# Patient Record
Sex: MALE | Race: BLACK OR AFRICAN AMERICAN | NOT HISPANIC OR LATINO | Employment: OTHER | URBAN - METROPOLITAN AREA
[De-identification: names, ages, dates, MRNs, and addresses within clinical notes are randomized per-mention and may not be internally consistent; named-entity substitution may affect disease eponyms.]

---

## 2017-08-04 ENCOUNTER — CARE COORDINATION (OUTPATIENT)
Dept: SURGERY | Facility: CLINIC | Age: 62
End: 2017-08-04

## 2017-08-04 NOTE — PROGRESS NOTES
Rec'd a msg from [facility] that pt's friend, Chanelle, had questions about our dept as pt has rectal cancer, has had surgery.  Called her see if I can answer any questions but had to lv  msg.

## 2017-08-04 NOTE — PROGRESS NOTES
Chanelle called back; states she is pt's former wife.  She gave me pt's phone #, called him but had to lv  msg with my direct phone #.  Let him know Chanelle gave me his number and if he would like more information about a 2nd opinion, he can call me back.

## 2019-05-15 ENCOUNTER — DOCUMENTATION ONLY (OUTPATIENT)
Dept: CARE COORDINATION | Facility: CLINIC | Age: 64
End: 2019-05-15

## 2019-05-23 ENCOUNTER — OFFICE VISIT (OUTPATIENT)
Dept: INTERNAL MEDICINE | Facility: CLINIC | Age: 64
End: 2019-05-23
Payer: COMMERCIAL

## 2019-05-23 ENCOUNTER — PRE VISIT (OUTPATIENT)
Dept: UROLOGY | Facility: CLINIC | Age: 64
End: 2019-05-23

## 2019-05-23 VITALS
OXYGEN SATURATION: 97 % | DIASTOLIC BLOOD PRESSURE: 79 MMHG | SYSTOLIC BLOOD PRESSURE: 153 MMHG | HEART RATE: 61 BPM | RESPIRATION RATE: 16 BRPM

## 2019-05-23 DIAGNOSIS — N52.9 ERECTILE DYSFUNCTION, UNSPECIFIED ERECTILE DYSFUNCTION TYPE: ICD-10-CM

## 2019-05-23 DIAGNOSIS — M53.3 SACROILIAC JOINT PAIN: ICD-10-CM

## 2019-05-23 DIAGNOSIS — Z13.220 SCREENING FOR HYPERLIPIDEMIA: ICD-10-CM

## 2019-05-23 DIAGNOSIS — M53.3 SACROILIAC JOINT PAIN: Primary | ICD-10-CM

## 2019-05-23 DIAGNOSIS — C19 COLORECTAL CANCER (H): ICD-10-CM

## 2019-05-23 LAB
ANION GAP SERPL CALCULATED.3IONS-SCNC: 7 MMOL/L (ref 3–14)
BUN SERPL-MCNC: 13 MG/DL (ref 7–30)
CALCIUM SERPL-MCNC: 9.5 MG/DL (ref 8.5–10.1)
CHLORIDE SERPL-SCNC: 104 MMOL/L (ref 94–109)
CHOLEST SERPL-MCNC: 256 MG/DL
CO2 SERPL-SCNC: 28 MMOL/L (ref 20–32)
CREAT SERPL-MCNC: 0.89 MG/DL (ref 0.66–1.25)
GFR SERPL CREATININE-BSD FRML MDRD: 89 ML/MIN/{1.73_M2}
GLUCOSE SERPL-MCNC: 94 MG/DL (ref 70–99)
HDLC SERPL-MCNC: 93 MG/DL
LDLC SERPL CALC-MCNC: 140 MG/DL
NONHDLC SERPL-MCNC: 163 MG/DL
POTASSIUM SERPL-SCNC: 4 MMOL/L (ref 3.4–5.3)
SODIUM SERPL-SCNC: 138 MMOL/L (ref 133–144)
TRIGL SERPL-MCNC: 117 MG/DL

## 2019-05-23 RX ORDER — OMEPRAZOLE 20 MG/1
20 TABLET, DELAYED RELEASE ORAL DAILY
Qty: 30 TABLET | Refills: 3 | Status: SHIPPED | OUTPATIENT
Start: 2019-05-23 | End: 2021-04-07

## 2019-05-23 RX ORDER — MELOXICAM 15 MG/1
15 TABLET ORAL DAILY
Qty: 30 TABLET | Refills: 3 | Status: SHIPPED | OUTPATIENT
Start: 2019-05-23 | End: 2019-05-23

## 2019-05-23 RX ORDER — IBUPROFEN 200 MG
TABLET ORAL
COMMUNITY
Start: 2017-08-22 | End: 2021-07-20

## 2019-05-23 RX ORDER — MELOXICAM 15 MG/1
15 TABLET ORAL DAILY
Qty: 30 TABLET | Refills: 3 | Status: SHIPPED | OUTPATIENT
Start: 2019-05-23 | End: 2021-04-07

## 2019-05-23 SDOH — HEALTH STABILITY: MENTAL HEALTH: HOW OFTEN DO YOU HAVE A DRINK CONTAINING ALCOHOL?: 2-4 TIMES A MONTH

## 2019-05-23 ASSESSMENT — PAIN SCALES - GENERAL: PAINLEVEL: MODERATE PAIN (5)

## 2019-05-23 NOTE — TELEPHONE ENCOUNTER
MEDICAL RECORDS REQUEST   [facility]  Urology Clinic  [address] [place] [address]  PHONE: [phone]  Fax: [phone]        FUTURE VISIT INFORMATION                                                   Paul Roland YOB: 1955 scheduled for future visit at [facility]    APPOINTMENT INFORMATION:    Date: 19 7:20AM     Provider:  Eliceo Damon MD     Reason for Visit/Diagnosis: ED    REFERRAL INFORMATION:    Referring provider: TOM BALL     Specialty: MD    Referring providers clinic:  [facility]     Clinic contact number: [phone]    RECORDS REQUESTED FOR VISIT                                                     NOTES  STATUS/DETAILS   OFFICE NOTE from referring provider  yes   OFFICE NOTE from other specialist  yes   DISCHARGE SUMMARY from hospital  no   DISCHARGE REPORT from the ER  no   OPERATIVE REPORT  no   MEDICATION LIST  no     PRE-VISIT CHECKLIST      Record collection complete Yes   Appointment appropriately scheduled           (right time/right provider) Yes   MyChart activation If no, please explain: In process   Questionnaire complete If no, please explain: In process     Completed by: Zhanna Lara

## 2019-05-23 NOTE — TELEPHONE ENCOUNTER
Reason for Visit: Consult    Diagnosis: ED    Orders/Procedures/Records: n/a    Contact Patient: n/a    Rooming Requirements: normal      Smiley Eaton LPN  05/23/19  2:20 PM

## 2019-05-23 NOTE — PROGRESS NOTES
PRIMARY CARE CENTER       Assessment and Plan   Paul Roland is a 64 year old male with past medical history of adenomatous polyp of colon, urinary retention, BPH, rectal cancer diagnosed on 8/10/2017 stage I, and tobacco abuse, all previously followed at [facility] -now presents to establish care.     Sacroiliac joint pain  Likely from sitting for prolonged periods of time at work (works as )   -     PHYSICAL THERAPY REFERRAL; Future  -     meloxicam (MOBIC) 15 MG tablet; Take 1 tablet (15 mg) by mouth daily  -     Basic metabolic panel; Future  -     omeprazole (PRILOSEC OTC) 20 MG EC tablet; Take 1 tablet (20 mg) by mouth daily    Erectile dysfunction, unspecified erectile dysfunction type  Previously receiving Intracavernosal injections. Has 10 injections left.   -     UROLOGY ADULT REFERRAL    Colorectal cancer (H)  Followed previously at [facility]. Would like to establish care here for follow up.   Colonoscopy due in 10/2019.   Has some issues straining however no thin stools or rectal bleeding. BM BID - generally formed.   -     COLORECTAL SURGERY REFERRAL   - Miralax to ensure he minimizes straining.     Health Maintenance    Blood pressure check - Mildly elevated. May ve is NSAID use however will  monitor.     Weight - patient is 0 lbs 0 oz, There is no height or weight on file to calculate BMI.   Depression/Anxiety -  Low PHQ2     Alcohol Use -  Occasional    Tobacco Use - Counseled on possible cessation however patient precontemplative.     Screening     Colon Cancer - Annual due in 10/2019    Lung Cancer - Due. Will address at next v isit.     Immunizations  - Up to date     Men's Health     Cholesterol -  Ordered lipid pane     AAA - Due currently. Will discuss at next visit.      Options for treatment and follow-up care were reviewed with the patient. Paul Roland engaged in the decision making process and verbalized understanding of the options discussed and  agreed with the final plan.    Patient should follow up in 1-2 months with provider in resident clinic   At this appointment he will need:   - Discuss Back Pain   - DIscuss HTN and recorded home BP   - Colonoscopy referral for 10/2019   - May choose to discuss Lung cancer screening and discuss AAA screening.     Violeta Meade MD  May 23, 2019    Pt was seen and examined by me; I agree with the A/P documentation above    Lena Turner MD        HPI:       Paul Roland is a 64 year old male with past medical history of adenomatous polyp of colon, urinary retention, BPH, rectal cancer diagnosed on 8/10/2017 stage I, and tobacco abuse, all previously followed at [facility] -now presents to establish care.    As of 2018, [facility] his erectile dysfunction as well as BPH.  At the time, he was receiving intracavernosal injections. Previously he received suboptimal response with respect to duration of erections with pharmacoptherapy.   He was also last seen in colorectal surgery clinic on 11/30/2017.  At the time, he had undergone proctectomy wth tiverting loop ileostomy for T2 N0 rectal cancer (August 16, 2017. His issues with erectile dysfunction and urinary retention began post procedure.     He does not have ap [facility] physician and hopes to transfer his care to [facility] for all his sub specialists as well.     Today he wishes to discuss:  1) Severe lower back   States it has been occurring for over a month and points to his low back area.  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  He works nights and drives from town p.m. to 6 AM in the morning.  Generally, when he returns home in the morning, he sleeps on  A heating pad which seems to help. Also takes 10 200 mg Ibuprofen per shift which seems to help; has not hag melena hematochezia or hematemesis or stomach pain. Usually states the pain is  bilateral and wraps around his buttock region from his low back. He is able to walk and drive without issue     2) Colorectal cancer   Diagnosed at [facility], underwent proctectomy and diverting loop ileostomy there (and had sequelae of ED as well as urinary retention thereafter for which  followed with urology). Undergoes colonoscopies every October. Last completed 10/24/2018.   Today, he states he does have to strain to use the restroom. He is regular and has 1-2 BMs per day but feels his stools are hard.      Past Medical History:   Diagnosis Date     Rectal cancer (H) 08/10/2017    Clinical: Stage I (T2, N0, M0)        Past Surgical History:   Procedure Laterality Date     RECTAL SURGERY  08/16/2017    with diverting loop ileostomy      Family History   Problem Relation Age of Onset     Liver Cancer Mother      Social History     Tobacco Use     Smoking status: Current Every Day Smoker     Smokeless tobacco: Never Used   Substance Use Topics     Alcohol use: Yes     Frequency: 2-4 times a month      Medications are reviewed in chart.        Review of Systems:   Review Of Systems - 10 point ROS negative apart from above.           Physical Exam:   /79   Pulse 61   Resp 16   SpO2 97%   There is no height or weight on file to calculate BMI.  Gen: In no acute distress. Patient comfortably sitting on exam table in clinic.   HEENT: No scleral icterus, Moist mucous membranes. No nasal discharge.  CV: Normal rate, regular rhythm. S1/S2 normal.  No murmurs, rubs or gallops   Resp: Clear to auscultation bilaterally. Without wheeze or crackles  Abdomen: Soft, non tender abdomen, normal active bowel sounds,   Extremities: No peripheral edema, warm, capillary refill < 2 seconds, Negative straight leg raise. Positive figure four.   Skin: without rash or trauma   Neuro: Oriented to city, date and year.       Results:   Reviewed in chart

## 2019-05-23 NOTE — NURSING NOTE
Chief Complaint   Patient presents with     Back Pain     pt is here to discuss low back pain x 1 month        Vital signs:      BP: 153/79 Pulse: 61   Resp: 16 SpO2: 97 %          There is no height or weight on file to calculate BMI.      Smiley Guzman CMA at 7:09 AM on 5/23/2019

## 2019-05-23 NOTE — PATIENT INSTRUCTIONS
1) For your back pain   I believe this is most likley SI joint pain. I recommend you obtain an SI joint pillow which will allow for you to optimally position your SI joint. I also recommend you take Melxocam ONCE A DAY at around 8pm prior to your shift. I would also take the omeprazole to ensure your stomach lining is protected once a day.   Physical therapy will help as well.     Attached below is some information on inflammation of the SI joint.     Patient Education     Sacroiliitis  The sacrum is the triangle-shaped bone at the base of the spine. It is linked to the other pelvis bones by the sacroiliac joints, also called SI joints. Sacroiliitis is when one or both SI joints are hurt or inflamed. It can make small movements of the lower back and pelvis very painful.  This condition has been linked to other diseases.Pain occurs most often in the morning or after sitting for long periods of time. The pain may get worse when you walk. The swinging motion of the hips strains the SI joints.  Sacroiliitis is caused by many factors such as:    Heavy lifting, especially if not done the right way    Severe injury, such as a fall or car accident    Osteoarthritis    Pregnancy  This condition is hard to diagnose. It may be confused with other causes of low back pain. To confirm the diagnosis, you may be given a shot of numbing medicine in the SI joint. Treatment includes rest, physical therapy, and anti-inflammatory medicines. If another health problem is the cause, then that must also be treated. More testing may be needed if your symptoms don t get better.  Home care    If your healthcare provider has prescribed medicines, take them as directed.    You may use over-the-counter pain medicine to control pain, unless another medicine was prescribed. If prednisone was prescribed, don t use NSAIDs, or nonsteroidal anti-inflammatory drugs, such as ibuprofen or naproxen. Talk with your provider before using this medicine if you  have chronic liver or kidney disease or ever had a stomach ulcer or gastrointestinal bleeding.    If you were referred to physical therapy, make an appointment. Be sure to do any prescribed exercises.    Don t smoke. Smoking reduces blood flow to the inflamed area. This makes it harder to treat. Talk with your doctor if you need help quitting.  Follow-up care  Follow up with your healthcare provider, or as advised.  If you had an X-ray or an MRI, you will be notified of any new findings that may affect your care.  When to seek medical advice  Contact your healthcare provider right away if any of these occur:    Increasing low back pain    Inflammation of the eyes    Skin rash or redness    Weakness or numbness in one or both legs    Loss of bowel or bladder control    Numbness in the groin area  Date Last Reviewed: 5/1/2018 2000-2018 [employer]. [address] [place] [address]. All rights reserved. This information is not intended as a substitute for professional medical care. Always follow your healthcare professional's instructions.             2) Straining when you have BM   You mentioned you strain at times to have a BM however have no urgency or incontinence. You can try MIRALAX over the counter to makes ure your bowel movements are soft.     3) I referred you to colorectal surgery as well as urology to follow up on your chronic health conditions     I'd liek to see you in 1 month to follow up on your health   Please also measure your blood pressure one - two times per week and write them down. I'd like to ensure you do not have high blood pressure as well.     Finally, please have your labs drawn today.             Primary Care Center Phone Number [phone]  Primary Care Center Medication Refill Request Information:  * Please contact your pharmacy regarding ANY request for medication refills.  ** [facility] Prescription Fax = [phone]  * Please allow 3 business days for routine  medication refills.  * Please allow 5 business days for controlled substance medication refills.     Primary Care Center Test Result notification information:  *You will be notified with in 7-10 days of your appointment day regarding the results of your test.  If you are on MyChart you will be notified as soon as the provider has reviewed the results and signed off on them.               [facility]         Internal Medicine Resident                   Continuity Clinic    Who We Are    Resident Continuity Clinic is a part of the [facility] Primary Care Clinic.  Resident physicians see patients independently and establish a relationship with them over the course of their three-year residency program.  As with the Primary Care Clinic, our Resident Continuity Clinic models a group practice.  If your doctor is not available, you will be able to see another resident physician.  At the end of a resident s training, patients will be transitioned to a new resident physician for ongoing care.     We treat patients with a wide array of medical needs from routine physicals, to acute illnesses, to diabetes and blood pressure management, to complex medical illness.  What is a Resident Physician?    Resident physicians hold medical degrees and are doctors. They are training to become specialists in Internal Medicine. They work under the supervision of board-certified faculty physicians.  Expectations for Your Care    We strive to provide accessible, quality care at all times.    In order to provide this care, it is best to see your primary care resident doctor consistently rather switching between providers.  In the event you do see another physician, you should schedule a follow-up visit with your usual primary care doctor.    If you are transitioning your care from another clinic, it is helpful to have your records available for your doctor to review.    We do not prescribe controlled substances, such as ADD  medications or narcotic pain medications, on your first visit.  We will review your health records and concerns prior to devising a treatment plan with you in order to provide the best care.      Clinic Services     Extended clinic hours; patient  to help navigate your visit;  parking; laboratory and imaging services with evening and weekend hours    Multiple medical and surgical specialties in one building    Complementary services, including Nutrition, Integrative Medicine, Pharmacy consultations, Mental and Behavioral Health, Sports Medicine and Physical Therapy    Thank You    We would like to thank you for choosing the [facility] Internal Medicine Resident Continuity Clinic for your primary care. You are making a priceless contribution to the training of the next generation of health care practitioners.     Contact us at [phone] for appointments or questions.    Resident Clinic Hours are Tuesdays and Thursdays, 7:30am-5:00pm    Residents   Hugo Sheth MD  (Male)   Kyra Zarate MD (Female)  Smiley Emmanuel MD   (Female)   Anushka Arrieta MD   (Female)   Jony Middleton MD  (Male)   Rizwan Ramos MD  (Male)   Miguel Bowden MD    (Female)   John Soni MD  (Male)   Tony Segura MD  (Male)    Jazmine Salinas MD  (Female)   Ba Duval MD  (Male)   Saúl Williamson MD  (Female)   Ronnell Rashid MD   (Female)   oTmmie Tamez MD  (Male)   Chente Hutchinson MD  (Male)   Rizwan Martinez MD (Male)   Be Fiore MD  (Male)   Jasmin Bone MD (Female)    Kia Almendarez MD (Female)   Rajeev Seo MD  (Male)   Violeta Meade MD    (Female)   Marianne Rueda MD  (Female)    Supervising Physicians   MD Maryam Fatima MD Briar Duffy, MD James Langland, MD Mary Logeais, MD Tanya Melnik, MD Charles Moldow, MD Heather Thompson Buum, MD Kathleen Watson, MD

## 2019-05-30 ENCOUNTER — OFFICE VISIT (OUTPATIENT)
Dept: UROLOGY | Facility: CLINIC | Age: 64
End: 2019-05-30
Attending: INTERNAL MEDICINE
Payer: COMMERCIAL

## 2019-05-30 VITALS
WEIGHT: 192 LBS | HEART RATE: 69 BPM | SYSTOLIC BLOOD PRESSURE: 129 MMHG | DIASTOLIC BLOOD PRESSURE: 87 MMHG | HEIGHT: 72 IN | BODY MASS INDEX: 26.01 KG/M2

## 2019-05-30 DIAGNOSIS — N52.9 ERECTILE DYSFUNCTION, UNSPECIFIED ERECTILE DYSFUNCTION TYPE: Primary | ICD-10-CM

## 2019-05-30 ASSESSMENT — ENCOUNTER SYMPTOMS
STIFFNESS: 0
ARTHRALGIAS: 0
MUSCLE CRAMPS: 1
MUSCLE WEAKNESS: 0
BACK PAIN: 1
NECK PAIN: 0
ARTHRALGIAS: 0
MYALGIAS: 1
NECK PAIN: 0
STIFFNESS: 0
MUSCLE CRAMPS: 1
BACK PAIN: 1
MYALGIAS: 1
JOINT SWELLING: 0
MUSCLE WEAKNESS: 0
JOINT SWELLING: 0

## 2019-05-30 ASSESSMENT — MIFFLIN-ST. JEOR: SCORE: 1688.91

## 2019-05-30 ASSESSMENT — PATIENT HEALTH QUESTIONNAIRE - PHQ9
SUM OF ALL RESPONSES TO PHQ QUESTIONS 1-9: 0
SUM OF ALL RESPONSES TO PHQ QUESTIONS 1-9: 0

## 2019-05-30 ASSESSMENT — PAIN SCALES - GENERAL: PAINLEVEL: NO PAIN (0)

## 2019-05-30 NOTE — LETTER
5/30/2019       RE: Paul Roland  [address]  [place] [address]     Dear Colleague,    Thank you for referring your patient, Paul Roland, to the [facility] UROLOGY AND INST FOR PROSTATE AND UROLOGIC CANCERS at [facility]. Please see a copy of my visit note below.    We are pleased to see Mr. Paul Roland in consultation at the request of Johnny Maki for the evaluation of chief complaint listed below    CC:    Erectile dysfunction         History of Present Illness:   Paul Roland is a(n) 64 year old MALE with history of proctectomy/ileostomy in August 2017 for rectal cancer, ileostomy takedown and TURP three months later. Now on Trimix for ED since his original rectal cancer surgery. He also complains of anejaculation and wants to discuss the etiology and any treatments.      He reports his LUTS is much improved since his TURP. He does note some improvement in spontaneous erections in recent months and is considering trying Viagra again. Trimix is working, he is using 0.5mL of the #1 formulation.    No personal or family history of prostate cancer.         Past Medical History:     Past Medical History:   Diagnosis Date     BPH (benign prostatic hyperplasia)      Rectal cancer (H) 08/10/2017    Clinical: Stage I (T2, N0, M0)             Past Surgical History:     Past Surgical History:   Procedure Laterality Date     RECTAL SURGERY  08/16/2017    with diverting loop ileostomy      TURP              Social History:   Not asked         Family History:     Family History   Problem Relation Age of Onset     Liver Cancer Mother      No urologic cancers in the family.         Allergies:   No Known Allergies         Medications:     Current Outpatient Medications   Medication Sig     ibuprofen (ADVIL/MOTRIN) 200 MG tablet      meloxicam (MOBIC) 15 MG tablet Take 1 tablet (15 mg) by mouth daily     omeprazole (PRILOSEC OTC) 20 MG EC tablet Take 1  tablet (20 mg) by mouth daily     No current facility-administered medications for this visit.             REVIEW OF SYSTEMS:   Answers for HPI/ROS submitted by the patient on 5/30/2019   PHQ9 TOTAL SCORE: 0  General Symptoms: No  Skin Symptoms: No  HENT Symptoms: No  EYE SYMPTOMS: No  HEART SYMPTOMS: No  LUNG SYMPTOMS: No  INTESTINAL SYMPTOMS: No  URINARY SYMPTOMS: No  REPRODUCTIVE SYMPTOMS: Yes  SKELETAL SYMPTOMS: Yes  BLOOD SYMPTOMS: No  NERVOUS SYSTEM SYMPTOMS: No  MENTAL HEALTH SYMPTOMS: No  Back pain: Yes  Muscle aches: Yes  Neck pain: No  Swollen joints: No  Joint pain: No  Bone pain: No  Muscle cramps: Yes  Muscle weakness: No  Joint stiffness: No  Bone fracture: No  Scrotal pain or swelling: No  Erectile dysfunction: Yes  Penile discharge: No  Genital ulcers: No  Reduced libido: No         PHYSICAL EXAM   /87   Pulse 69   Ht 1.829 m (6')   Wt 87.1 kg (192 lb)   BMI 26.04 kg/m     GENERAL: No acute distress. Well nourished.   HEENT:  Sclerae anicteric.  Conjunctivae pink.  Moist mucous membranes.  CARDIAC:  Regular rate and rhythm.  LUNGS:  Non-labored breathing  ABDOMEN: No abdominal obesity  :  Deferred  SKIN: No rashes.  Dry.     EXTREMITIES:  Warm, well perfused.  No lower extremity edema bilaterally.  NEURO: normal gait, no focal deficits.           LABS AND IMAGING:   None          ASSESSMENT:   Paul Rolnad is a 64 year old male who presents for evaluation of erectile dysfunction.    We discussed multiple possible causes for his anejaculation - both nerve damage from his rectal cancer surgery, and TURP as a possible cause. We discussed possible treatment with an alpha agonist.  A lengthy conversation was also held with the patient regarding treatment options for erectile dysfunction including: PDE5-inhibitors, MUSE, intracavernosal injection (ICI), the vacuum assist device (PJ), constriction bands and penile prostheses.  The potential benefits of each as well as their associated side  effects and potential risks were discussed.          PLAN:       Trimix Rx for FV Trimix #4.  This will be more potent for him. He's already doing intracavernosal injections so he knows how to use this medication.    Offered retrograde semen analysis to see if he has RE or ejaculatory duct obstruction from TURP.  He declines.    He will try over-the-counter sudafed in case he has RE.    Discussed IPP surgery option at some point.  At this time he's happy with intracavernosal injections.    He will call for Viagra refill if what he has at home works.    Rand Turner  Urology PGY-2    Patient was seen and examined with Dr. Damon    CC: Johnny Maki    I saw and examined the patient with the resident today.  I agree with the resident note and plan of care as above.     Eliceo Damon MD  Urology Staff     Again, thank you for allowing me to participate in the care of your patient.      Sincerely,    Eliceo Damon MD

## 2019-05-30 NOTE — NURSING NOTE
New-ED    Hx of colorectal surgery for cancer, and ED is a side effect from operation but no prostate cancer, and has been unable to obtain an slight non-functional erection on his own.  He is using Penile Ejections that are working well and give him functional erections that lead to orgasm but unable to ejaculate.       Chief Complaint   Patient presents with     Consult For     New ED       Blood pressure 129/87, pulse 69, height 1.829 m (6'), weight 87.1 kg (192 lb). Body mass index is 26.04 kg/m .    There is no problem list on file for this patient.      No Known Allergies    Current Outpatient Medications   Medication Sig Dispense Refill     ibuprofen (ADVIL/MOTRIN) 200 MG tablet        meloxicam (MOBIC) 15 MG tablet Take 1 tablet (15 mg) by mouth daily 30 tablet 3     omeprazole (PRILOSEC OTC) 20 MG EC tablet Take 1 tablet (20 mg) by mouth daily 30 tablet 3       Social History     Tobacco Use     Smoking status: Current Every Day Smoker     Smokeless tobacco: Never Used   Substance Use Topics     Alcohol use: Yes     Frequency: 2-4 times a month     Drug use: Never       Pato Sanchez, EMT  5/30/2019  7:23 AM

## 2019-05-30 NOTE — PROGRESS NOTES
We are pleased to see Mr. Paul Roland in consultation at the request of Johnny Maki for the evaluation of chief complaint listed below    CC:    Erectile dysfunction         History of Present Illness:   Paul Roland is a(n) 64 year old MALE with history of proctectomy/ileostomy in August 2017 for rectal cancer, ileostomy takedown and TURP three months later. Now on Trimix for ED since his original rectal cancer surgery. He also complains of anejaculation and wants to discuss the etiology and any treatments.      He reports his LUTS is much improved since his TURP. He does note some improvement in spontaneous erections in recent months and is considering trying Viagra again. Trimix is working, he is using 0.5mL of the #1 formulation.    No personal or family history of prostate cancer.         Past Medical History:     Past Medical History:   Diagnosis Date     BPH (benign prostatic hyperplasia)      Rectal cancer (H) 08/10/2017    Clinical: Stage I (T2, N0, M0)             Past Surgical History:     Past Surgical History:   Procedure Laterality Date     RECTAL SURGERY  08/16/2017    with diverting loop ileostomy      TURP              Social History:   Not asked         Family History:     Family History   Problem Relation Age of Onset     Liver Cancer Mother      No urologic cancers in the family.         Allergies:   No Known Allergies         Medications:     Current Outpatient Medications   Medication Sig     ibuprofen (ADVIL/MOTRIN) 200 MG tablet      meloxicam (MOBIC) 15 MG tablet Take 1 tablet (15 mg) by mouth daily     omeprazole (PRILOSEC OTC) 20 MG EC tablet Take 1 tablet (20 mg) by mouth daily     No current facility-administered medications for this visit.             REVIEW OF SYSTEMS:   Answers for HPI/ROS submitted by the patient on 5/30/2019   PHQ9 TOTAL SCORE: 0  General Symptoms: No  Skin Symptoms: No  HENT Symptoms: No  EYE SYMPTOMS: No  HEART SYMPTOMS: No  LUNG SYMPTOMS: No  INTESTINAL  SYMPTOMS: No  URINARY SYMPTOMS: No  REPRODUCTIVE SYMPTOMS: Yes  SKELETAL SYMPTOMS: Yes  BLOOD SYMPTOMS: No  NERVOUS SYSTEM SYMPTOMS: No  MENTAL HEALTH SYMPTOMS: No  Back pain: Yes  Muscle aches: Yes  Neck pain: No  Swollen joints: No  Joint pain: No  Bone pain: No  Muscle cramps: Yes  Muscle weakness: No  Joint stiffness: No  Bone fracture: No  Scrotal pain or swelling: No  Erectile dysfunction: Yes  Penile discharge: No  Genital ulcers: No  Reduced libido: No         PHYSICAL EXAM   /87   Pulse 69   Ht 1.829 m (6')   Wt 87.1 kg (192 lb)   BMI 26.04 kg/m    GENERAL: No acute distress. Well nourished.   HEENT:  Sclerae anicteric.  Conjunctivae pink.  Moist mucous membranes.  CARDIAC:  Regular rate and rhythm.  LUNGS:  Non-labored breathing  ABDOMEN: No abdominal obesity  :  Deferred  SKIN: No rashes.  Dry.     EXTREMITIES:  Warm, well perfused.  No lower extremity edema bilaterally.  NEURO: normal gait, no focal deficits.           LABS AND IMAGING:   None          ASSESSMENT:   Paul Roland is a 64 year old male who presents for evaluation of erectile dysfunction.    We discussed multiple possible causes for his anejaculation - both nerve damage from his rectal cancer surgery, and TURP as a possible cause. We discussed possible treatment with an alpha agonist.  A lengthy conversation was also held with the patient regarding treatment options for erectile dysfunction including: PDE5-inhibitors, MUSE, intracavernosal injection (ICI), the vacuum assist device (PJ), constriction bands and penile prostheses.  The potential benefits of each as well as their associated side effects and potential risks were discussed.          PLAN:       Trimix Rx for FV Trimix #4.  This will be more potent for him. He's already doing intracavernosal injections so he knows how to use this medication.    Offered retrograde semen analysis to see if he has RE or ejaculatory duct obstruction from TURP.  He declines.    He will  try over-the-counter sudafed in case he has RE.    Discussed IPP surgery option at some point.  At this time he's happy with intracavernosal injections.    He will call for Viagra refill if what he has at home works.    Rand Turner  Urology PGY-2    Patient was seen and examined with Dr. Damon    CC: Johnny Maki    I saw and examined the patient with the resident today.  I agree with the resident note and plan of care as above.     Eliceo Damon MD  Urology Staff

## 2019-05-31 ASSESSMENT — PATIENT HEALTH QUESTIONNAIRE - PHQ9: SUM OF ALL RESPONSES TO PHQ QUESTIONS 1-9: 0

## 2019-05-31 NOTE — TELEPHONE ENCOUNTER
FUTURE VISIT INFORMATION      FUTURE VISIT INFORMATION:    Date: 6/27/19    Time: 2:30 pm    [place] [facility] [place]  REFERRAL INFORMATION:    Referring provider:  Dr. Violeta Meade    Referring providers clinic:  [facility]    Reason for visit/diagnosis:  HX of Colon Cancer    NOTES STATUS DETAILS   OFFICE NOTE from referring provider  Internal 5/23/19   OFFICE NOTE from other specialist   Care Everywhere 10/24/18   DISCHARGE SUMMARY FROM HOSPITAL Care Everywhere 11/17/19, 8/16/17   DISCHARGE REPORT FROM ED Care Everywhere 7/10/17 (Nurse Triage)   OPERATIVE REPORT  Care Everywhere 11/17/17, 8/16/17   PFC REPORT N/A    MEDICATION LIST Care Everywhere    LABS     FIT/STOOL TESTING N/A    ANAL PAP N/A    PATHOLOGY REPORTS RELATED TO DIAGNOSIS Care Everywhere 10/24/18   DIAGNOSTIC PROCEDURES     COLON[facility]COPY [facility]/18- [facility]-Requested photos  7/20/17-Report in [facility]   ENDOSCOPY (EGD) N/A    ERCP N/A    ANOSCOPY N/A    FLEX SIGMOIDOSCOPY N/A    IMAGING & REPORT      CT, MRI, US, XR In process FL Colon w Gastrografin-10/24/17  Endoscopic US Lower-8/3/17     Action    Action Taken 5/31/2019 11:56 AM Faxed request for imaging and color photos to [employer] at [phone].  LILIANA     Action Josy LYNN 6.6.19 4:00 PM   Action Taken Pulled 10.24.17 Fl Colon image. LVM requesting color photos of colonoscopy on 10.24.18 and Endoscopic US lower 8.3.17.

## 2019-06-12 ENCOUNTER — PRE VISIT (OUTPATIENT)
Dept: SURGERY | Facility: CLINIC | Age: 64
End: 2019-06-12

## 2019-06-12 ENCOUNTER — OFFICE VISIT (OUTPATIENT)
Dept: SURGERY | Facility: CLINIC | Age: 64
End: 2019-06-12
Payer: COMMERCIAL

## 2019-06-12 ENCOUNTER — TELEPHONE (OUTPATIENT)
Dept: GASTROENTEROLOGY | Facility: CLINIC | Age: 64
End: 2019-06-12

## 2019-06-12 VITALS
OXYGEN SATURATION: 97 % | SYSTOLIC BLOOD PRESSURE: 155 MMHG | HEART RATE: 66 BPM | DIASTOLIC BLOOD PRESSURE: 95 MMHG | HEIGHT: 72 IN | WEIGHT: 198.1 LBS | TEMPERATURE: 98.8 F | BODY MASS INDEX: 26.83 KG/M2

## 2019-06-12 DIAGNOSIS — C18.7 CANCER OF SIGMOID COLON (H): Primary | ICD-10-CM

## 2019-06-12 ASSESSMENT — MIFFLIN-ST. JEOR: SCORE: 1716.58

## 2019-06-12 ASSESSMENT — PAIN SCALES - GENERAL: PAINLEVEL: NO PAIN (0)

## 2019-06-12 NOTE — NURSING NOTE
Patient refused to schedule CT scan today, patient would prefer to do this after his October colonoscopy.

## 2019-06-12 NOTE — NURSING NOTE
Chief Complaint   Patient presents with     RECHECK     Color colonoscopyimage and US       Vitals:    06/12/19 1427   BP: (!) 155/95   BP Location: Left arm   Patient Position: Sitting   Cuff Size: Adult Regular   Pulse: 66   Temp: 98.8  F (37.1  C)   TempSrc: Oral   SpO2: 97%   Weight: 198 lb 1.6 oz   Height: 6'       Body mass index is 26.87 kg/m .                          Beulah VIDAL MA

## 2019-06-12 NOTE — LETTER
2019       RE: Paul Roland  [address]  [place] [address]     Dear Colleague,    Thank you for referring your patient, Paul Roland, to the [facility] COLON AND RECTAL SURGERY at [facility]. Please see a copy of my visit note below.    Colon and Rectal Surgery Consult Clinic Note    Referring provider:  Referred Self, MD  No address on file       RE: Paul Roland  : 1955  TAMY: 2019      Paul Roland is a very pleasant 64 year old male who presents today to establish care with history of rectal cancer.     HPI:    Diagnosed with T2N0 mid rectal cancer in 2017 and underwent low anterior resection with diverting loop ileostomy with Dr. Kevin Behm and Dr. Charlie John 17 with takedown of ileostomy 19 with Dr. Kevin Behm    Colonoscopy 10/24/18 with 11 mm tubular adenoma at the splenic flexure    Interval History: Paul has had some difficulty with bowel movements.  If he does not eat a lot he often has to strain even to pass a small bowel movement and these can be skinny.  However, if he eats larger amounts stools are of normal caliber and he does not have to strain as much.  He denies any urgency or fecal incontinence.  He rarely has any clustering.  He is not on any bowel meds but has taken milk of magnesia for constipation in the past.  He initially had difficulty with urination after surgery and underwent a TURP and now voids normally.  He does have erectile dysfunction and sees urology for this.  He has not followed up with any oncology providers.  His last CEA was 6.7 in 2017.  Family history of mother with liver cancer.   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    PLEASE SEE NOTE BELOW FOR PHYSICAL EXAMINATION, REVIEW OF SYSTEMS, AND OTHER HISTORY.    Assessment/Plan: 64 year old male with T2N0 rectal cancer history status post low anterior resection in 2017 with Dr. Behm.  According to  the operative report, his anastomosis is approximately 1 cm above the dentate line.  I was unable to palpate this today but he did have quite a bit of stool in his rectum.  He has some difficulty with bowel movements but no typical low anterior resection syndrome symptoms such as clustering, urgency, and fecal incontinence.  Recommended trying a fiber supplement to bulk up his stools to see if this improves the symptoms.  Continue to follow with urology for erectile dysfunction.  Would like him to follow with an oncologist for ongoing surveillance.  We will check CEA and a CT CAP at this time.  He will be due for a colonoscopy in October of this year and I would like that done either by me or one of my partners.  Patient's questions were answered to his stated satisfaction and he is in agreement with this plan.    PLEASE SEE NOTE BELOW FOR PHYSICAL EXAMINATION, REVIEW OF SYSTEMS, AND OTHER HISTORY.    Total face to face time was 15 minutes, >50% counseling.    Zoey Ye MD  Colon and Rectal Surgery Staff  [facility]      -------------------------------------------------------------------------------------------------------------------          Medical history:  Past Medical History:   Diagnosis Date     BPH (benign prostatic hyperplasia)      Rectal cancer (H) 08/10/2017    Clinical: Stage I (T2, N0, M0)        Surgical history:  Past Surgical History:   Procedure Laterality Date     RECTAL SURGERY  08/16/2017    with diverting loop ileostomy      TURP         Problem list:  There are no active problems to display for this patient.      Medications:  Current Outpatient Medications   Medication Sig Dispense Refill     ibuprofen (ADVIL/MOTRIN) 200 MG tablet        meloxicam (MOBIC) 15 MG tablet Take 1 tablet (15 mg) by mouth daily 30 tablet 3     NEW MED FV Trimix #4:  Trimix intracavernosal injection, per mL:  PGE1: 40 mcg  Papaverine: 27.6mg  Phentolamine: 1 mg    Sig:  Inject 0.1mL intracavernosal as directed.    Max use one daily and up to 3x/week.  May titrate up in 0.1mL increments as needed.  Use lowest effective dose. 5 mL prn     omeprazole (PRILOSEC OTC) 20 MG EC tablet Take 1 tablet (20 mg) by mouth daily (Patient not taking: Reported on 6/12/2019) 30 tablet 3       Allergies:  No Known Allergies    Family history:  Family History   Problem Relation Age of Onset     Liver Cancer Mother        Social history:  Social History     Socioeconomic History     Marital status: Single     Spouse name: Not on file     Number of children: Not on file     Years of education: Not on file     Highest education level: Not on file   Occupational History     Not on file   Social Needs     Financial resource strain: Not on file     Food insecurity:     Worry: Not on file     Inability: Not on file     Transportation needs:     Medical: Not on file     Non-medical: Not on file   Tobacco Use     Smoking status: Current Every Day Smoker     Smokeless tobacco: Never Used   Substance and Sexual Activity     Alcohol use: Yes     Frequency: 2-4 times a month     Drug use: Never     Sexual activity: Yes   Lifestyle     Physical activity:     Days per week: Not on file     Minutes per session: Not on file     Stress: Not on file   Relationships     Social connections:     Talks on phone: Not on file     Gets together: Not on file     Attends Hinduism service: Not on file     Active member of club or organization: Not on file     Attends meetings of clubs or organizations: Not on file     Relationship status: Not on file     Intimate partner violence:     Fear of current or ex partner: Not on file     Emotionally abused: Not on file     Physically abused: Not on file     Forced sexual activity: Not on file   Other Topics Concern     Parent/sibling w/ CABG, MI or angioplasty before 65F 55M? Not Asked   Social History Narrative     Not on file         Nursing Notes:   Beulah Olvera   6/12/2019  2:37 PM  Signed  Chief Complaint   Patient presents with     RECHECK     Color colonoscopyimage and US       Vitals:    06/12/19 1427   BP: (!) 155/95   BP Location: Left arm   Patient Position: Sitting   Cuff Size: Adult Regular   Pulse: 66   Temp: 98.8  F (37.1  C)   TempSrc: Oral   SpO2: 97%   Weight: 198 lb 1.6 oz   Height: 6'       Body mass index is 26.87 kg/m .    JOHN PAUL Rendon Hannah, RN  6/12/2019  3:23 PM  Signed  Patient refused to schedule CT scan today, patient would prefer to do this after his October colonoscopy.      Physical Examination:  BP (!) 155/95 (BP Location: Left arm, Patient Position: Sitting, Cuff Size: Adult Regular)   Pulse 66   Temp 98.8  F (37.1  C) (Oral)   Ht 6'   Wt 198 lb 1.6 oz   SpO2 97%   BMI 26.87 kg/m     General: alert, oriented, in no acute distress, sitting comfortably  HEENT: mucous membranes moist  Abdomen: Soft, non distended, non tender on palpation; well healed incisions without evidence of hernia  Perianal external examination:  Perianal skin: Intact with no excoriation or lichenification.    Digital rectal examination: Was performed.   Sphincter tone: Good.  Palpable lesions: No.  Prostate: Not assessed.  Other: Unable to palpate anastomosis but moderate amount of stool in rectum.    Anoscopy: Was deferred.    Again, thank you for allowing me to participate in the care of your patient.      Sincerely,    Zoey Ye MD

## 2019-06-12 NOTE — PROGRESS NOTES
Colon and Rectal Surgery Consult Clinic Note    Referring provider:  Referred Self, MD  No address on file       RE: Paul Roland  : 1955  TAMY: 2019      Paul Roland is a very pleasant 64 year old male who presents today to establish care with history of rectal cancer.     HPI:    Diagnosed with T2N0 mid rectal cancer in 2017 and underwent low anterior resection with diverting loop ileostomy with Dr. Kevin Behm and Dr. Charlie John 17 with takedown of ileostomy 19 with Dr. Kevin Behm    Colonoscopy 10/24/18 with 11 mm tubular adenoma at the splenic flexure    Interval History: Paul has had some difficulty with bowel movements.  If he does not eat a lot he often has to strain even to pass a small bowel movement and these can be skinny.  However, if he eats larger amounts stools are of normal caliber and he does not have to strain as much.  He denies any urgency or fecal incontinence.  He rarely has any clustering.  He is not on any bowel meds but has taken milk of magnesia for constipation in the past.  He initially had difficulty with urination after surgery and underwent a TURP and now voids normally.  He does have erectile dysfunction and sees urology for this.  He has not followed up with any oncology providers.  His last CEA was 6.7 in 2017.  Family history of mother with liver cancer.   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    PLEASE SEE NOTE BELOW FOR PHYSICAL EXAMINATION, REVIEW OF SYSTEMS, AND OTHER HISTORY.    Assessment/Plan: 64 year old male with T2N0 rectal cancer history status post low anterior resection in 2017 with Dr. Behm.  According to the operative report, his anastomosis is approximately 1 cm above the dentate line.  I was unable to palpate this today but he did have quite a bit of stool in his rectum.  He has some difficulty with bowel movements but no typical low anterior resection syndrome symptoms such as clustering, urgency, and  fecal incontinence.  Recommended trying a fiber supplement to bulk up his stools to see if this improves the symptoms.  Continue to follow with urology for erectile dysfunction.  Would like him to follow with an oncologist for ongoing surveillance.  We will check CEA and a CT CAP at this time.  He will be due for a colonoscopy in October of this year and I would like that done either by me or one of my partners.  Patient's questions were answered to his stated satisfaction and he is in agreement with this plan.    PLEASE SEE NOTE BELOW FOR PHYSICAL EXAMINATION, REVIEW OF SYSTEMS, AND OTHER HISTORY.    Total face to face time was 15 minutes, >50% counseling.    Zoey Ye MD  Colon and Rectal Surgery Staff  [facility]      -------------------------------------------------------------------------------------------------------------------          Medical history:  Past Medical History:   Diagnosis Date     BPH (benign prostatic hyperplasia)      Rectal cancer (H) 08/10/2017    Clinical: Stage I (T2, N0, M0)        Surgical history:  Past Surgical History:   Procedure Laterality Date     RECTAL SURGERY  08/16/2017    with diverting loop ileostomy      TURP         Problem list:  There are no active problems to display for this patient.      Medications:  Current Outpatient Medications   Medication Sig Dispense Refill     ibuprofen (ADVIL/MOTRIN) 200 MG tablet        meloxicam (MOBIC) 15 MG tablet Take 1 tablet (15 mg) by mouth daily 30 tablet 3     NEW MED FV Trimix #4:  Trimix intracavernosal injection, per mL:  PGE1: 40 mcg  Papaverine: 27.6mg  Phentolamine: 1 mg    Sig: Inject 0.1mL intracavernosal as directed.    Max use one daily and up to 3x/week.  May titrate up in 0.1mL increments as needed.  Use lowest effective dose. 5 mL prn     omeprazole (PRILOSEC OTC) 20 MG EC tablet Take 1 tablet (20 mg) by mouth daily (Patient not taking: Reported on 6/12/2019) 30 tablet 3        Allergies:  No Known Allergies    Family history:  Family History   Problem Relation Age of Onset     Liver Cancer Mother        Social history:  Social History     Socioeconomic History     Marital status: Single     Spouse name: Not on file     Number of children: Not on file     Years of education: Not on file     Highest education level: Not on file   Occupational History     Not on file   Social Needs     Financial resource strain: Not on file     Food insecurity:     Worry: Not on file     Inability: Not on file     Transportation needs:     Medical: Not on file     Non-medical: Not on file   Tobacco Use     Smoking status: Current Every Day Smoker     Smokeless tobacco: Never Used   Substance and Sexual Activity     Alcohol use: Yes     Frequency: 2-4 times a month     Drug use: Never     Sexual activity: Yes   Lifestyle     Physical activity:     Days per week: Not on file     Minutes per session: Not on file     Stress: Not on file   Relationships     Social connections:     Talks on phone: Not on file     Gets together: Not on file     Attends Restorationism service: Not on file     Active member of club or organization: Not on file     Attends meetings of clubs or organizations: Not on file     Relationship status: Not on file     Intimate partner violence:     Fear of current or ex partner: Not on file     Emotionally abused: Not on file     Physically abused: Not on file     Forced sexual activity: Not on file   Other Topics Concern     Parent/sibling w/ CABG, MI or angioplasty before 65F 55M? Not Asked   Social History Narrative     Not on file         Nursing Notes:   Beulah Olvera  6/12/2019  2:37 PM  Signed  Chief Complaint   Patient presents with     RECHECK     Color colonoscopyimage and US       Vitals:    06/12/19 1427   BP: (!) 155/95   BP Location: Left arm   Patient Position: Sitting   Cuff Size: Adult Regular   Pulse: 66   Temp: 98.8  F (37.1  C)   TempSrc: Oral   SpO2: 97%    Weight: 198 lb 1.6 oz   Height: 6'       Body mass index is 26.87 kg/m .                          JOHN PAUL Rendon Hannah, RN  6/12/2019  3:23 PM  Signed  Patient refused to schedule CT scan today, patient would prefer to do this after his October colonoscopy.      Physical Examination:  BP (!) 155/95 (BP Location: Left arm, Patient Position: Sitting, Cuff Size: Adult Regular)   Pulse 66   Temp 98.8  F (37.1  C) (Oral)   Ht 6'   Wt 198 lb 1.6 oz   SpO2 97%   BMI 26.87 kg/m    General: alert, oriented, in no acute distress, sitting comfortably  HEENT: mucous membranes moist  Abdomen: Soft, non distended, non tender on palpation; well healed incisions without evidence of hernia  Perianal external examination:  Perianal skin: Intact with no excoriation or lichenification.    Digital rectal examination: Was performed.   Sphincter tone: Good.  Palpable lesions: No.  Prostate: Not assessed.  Other: Unable to palpate anastomosis but moderate amount of stool in rectum.    Anoscopy: Was deferred.

## 2019-06-13 ENCOUNTER — TELEPHONE (OUTPATIENT)
Dept: GASTROENTEROLOGY | Facility: CLINIC | Age: 64
End: 2019-06-13

## 2019-06-13 ENCOUNTER — TELEPHONE (OUTPATIENT)
Dept: ONCOLOGY | Facility: CLINIC | Age: 64
End: 2019-06-13

## 2019-06-13 NOTE — TELEPHONE ENCOUNTER
Talked with patient, who declined services at this time, stated he didn't need an oncologist.    I let Paul know he could call us back if he changes his mind, and provided our call back number.

## 2019-06-14 ENCOUNTER — TELEPHONE (OUTPATIENT)
Dept: GASTROENTEROLOGY | Facility: CLINIC | Age: 64
End: 2019-06-14

## 2019-06-20 ENCOUNTER — ANCILLARY PROCEDURE (OUTPATIENT)
Dept: CT IMAGING | Facility: CLINIC | Age: 64
End: 2019-06-20
Attending: COLON & RECTAL SURGERY
Payer: COMMERCIAL

## 2019-06-20 DIAGNOSIS — C18.7 CANCER OF SIGMOID COLON (H): ICD-10-CM

## 2019-06-20 LAB — CEA SERPL-MCNC: 1.3 UG/L (ref 0–2.5)

## 2019-06-20 RX ORDER — IOPAMIDOL 755 MG/ML
122 INJECTION, SOLUTION INTRAVASCULAR ONCE
Status: COMPLETED | OUTPATIENT
Start: 2019-06-20 | End: 2019-06-20

## 2019-06-20 RX ADMIN — IOPAMIDOL 122 ML: 755 INJECTION, SOLUTION INTRAVASCULAR at 15:50

## 2019-06-20 NOTE — DISCHARGE INSTRUCTIONS

## 2019-06-27 ENCOUNTER — OFFICE VISIT (OUTPATIENT)
Dept: INTERNAL MEDICINE | Facility: CLINIC | Age: 64
End: 2019-06-27
Payer: COMMERCIAL

## 2019-06-27 VITALS
DIASTOLIC BLOOD PRESSURE: 84 MMHG | HEART RATE: 67 BPM | BODY MASS INDEX: 26.31 KG/M2 | WEIGHT: 194 LBS | OXYGEN SATURATION: 98 % | SYSTOLIC BLOOD PRESSURE: 131 MMHG

## 2019-06-27 DIAGNOSIS — Z87.891 FORMER SMOKER: Primary | ICD-10-CM

## 2019-06-27 DIAGNOSIS — Z85.038 HX OF COLON CANCER, STAGE II: ICD-10-CM

## 2019-06-27 DIAGNOSIS — Z08 ENCOUNTER FOR FOLLOW-UP SURVEILLANCE OF COLON CANCER: ICD-10-CM

## 2019-06-27 DIAGNOSIS — Z85.038 ENCOUNTER FOR FOLLOW-UP SURVEILLANCE OF COLON CANCER: ICD-10-CM

## 2019-06-27 NOTE — NURSING NOTE
Chief Complaint   Patient presents with     Follow Up     Pt is here for 1mo f/u       Vital signs:      BP: 131/84(2nd attempt) Pulse: 67     SpO2: 98 %       Weight: 88 kg (194 lb)  Estimated body mass index is 26.31 kg/m  as calculated from the following:    Height as of 6/12/19: 1.829 m (6').    Weight as of this encounter: 88 kg (194 lb).      SMA Rogerio at 2:44 PM on 6/27/2019

## 2019-06-27 NOTE — PATIENT INSTRUCTIONS
[facility]         Internal Medicine Resident                   Continuity Clinic    Who We Are    Resident Continuity Clinic is a part of the [facility] Primary Care Clinic.  Resident physicians see patients independently and establish a relationship with them over the course of their three-year residency program.  As with the Primary Care Clinic, our Resident Continuity Clinic models a group practice.  If your doctor is not available, you will be able to see another resident physician.  At the end of a resident s training, patients will be transitioned to a new resident physician for ongoing care.     We treat patients with a wide array of medical needs from routine physicals, to acute illnesses, to diabetes and blood pressure management, to complex medical illness.  What is a Resident Physician?    Resident physicians hold medical degrees and are doctors. They are training to become specialists in Internal Medicine. They work under the supervision of board-certified faculty physicians.  Expectations for Your Care    We strive to provide accessible, quality care at all times.    In order to provide this care, it is best to see your primary care resident doctor consistently rather switching between providers.  In the event you do see another physician, you should schedule a follow-up visit with your usual primary care doctor.    If you are transitioning your care from another clinic, it is helpful to have your records available for your doctor to review.    We do not prescribe controlled substances, such as ADD medications or narcotic pain medications, on your first visit.  We will review your health records and concerns prior to devising a treatment plan with you in order to provide the best care.      Clinic Services     Extended clinic hours; patient  to help navigate your visit;  parking; laboratory and imaging services with evening and weekend hours    Multiple medical and  surgical specialties in one building    Complementary services, including Nutrition, Integrative Medicine, Pharmacy consultations, Mental and Behavioral Health, Sports Medicine and Physical Therapy    Thank You    We would like to thank you for choosing the [facility] Internal Medicine Resident Continuity Clinic for your primary care. You are making a priceless contribution to the training of the next generation of health care practitioners.     Contact us at [phone] for appointments or questions.    Resident Clinic Hours are Tuesdays and Thursdays, 7:30am-5:00pm    Residents   Hugo Sheth MD   (Male)   Kyra Zarate MD  (Female)  Smiley Emmanuel MD   (Female)   Anushka Arrieta MD   (Female)   Jony Middleton MD  (Male)   Rizwan Ramos MD  (Male)   Miguel Bowden MD    (Female)   John Soni MD  (Male)   Tony Segura MD  (Male)    Jazmine Salinas MD  (Female)   Ba Duval MD  (Male)   Saúl Williamson MD  (Female)   Ronnell Rashid MD    (Female)   Tommie Tamez MD  (Male)   Chente Hutchinson MD  (Male)   Rizwan Martinez MD (Male)   Be Fiore MD  (Male)   Jasmin Bone MD (Female)    Kia Almendarez MD (Female)   Rajeev Seo MD  (Male)   Violeta Meade MD    (Female)   Marianne Rueda MD  (Female)    Supervising Physicians   MD Maryam Fatima MD Briar Duffy, MD James Langland, MD Mary Logeais, MD Tanya Melnik, MD Charles Moldow, MD Heather Thompson Buum, MD Kathleen Watson, MD          Call to schedule CT and abdominal Ultrasound Radiology:  [phone] Formerly [facility] [place]

## 2019-06-28 NOTE — PROGRESS NOTES
PRIMARY CARE CENTER       SUBJECTIVE:  Paul Roland is a 64 year old male with a PMHx of T2N0M0 colorectal cancer s/p resection in 08/2017 comes in to discuss CT scan results and health care maintenance.     He continues to have sacroliliac joint pain worse during work days. He is a . On previous visit he was started on meloxicam, but he says this is not addressing his pain adequately. He stopped taking this medication and resumed aleve once a day. He states that in combination with tylenol he is achieving relief.     Otherwise denies additional symptoms.   Medications and allergies reviewed by me today.     ROS:   Constitutional, neuro, ENT, endocrine, pulmonary, cardiac, gastrointestinal, genitourinary, musculoskeletal, integument and psychiatric systems are negative, except as otherwise noted.    OBJECTIVE:    /84   Pulse 67   Wt 88 kg (194 lb)   SpO2 98%   BMI 26.31 kg/m     Wt Readings from Last 1 Encounters:   06/27/19 88 kg (194 lb)       GENERAL APPEARANCE: healthy, alert and no distress     EYES: EOMI,  PERRL     HENT: ear canals and TM's normal and nose and mouth without ulcers or lesions     NECK: no adenopathy, no asymmetry, masses, or scars and thyroid normal to palpation     RESP: lungs clear to auscultation - no rales, rhonchi or wheezes     CV: regular rates and rhythm, normal S1 S2, no S3 or S4 and no murmur, click or rub     ABDOMEN:  soft, nontender, no HSM or masses and bowel sounds normal     MS: extremities normal- no gross deformities noted, no evidence of inflammation in joints, FROM in all extremities.     SKIN: no suspicious lesions or rashes     NEURO: Normal strength and tone, sensory exam grossly normal, mentation intact and speech normal     PSYCH: mentation appears normal. and affect normal/bright     LYMPHATICS: No cervical adenopathy     ASSESSMENT/PLAN:    Paul was seen today for follow up.    Diagnoses and all orders for this  visit:    Sacroiliac joint pain  Discussed that its ok to do aleve as long as he is only taking once per day on work days. He is doing heat pads at home. Encouraged to start PPI which was ordered on his last visit. Instructed to not up-titrate his naproxen and to come in if once daily not providing enough relief.     Former smoker  -     US Abdominal Aorta Imaging; Future  -     CT Chest/Abdomen/Pelvis w contrast; Future    Encounter for follow-up surveillance of colon cancer  -     CT Chest/Abdomen/Pelvis w contrast; Future    Hx of colon cancer, stage II  -     CT Chest/Abdomen/Pelvis w contrast; Future         Pt should return to clinic for f/u with PCP in 3 month     Jony Middleton MD  Jun 27, 2019    Pt was discussed with Dr. Somers.     While the patient was in clinic, I reviewed the pertinent medical history and results.  I discussed the current findings on physical examination, as well as the patient s diagnosis and treatment plan with the resident and agree with the information as documented with the following exceptions: none.  Juancarlos Somers MD

## 2019-07-11 ENCOUNTER — TELEPHONE (OUTPATIENT)
Dept: ONCOLOGY | Facility: CLINIC | Age: 64
End: 2019-07-11

## 2019-07-11 NOTE — TELEPHONE ENCOUNTER
Tobacco Treatment Program at the [facility] attempted to reach Mr. Roland on 7/11/2019 regarding the tobacco cessation program to help Mr. Roland to quit smoking. We will attempt to reach Mr. Roland another time.

## 2019-07-18 NOTE — TELEPHONE ENCOUNTER
Tobacco Treatment Program at the [facility] attempted to reach Mr. Roland on 7/18/2019 regarding the tobacco cessation program to help Mr. Roland to quit smoking. We will attempt to reach Mr. Roland another time.

## 2019-07-29 NOTE — TELEPHONE ENCOUNTER
Tobacco Treatment Program at the [facility] attempted to reach Mr. Roland on 7/29/2019 regarding the tobacco cessation program to help Mr. Roland to quit smoking. We will attempt to reach Mr. Roland another time.

## 2019-08-12 ENCOUNTER — TELEPHONE (OUTPATIENT)
Dept: UROLOGY | Facility: CLINIC | Age: 64
End: 2019-08-12

## 2019-08-12 NOTE — TELEPHONE ENCOUNTER
[facility] Call Center    Phone Message    May a detailed message be left on voicemail: yes    Reason for Call: Other: Pt has general questions regarding Trimix #4:  Trimix intracavernosal injection, per mL     Action Taken: Message routed to:  [facility] urology

## 2019-08-12 NOTE — TELEPHONE ENCOUNTER
Called compound pharmacy and ordered trimix #4  Patient called and told Elma Coker LPN Staff Nurse

## 2019-08-22 ENCOUNTER — TELEPHONE (OUTPATIENT)
Dept: INTERNAL MEDICINE | Facility: CLINIC | Age: 64
End: 2019-08-22

## 2019-08-22 NOTE — TELEPHONE ENCOUNTER
----- Message from Markus Nieves RN sent at 8/22/2019  1:32 PM CDT -----  Please call the pt for f/u scheduling with one of the Thursday residents (Domenic Tejeda Hope). Given his history of rectal cancer and new findings on CT, we want to make sure he's taken care of. Thank you.    Markus

## 2019-09-06 NOTE — TELEPHONE ENCOUNTER
Left another detailed message to the pt to encourage to make an appt with PCP in our clinic or call me back with questions.

## 2019-09-18 ENCOUNTER — TELEPHONE (OUTPATIENT)
Dept: SURGERY | Facility: CLINIC | Age: 64
End: 2019-09-18

## 2019-09-18 ENCOUNTER — OFFICE VISIT (OUTPATIENT)
Dept: ORTHOPEDICS | Facility: CLINIC | Age: 64
End: 2019-09-18
Payer: COMMERCIAL

## 2019-09-18 ENCOUNTER — TELEPHONE (OUTPATIENT)
Dept: GASTROENTEROLOGY | Facility: CLINIC | Age: 64
End: 2019-09-18

## 2019-09-18 VITALS
BODY MASS INDEX: 26.31 KG/M2 | DIASTOLIC BLOOD PRESSURE: 104 MMHG | HEART RATE: 71 BPM | HEIGHT: 72 IN | SYSTOLIC BLOOD PRESSURE: 158 MMHG

## 2019-09-18 DIAGNOSIS — M54.12 CERVICAL RADICULAR PAIN: Primary | ICD-10-CM

## 2019-09-18 DIAGNOSIS — Z12.11 SPECIAL SCREENING FOR MALIGNANT NEOPLASMS, COLON: Primary | ICD-10-CM

## 2019-09-18 RX ORDER — METHYLPREDNISOLONE 4 MG
TABLET, DOSE PACK ORAL
Qty: 21 TABLET | Refills: 0 | Status: SHIPPED | OUTPATIENT
Start: 2019-09-18 | End: 2021-04-07

## 2019-09-18 RX ORDER — DICLOFENAC SODIUM 75 MG/1
75 TABLET, DELAYED RELEASE ORAL 2 TIMES DAILY
Qty: 40 TABLET | Refills: 1 | Status: SHIPPED | OUTPATIENT
Start: 2019-09-18 | End: 2019-11-14

## 2019-09-18 NOTE — LETTER
"     RE: Paul Roland  [address]  [place] [address]     Dear Colleague,    Thank you for referring your patient, Paul Roland, to the [facility] SPORTS AND ORTHOPAEDIC WALK IN CLINIC at [facility]. Please see a copy of my visit note below.          SPORTS & ORTHOPEDIC WALK-IN VISIT 9/18/2019    Primary Care Physician: Dr. Talley    Works at [employer], drives a shuttle and lifts luggage. Pain in neck and shoulder and down arm. Throbbing up right side. Has had to take time off of work the last two day. Took advil and tylenol extra strength last night which helped, days off have helped too. Pain mostly in posterior shoulder and is generalized. Stabbing pain. Icing the back of his neck helped. Lifting things over time makes it worse.     Reason for visit:     What part of your body is injured / painful?  right shoulder    What caused the injury /pain? Overuse injury from regular activity    How long ago did your injury occur or pain begin? About 2-3 weeks     What are your most bothersome symptoms? Pain, Numbness, Tingling and Other: \"quiver\"    How would you characterize your symptom?  stabbing    What makes your symptoms better? Tylenol and Other: advil - has tried ice and heating pad as well    What makes your symptoms worse? Other: lifting    Have you been previously seen for this problem? No    Medical History:    Any recent changes to your medical history? No    Any new medication prescribed since last visit? No    Have you had surgery on this body part before? No    Social History:    Occupation: drives shuttle at [employer]    Handedness: Right    Exercise:     Review of Systems:    Do you have fever, chills, weight loss? No    Do you have any vision problems? No    Do you have any chest pain or edema? No    Do you have any shortness of breath or wheezing?  No    Do you have stomach problems? No    Do you have any numbness or focal weakness? " No    Do you have diabetes? No    Do you have problems with bleeding or clotting? No    Do you have an rashes or other skin lesions? No         HISTORY OF PRESENT ILLNESS  Mr. Roland is a pleasant 64 year old year old male who presents to clinic today with neck pain  Paul explains that he has had on/off pain in neck and right shoulder  Location: right neck  Quality:  achy pain    Severity: 5/10 at worst    Duration: months  Timing: occurs intermittently    Modifying factors:  resting and non-use makes it better, movement and use makes it worse  Associated signs & symptoms: right arm radicular pain  Works as  and airport baggage  Additional history: as documented    MEDICAL HISTORY  There is no problem list on file for this patient.      Current Outpatient Medications   Medication Sig Dispense Refill     ibuprofen (ADVIL/MOTRIN) 200 MG tablet        meloxicam (MOBIC) 15 MG tablet Take 1 tablet (15 mg) by mouth daily 30 tablet 3     NEW MED FV Trimix #4:  Trimix intracavernosal injection, per mL:  PGE1: 40 mcg  Papaverine: 27.6mg  Phentolamine: 1 mg    Sig: Inject 0.1mL intracavernosal as directed.    Max use one daily and up to 3x/week.  May titrate up in 0.1mL increments as needed.  Use lowest effective dose. 5 mL prn     omeprazole (PRILOSEC OTC) 20 MG EC tablet Take 1 tablet (20 mg) by mouth daily (Patient not taking: Reported on 6/12/2019) 30 tablet 3       No Known Allergies    Family History   Problem Relation Age of Onset     Liver Cancer Mother        Additional medical/Social/Surgical histories reviewed in [facility] and updated as appropriate.     REVIEW OF SYSTEMS (9/18/2019)  10 point ROS of systems including Constitutional, Eyes, Respiratory, Cardiovascular, Gastroenterology, Genitourinary, Integumentary, Musculoskeletal, Psychiatric were all negative except for pertinent positives noted in my HPI.     PHYSICAL EXAM  Vitals:    09/18/19 1037   BP: (!) 158/104   Pulse: 71   Height: 1.829 m (6')      Vital Signs: BP (!) 158/104   Pulse 71   Ht 1.829 m (6')   BMI 26.31 kg/m    Patient declined being weighed. Body mass index is 26.31 kg/m .    Vital Signs: BP (!) 158/104   Pulse 71   Ht 1.829 m (6')   BMI 26.31 kg/m    Patient declined being weighed. Body mass index is 26.31 kg/m .    General  - normal appearance, in no obvious distress  CV  - normal radial pulse  Pulm  - normal respiratory pattern, non-labored  Musculoskeletal -neck and right shoulder  - inspection: normal bone and joint alignment, no obvious deformity, no scapular winging, no AC step-off  - palpation: mildly tender trap, normal clavicle, non-tender AC  - ROM:  Has some pain with flexion and extension of neck, radiates into right arm  - strength: 5/5  strength, 5/5 in all shoulder planes  - special tests:  Slightly positive spurlings on right  Neuro  - no sensory or motor deficit, grossly normal coordination, normal muscle tone  Skin  - no ecchymosis, erythema, warmth, or induration, no obvious rash  Psych  - interactive, appropriate, normal mood and affect  ASSESSMENT & PLAN  65 yo male with cervical radicular pain  Reviewed HEP  Given medications see orders  F/u in a few weeks if not improving and will consider MRI    Sterling Escalona MD, CAM

## 2019-09-18 NOTE — TELEPHONE ENCOUNTER
[facility] Call Center    Phone Message    May a detailed message be left on voicemail: yes    Reason for Call: Order(s): Other:   Reason for requested: Needing orders for a colonoscopy  Date needed:asap   Provider name: Dr. Ye    **Patient is also wondering if he needs to be seen by her prior to his colonoscopy? Please call back once the order is placed, and to let him know if he needs to be seen or not.      Action Taken: Message routed to:  [facility]): Colon and Rectal

## 2019-09-18 NOTE — LETTER
September 18, 2019      Paul Roland  [address]  [place] [address]              To whom it may concern:   Paul is under my care for a medical condition. He should limit any lifting to no more than 50 pounds.  Please contact me with any questions or concerns.      Sincerely,      Sterling Escalona MD

## 2019-09-18 NOTE — PROGRESS NOTES
"      SPORTS & ORTHOPEDIC WALK-IN VISIT 9/18/2019    Primary Care Physician: Dr. Talley    Works at [employer], drives a shuttle and lifts luggage. Pain in neck and shoulder and down arm. Throbbing up right side. Has had to take time off of work the last two day. Took advil and tylenol extra strength last night which helped, days off have helped too. Pain mostly in posterior shoulder and is generalized. Stabbing pain. Icing the back of his neck helped. Lifting things over time makes it worse.     Reason for visit:     What part of your body is injured / painful?  right shoulder    What caused the injury /pain? Overuse injury from regular activity    How long ago did your injury occur or pain begin? About 2-3 weeks     What are your most bothersome symptoms? Pain, Numbness, Tingling and Other: \"quiver\"    How would you characterize your symptom?  stabbing    What makes your symptoms better? Tylenol and Other: advil - has tried ice and heating pad as well    What makes your symptoms worse? Other: lifting    Have you been previously seen for this problem? No    Medical History:    Any recent changes to your medical history? No    Any new medication prescribed since last visit? No    Have you had surgery on this body part before? No    Social History:    Occupation: drives shuttle at [employer]    Handedness: Right    Exercise:     Review of Systems:    Do you have fever, chills, weight loss? No    Do you have any vision problems? No    Do you have any chest pain or edema? No    Do you have any shortness of breath or wheezing?  No    Do you have stomach problems? No    Do you have any numbness or focal weakness? No    Do you have diabetes? No    Do you have problems with bleeding or clotting? No    Do you have an rashes or other skin lesions? No         "

## 2019-09-18 NOTE — PROGRESS NOTES
HISTORY OF PRESENT ILLNESS  Mr. Roland is a pleasant 64 year old year old male who presents to clinic today with neck pain  Paul explains that he has had on/off pain in neck and right shoulder  Location: right neck  Quality:  achy pain    Severity: 5/10 at worst    Duration: months  Timing: occurs intermittently    Modifying factors:  resting and non-use makes it better, movement and use makes it worse  Associated signs & symptoms: right arm radicular pain  Works as  and airport baggage  Additional history: as documented    MEDICAL HISTORY  There is no problem list on file for this patient.      Current Outpatient Medications   Medication Sig Dispense Refill     ibuprofen (ADVIL/MOTRIN) 200 MG tablet        meloxicam (MOBIC) 15 MG tablet Take 1 tablet (15 mg) by mouth daily 30 tablet 3     NEW MED FV Trimix #4:  Trimix intracavernosal injection, per mL:  PGE1: 40 mcg  Papaverine: 27.6mg  Phentolamine: 1 mg    Sig: Inject 0.1mL intracavernosal as directed.    Max use one daily and up to 3x/week.  May titrate up in 0.1mL increments as needed.  Use lowest effective dose. 5 mL prn     omeprazole (PRILOSEC OTC) 20 MG EC tablet Take 1 tablet (20 mg) by mouth daily (Patient not taking: Reported on 6/12/2019) 30 tablet 3       No Known Allergies    Family History   Problem Relation Age of Onset     Liver Cancer Mother        Additional medical/Social/Surgical histories reviewed in [facility] and updated as appropriate.     REVIEW OF SYSTEMS (9/18/2019)  10 point ROS of systems including Constitutional, Eyes, Respiratory, Cardiovascular, Gastroenterology, Genitourinary, Integumentary, Musculoskeletal, Psychiatric were all negative except for pertinent positives noted in my HPI.     PHYSICAL EXAM  Vitals:    09/18/19 1037   BP: (!) 158/104   Pulse: 71   Height: 1.829 m (6')     Vital Signs: BP (!) 158/104   Pulse 71   Ht 1.829 m (6')   BMI 26.31 kg/m   Patient declined being weighed. Body mass index is 26.31  kg/m .    Vital Signs: BP (!) 158/104   Pulse 71   Ht 1.829 m (6')   BMI 26.31 kg/m   Patient declined being weighed. Body mass index is 26.31 kg/m .    General  - normal appearance, in no obvious distress  CV  - normal radial pulse  Pulm  - normal respiratory pattern, non-labored  Musculoskeletal -neck and right shoulder  - inspection: normal bone and joint alignment, no obvious deformity, no scapular winging, no AC step-off  - palpation: mildly tender trap, normal clavicle, non-tender AC  - ROM:  Has some pain with flexion and extension of neck, radiates into right arm  - strength: 5/5  strength, 5/5 in all shoulder planes  - special tests:  Slightly positive spurlings on right  Neuro  - no sensory or motor deficit, grossly normal coordination, normal muscle tone  Skin  - no ecchymosis, erythema, warmth, or induration, no obvious rash  Psych  - interactive, appropriate, normal mood and affect  ASSESSMENT & PLAN  65 yo male with cervical radicular pain  Reviewed HEP  Given medications see orders  F/u in a few weeks if not improving and will consider MRI      Sterling Escalona MD, CAQSM

## 2019-10-18 ENCOUNTER — TELEPHONE (OUTPATIENT)
Dept: GASTROENTEROLOGY | Facility: OUTPATIENT CENTER | Age: 64
End: 2019-10-18

## 2019-10-18 NOTE — TELEPHONE ENCOUNTER
Patient taking any blood thinners ? No     Heart disease ? No     Lung disease ? No       Sleep apnea ? No     Diabetic ? No     Kidney disease ? No     Electronic implanted medical devices ? No     Are you taking any narcotic pain medication ? No        PTSD ? N/a     Prep instructions reviewed with patient ? Instructions, policy,MAC sedation plan reviewed. Advised patient to have someone stay with them post exam.    Pharmacy : N/a    Indication for procedure : Screening colonoscopy    Referring provider :Zoey Ye MD     Arrival Time : 12 PM     : Yes

## 2019-10-25 ENCOUNTER — TRANSFERRED RECORDS (OUTPATIENT)
Dept: HEALTH INFORMATION MANAGEMENT | Facility: CLINIC | Age: 64
End: 2019-10-25

## 2019-10-25 ENCOUNTER — DOCUMENTATION ONLY (OUTPATIENT)
Dept: GASTROENTEROLOGY | Facility: OUTPATIENT CENTER | Age: 64
End: 2019-10-25
Payer: COMMERCIAL

## 2019-10-29 LAB — COPATH REPORT: NORMAL

## 2019-11-14 ENCOUNTER — TELEPHONE (OUTPATIENT)
Dept: ORTHOPEDICS | Facility: CLINIC | Age: 64
End: 2019-11-14

## 2019-11-14 DIAGNOSIS — M54.12 CERVICAL RADICULAR PAIN: ICD-10-CM

## 2019-11-14 RX ORDER — DICLOFENAC SODIUM 75 MG/1
TABLET, DELAYED RELEASE ORAL
Qty: 40 TABLET | Refills: 1 | Status: SHIPPED | OUTPATIENT
Start: 2019-11-14 | End: 2020-02-14

## 2019-11-14 NOTE — TELEPHONE ENCOUNTER
[facility] Call Center    Phone Message    May a detailed message be left on voicemail: yes    Reason for Call: Medication Refill Request    Has the patient contacted the pharmacy for the refill? Yes   Name of medication being requested:diclofenac (VOLTAREN) 75 MG EC tablet   Provider who prescribed the medication: Dr. Escalona  Pharmacy: [facility] [address]  Date medication is needed: As quickly as possible.   This pt is calling after speaking with his pharmacy about the medication above.  He's hoping to expedite this refill as he is out.  He was informed that we have a 72 hour turn around on refills.  He would appreciate a call with any update on this.      Action Taken: Message routed to:  [facility] Ortho

## 2019-11-26 NOTE — TELEPHONE ENCOUNTER
Message left for patient to call the clinic.  Patient has a 20 day supply of Voltaren tablets, prescribed 11/14/19, which should last until 12/04/19.  He then has one refill on the prescription.

## 2020-02-13 DIAGNOSIS — M54.12 CERVICAL RADICULAR PAIN: ICD-10-CM

## 2020-02-14 DIAGNOSIS — M54.12 CERVICAL RADICULAR PAIN: ICD-10-CM

## 2020-02-14 RX ORDER — DICLOFENAC SODIUM 75 MG/1
TABLET, DELAYED RELEASE ORAL
Qty: 40 TABLET | Refills: 1
Start: 2020-02-14 | End: 2020-04-01

## 2020-02-14 RX ORDER — DICLOFENAC SODIUM 75 MG/1
TABLET, DELAYED RELEASE ORAL
Qty: 40 TABLET | Refills: 1 | Status: SHIPPED | OUTPATIENT
Start: 2020-02-14 | End: 2020-02-14

## 2020-02-27 ENCOUNTER — OFFICE VISIT (OUTPATIENT)
Dept: ORTHOPEDICS | Facility: CLINIC | Age: 65
End: 2020-02-27
Payer: COMMERCIAL

## 2020-02-27 ENCOUNTER — ANCILLARY PROCEDURE (OUTPATIENT)
Dept: GENERAL RADIOLOGY | Facility: CLINIC | Age: 65
End: 2020-02-27
Attending: PREVENTIVE MEDICINE
Payer: COMMERCIAL

## 2020-02-27 VITALS — HEIGHT: 72 IN | BODY MASS INDEX: 25.73 KG/M2 | WEIGHT: 190 LBS

## 2020-02-27 DIAGNOSIS — M54.12 CERVICAL RADICULAR PAIN: ICD-10-CM

## 2020-02-27 DIAGNOSIS — M50.30 DDD (DEGENERATIVE DISC DISEASE), CERVICAL: ICD-10-CM

## 2020-02-27 DIAGNOSIS — M54.12 CERVICAL RADICULAR PAIN: Primary | ICD-10-CM

## 2020-02-27 RX ORDER — METHYLPREDNISOLONE 4 MG
TABLET, DOSE PACK ORAL
Qty: 21 TABLET | Refills: 0 | Status: SHIPPED | OUTPATIENT
Start: 2020-02-27 | End: 2021-04-07

## 2020-02-27 ASSESSMENT — MIFFLIN-ST. JEOR: SCORE: 1674.83

## 2020-02-27 NOTE — PROGRESS NOTES
SPORTS & ORTHOPEDIC WALK-IN VISIT 2/27/2020    Primary Care Physician: Dr. Meade    He thinks he [place] his shoulder [place]. About 2 weeks ago he had neck pain and he wasn't able to turn his head either way. Previous similar neck issue on 9/18/19.    Reason for visit:     What part of your body is injured / painful?  neck    What caused the injury /pain? Unsure    How long ago did your injury occur or pain begin? several weeks ago    What are your most bothersome symptoms? Pain    How would you characterize your symptom?  sharp    What makes your symptoms better? Rest    What makes your symptoms worse? Movement    Have you been previously seen for this problem? Yes, [place] 9/18/19    Medical History:    Any recent changes to your medical history? No    Any new medication prescribed since last visit? No    Have you had surgery on this body part before? No    Social History:    Occupation: Airport    Handedness: Right    Exercise:     Review of Systems:    Do you have fever, chills, weight loss? No    Do you have any vision problems? No    Do you have any chest pain or edema? No    Do you have any shortness of breath or wheezing?  No    Do you have stomach problems? No    Do you have any numbness or focal weakness? No    Do you have diabetes? No    Do you have problems with bleeding or clotting? No    Do you have an rashes or other skin lesions? No

## 2020-02-27 NOTE — PROGRESS NOTES
HISTORY OF PRESENT ILLNESS  Mr. 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      Current Outpatient Medications   Medication Sig Dispense Refill     diclofenac (VOLTAREN) 75 MG EC tablet TAKE 1 TABLET BY MOUTH TWICE A DAY 40 tablet 1     ibuprofen (ADVIL/MOTRIN) 200 MG tablet        meloxicam (MOBIC) 15 MG tablet Take 1 tablet (15 mg) by mouth daily 30 tablet 3     methylPREDNISolone (MEDROL DOSEPAK) 4 MG tablet therapy pack Follow Package Directions 21 tablet 0     NEW MED FV Trimix #4:  Trimix intracavernosal injection, per mL:  PGE1: 40 mcg  Papaverine: 27.6mg  Phentolamine: 1 mg    Sig: Inject 0.1mL intracavernosal as directed.    Max use one daily and up to 3x/week.  May titrate up in 0.1mL increments as needed.  Use lowest effective dose. 5 mL prn     omeprazole (PRILOSEC OTC) 20 MG EC tablet Take 1 tablet (20 mg) by mouth daily (Patient not taking: Reported on 6/12/2019) 30 tablet 3       No Known Allergies    Family History   Problem Relation Age of Onset     Liver Cancer Mother      Social History     Socioeconomic History     Marital status: Single     Spouse name: Not on file     Number of children: Not on file     Years of education: Not on file     Highest education level: Not on file   Occupational History     Not on file   Social Needs     Financial resource strain: Not on file     Food insecurity:     Worry: Not on file     Inability: Not on file     Transportation needs:     Medical: Not on file     Non-medical: Not on file   Tobacco Use     Smoking status: Current Every Day Smoker     Smokeless tobacco: Never Used   Substance and  Sexual Activity     Alcohol use: Yes     Frequency: 2-4 times a month     Drug use: Never     Sexual activity: Yes   Lifestyle     Physical activity:     Days per week: Not on file     Minutes per session: Not on file     Stress: Not on file   Relationships     Social connections:     Talks on phone: Not on file     Gets together: Not on file     Attends Taoism service: Not on file     Active member of club or organization: Not on file     Attends meetings of clubs or organizations: Not on file     Relationship status: Not on file     Intimate partner violence:     Fear of current or ex partner: Not on file     Emotionally abused: Not on file     Physically abused: Not on file     Forced sexual activity: Not on file   Other Topics Concern     Parent/sibling w/ CABG, MI or angioplasty before 65F 55M? Not Asked   Social History Narrative     Not on file       Additional medical/Social/Surgical histories reviewed in [facility] and updated as appropriate.     REVIEW OF SYSTEMS (2/27/2020)  10 point ROS of systems including Constitutional, Eyes, Respiratory, Cardiovascular, Gastroenterology, Genitourinary, Integumentary, Musculoskeletal, Psychiatric, Allergic/Immunologic were all negative except for pertinent positives noted in my HPI.     PHYSICAL EXAM  Vitals:    02/27/20 1510   Weight: 86.2 kg (190 lb)   Height: 1.829 m (6')     Vital Signs: Ht 1.829 m (6')   Wt 86.2 kg (190 lb)   BMI 25.77 kg/m   Patient declined being weighed. Body mass index is 25.77 kg/m .    General  - normal appearance, in no obvious distress  CV  - normal radial pulse  Pulm  - normal respiratory pattern, non-labored  Musculoskeletal -neck  - inspection: normal bone and joint alignment, no obvious deformity, no scapular winging, no AC step-off  - palpation: no bony or soft tissue tenderness, except along paraspinal muscles and upper back,  normal clavicle, non-tender AC  - ROM:  Some limited ROM due to pain, some pain with flexion and extension,  slightly positive spurlings  - strength: 5/5  strength, 5/5 in all shoulder planes    Neuro  - no sensory or motor deficit, grossly normal coordination, normal muscle tone  Skin  - no ecchymosis, erythema, warmth, or induration, no obvious rash  Psych  - interactive, appropriate, normal mood and affect      ASSESSMENT & PLAN  66 yo male with cervical ddd, radicular pain, not resolved  Reviewed cervical xray today: shows ddd  Consider MRI  Medrol pack and tizanadine PRN  Tylenol PRN  Given HEP  F/u in 1 month and consider cervical MRI if not improved  No orders of the defined types were placed in this encounter.       Sterling Escalona MD, CAQSM

## 2020-02-27 NOTE — LETTER
2/27/2020       RE: Paul Roland  [address]  [place] [address]  [place] [address]     Dear Colleague,    Thank you for referring your patient, Paul Roland, to the [facility] SPORTS AND ORTHOPAEDIC WALK IN CLINIC at [facility]. Please see a copy of my visit note below.          SPORTS & ORTHOPEDIC WALK-IN VISIT 2/27/2020    Primary Care Physician: Dr. Meade    He thinks he [place] About 2 weeks ago he had neck pain and he wasn't able to turn his head either way. Previous similar neck issue on 9/18/19.    Reason for visit:     What part of your body is injured / painful?  neck    What caused the injury /pain? Unsure    How long ago did your injury occur or pain begin? several weeks ago    What are your most bothersome symptoms? Pain    How would you characterize your symptom?  sharp    What makes your symptoms better? Rest    What makes your symptoms worse? Movement    Have you been previously seen for this problem? Yes, [place] 9/18/19    Medical History:    Any recent changes to your medical history? No    Any new medication prescribed since last visit? No    Have you had surgery on this body part before? No    Social History:    Occupation: [employer]    Handedness: Right    Exercise:     Review of Systems:    Do you have fever, chills, weight loss? No    Do you have any vision problems? No    Do you have any chest pain or edema? No    Do you have any shortness of breath or wheezing?  No    Do you have stomach problems? No    Do you have any numbness or focal weakness? No    Do you have diabetes? No    Do you have problems with bleeding or clotting? No    Do you have an rashes or other skin lesions? No           HISTORY OF PRESENT ILLNESS  Mr. 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      Current Outpatient Medications   Medication Sig Dispense Refill     diclofenac (VOLTAREN) 75 MG EC tablet TAKE 1 TABLET BY MOUTH TWICE A DAY 40 tablet 1     ibuprofen (ADVIL/MOTRIN) 200 MG tablet        meloxicam (MOBIC) 15 MG tablet Take 1 tablet (15 mg) by mouth daily 30 tablet 3     methylPREDNISolone (MEDROL DOSEPAK) 4 MG tablet therapy pack Follow Package Directions 21 tablet 0     NEW MED FV Trimix #4:  Trimix intracavernosal injection, per mL:  PGE1: 40 mcg  Papaverine: 27.6mg  Phentolamine: 1 mg    Sig: Inject 0.1mL intracavernosal as directed.    Max use one daily and up to 3x/week.  May titrate up in 0.1mL increments as needed.  Use lowest effective dose. 5 mL prn     omeprazole (PRILOSEC OTC) 20 MG EC tablet Take 1 tablet (20 mg) by mouth daily (Patient not taking: Reported on 6/12/2019) 30 tablet 3       No Known Allergies    Family History   Problem Relation Age of Onset     Liver Cancer Mother      Social History     Socioeconomic History     Marital status: Single     Spouse name: Not on file     Number of children: Not on file     Years of education: Not on file     Highest education level: Not on file   Occupational History     Not on file   Social Needs     Financial resource strain: Not on file     Food insecurity:     Worry: Not on file     Inability: Not on file     Transportation needs:     Medical: Not on file     Non-medical: Not on file   Tobacco Use     Smoking status: Current Every Day Smoker     Smokeless tobacco: Never Used   Substance and Sexual Activity     Alcohol use: Yes     Frequency: 2-4 times a month     Drug use: Never     Sexual activity: Yes   Lifestyle     Physical activity:     Days per week: Not on file     Minutes  per session: Not on file     Stress: Not on file   Relationships     Social connections:     Talks on phone: Not on file     Gets together: Not on file     Attends Mandaen service: Not on file     Active member of club or organization: Not on file     Attends meetings of clubs or organizations: Not on file     Relationship status: Not on file     Intimate partner violence:     Fear of current or ex partner: Not on file     Emotionally abused: Not on file     Physically abused: Not on file     Forced sexual activity: Not on file   Other Topics Concern     Parent/sibling w/ CABG, MI or angioplasty before 65F 55M? Not Asked   Social History Narrative     Not on file       Additional medical/Social/Surgical histories reviewed in [facility] and updated as appropriate.     REVIEW OF SYSTEMS (2/27/2020)  10 point ROS of systems including Constitutional, Eyes, Respiratory, Cardiovascular, Gastroenterology, Genitourinary, Integumentary, Musculoskeletal, Psychiatric, Allergic/Immunologic were all negative except for pertinent positives noted in my HPI.     PHYSICAL EXAM  Vitals:    02/27/20 1510   Weight: 86.2 kg (190 lb)   Height: 1.829 m (6')     Vital Signs: Ht 1.829 m (6')   Wt 86.2 kg (190 lb)   BMI 25.77 kg/m    Patient declined being weighed. Body mass index is 25.77 kg/m .    General  - normal appearance, in no obvious distress  CV  - normal radial pulse  Pulm  - normal respiratory pattern, non-labored  Musculoskeletal -neck  - inspection: normal bone and joint alignment, no obvious deformity, no scapular winging, no AC step-off  - palpation: no bony or soft tissue tenderness, except along paraspinal muscles and upper back,  normal clavicle, non-tender AC  - ROM:  Some limited ROM due to pain, some pain with flexion and extension, slightly positive spurlings  - strength: 5/5  strength, 5/5 in all shoulder planes    Neuro  - no sensory or motor deficit, grossly normal coordination, normal muscle tone  Skin  - no  ecchymosis, erythema, warmth, or induration, no obvious rash  Psych  - interactive, appropriate, normal mood and affect      ASSESSMENT & PLAN  66 yo male with cervical ddd, radicular pain, not resolved  Reviewed cervical xray today: shows ddd  Consider MRI  Medrol pack and tizanadine PRN  Tylenol PRN  Given HEP  F/u in 1 month and consider cervical MRI if not improved  No orders of the defined types were placed in this encounter.     Again, thank you for allowing me to participate in the care of your patient.      Sincerely,    Sterling Escalona MD

## 2020-03-27 NOTE — TELEPHONE ENCOUNTER
Tobacco Treatment Program at the [facility] attempted to reach Mr. Roland on 3/27/2020 regarding the tobacco cessation program to help Mr. Roland to quit smoking. We will attempt to reach Mr. Roland another time.     Mame Bennett [facility]  Tobacco Treatment Specialist  PH: [phone]

## 2020-04-01 DIAGNOSIS — M54.12 CERVICAL RADICULAR PAIN: ICD-10-CM

## 2020-04-01 RX ORDER — DICLOFENAC SODIUM 75 MG/1
TABLET, DELAYED RELEASE ORAL
Qty: 40 TABLET | Refills: 1 | Status: SHIPPED | OUTPATIENT
Start: 2020-04-01 | End: 2020-08-19

## 2020-04-16 NOTE — TELEPHONE ENCOUNTER
Pt reports he works daily on quitting smoking, but some days are better than others. He continues to smoke with desire to quit. He reports he wants an annual physical to assess his current state of health but is having to wait until stay-at-home orders have been lifted. He will make increased efforts to quit after this. Does not feel tobacco has impacted his health so far.     Requests TTS to follow up in a few months.     Tobacco Treatment Program at the [facility] attempted to reach Mr. Roland on 4/16/2020 regarding the tobacco cessation program to help Mr. Roland to quit smoking. We will attempt to reach Mr. Roland another time.     Mame Bennett, [facility]  Tobacco Treatment Specialist  PH: [phone]

## 2020-06-10 ENCOUNTER — ANCILLARY PROCEDURE (OUTPATIENT)
Dept: CT IMAGING | Facility: CLINIC | Age: 65
End: 2020-06-10
Attending: STUDENT IN AN ORGANIZED HEALTH CARE EDUCATION/TRAINING PROGRAM
Payer: COMMERCIAL

## 2020-06-10 DIAGNOSIS — Z85.038 HX OF COLON CANCER, STAGE II: ICD-10-CM

## 2020-06-10 DIAGNOSIS — Z87.891 FORMER SMOKER: ICD-10-CM

## 2020-06-10 DIAGNOSIS — Z08 ENCOUNTER FOR FOLLOW-UP SURVEILLANCE OF COLON CANCER: ICD-10-CM

## 2020-06-10 DIAGNOSIS — Z85.038 ENCOUNTER FOR FOLLOW-UP SURVEILLANCE OF COLON CANCER: ICD-10-CM

## 2020-06-10 RX ORDER — IOPAMIDOL 755 MG/ML
116 INJECTION, SOLUTION INTRAVASCULAR ONCE
Status: COMPLETED | OUTPATIENT
Start: 2020-06-10 | End: 2020-06-10

## 2020-06-10 RX ADMIN — IOPAMIDOL 116 ML: 755 INJECTION, SOLUTION INTRAVASCULAR at 13:50

## 2020-06-17 ENCOUNTER — OFFICE VISIT (OUTPATIENT)
Dept: INTERNAL MEDICINE | Facility: CLINIC | Age: 65
End: 2020-06-17
Payer: COMMERCIAL

## 2020-06-17 VITALS
DIASTOLIC BLOOD PRESSURE: 89 MMHG | HEART RATE: 71 BPM | SYSTOLIC BLOOD PRESSURE: 127 MMHG | OXYGEN SATURATION: 96 % | WEIGHT: 201.1 LBS | BODY MASS INDEX: 27.27 KG/M2

## 2020-06-17 DIAGNOSIS — E78.5 HYPERLIPIDEMIA, UNSPECIFIED HYPERLIPIDEMIA TYPE: ICD-10-CM

## 2020-06-17 DIAGNOSIS — Z85.038 HX OF COLON CANCER, STAGE II: Primary | ICD-10-CM

## 2020-06-17 DIAGNOSIS — N52.9 ERECTILE DYSFUNCTION, UNSPECIFIED ERECTILE DYSFUNCTION TYPE: ICD-10-CM

## 2020-06-17 DIAGNOSIS — Z00.00 ROUTINE HEALTH MAINTENANCE: ICD-10-CM

## 2020-06-17 DIAGNOSIS — Z85.038 HX OF COLON CANCER, STAGE II: ICD-10-CM

## 2020-06-17 DIAGNOSIS — Z23 ENCOUNTER FOR IMMUNIZATION: ICD-10-CM

## 2020-06-17 LAB
ALBUMIN SERPL-MCNC: 4 G/DL (ref 3.4–5)
ALP SERPL-CCNC: 62 U/L (ref 40–150)
ALT SERPL W P-5'-P-CCNC: 27 U/L (ref 0–70)
ANION GAP SERPL CALCULATED.3IONS-SCNC: 4 MMOL/L (ref 3–14)
AST SERPL W P-5'-P-CCNC: 19 U/L (ref 0–45)
BASOPHILS # BLD AUTO: 0.1 10E9/L (ref 0–0.2)
BASOPHILS NFR BLD AUTO: 1.2 %
BILIRUB SERPL-MCNC: 0.4 MG/DL (ref 0.2–1.3)
BUN SERPL-MCNC: 9 MG/DL (ref 7–30)
CALCIUM SERPL-MCNC: 9.3 MG/DL (ref 8.5–10.1)
CEA SERPL-MCNC: 1 UG/L (ref 0–2.5)
CHLORIDE SERPL-SCNC: 104 MMOL/L (ref 94–109)
CHOLEST SERPL-MCNC: 251 MG/DL
CO2 SERPL-SCNC: 29 MMOL/L (ref 20–32)
CREAT SERPL-MCNC: 0.99 MG/DL (ref 0.66–1.25)
DIFFERENTIAL METHOD BLD: NORMAL
EOSINOPHIL # BLD AUTO: 0.1 10E9/L (ref 0–0.7)
EOSINOPHIL NFR BLD AUTO: 2.3 %
ERYTHROCYTE [DISTWIDTH] IN BLOOD BY AUTOMATED COUNT: 13.5 % (ref 10–15)
GFR SERPL CREATININE-BSD FRML MDRD: 78 ML/MIN/{1.73_M2}
GLUCOSE SERPL-MCNC: 93 MG/DL (ref 70–99)
HCT VFR BLD AUTO: 45 % (ref 40–53)
HDLC SERPL-MCNC: 100 MG/DL
HGB BLD-MCNC: 14.8 G/DL (ref 13.3–17.7)
IMM GRANULOCYTES # BLD: 0 10E9/L (ref 0–0.4)
IMM GRANULOCYTES NFR BLD: 0.5 %
LDLC SERPL CALC-MCNC: 127 MG/DL
LYMPHOCYTES # BLD AUTO: 1.6 10E9/L (ref 0.8–5.3)
LYMPHOCYTES NFR BLD AUTO: 37.1 %
MCH RBC QN AUTO: 30.6 PG (ref 26.5–33)
MCHC RBC AUTO-ENTMCNC: 32.9 G/DL (ref 31.5–36.5)
MCV RBC AUTO: 93 FL (ref 78–100)
MONOCYTES # BLD AUTO: 0.5 10E9/L (ref 0–1.3)
MONOCYTES NFR BLD AUTO: 10.6 %
NEUTROPHILS # BLD AUTO: 2.1 10E9/L (ref 1.6–8.3)
NEUTROPHILS NFR BLD AUTO: 48.3 %
NONHDLC SERPL-MCNC: 151 MG/DL
NRBC # BLD AUTO: 0 10*3/UL
NRBC BLD AUTO-RTO: 0 /100
PLATELET # BLD AUTO: 175 10E9/L (ref 150–450)
PLATELET # BLD EST: NORMAL 10*3/UL
POTASSIUM SERPL-SCNC: 4 MMOL/L (ref 3.4–5.3)
PROT SERPL-MCNC: 8.2 G/DL (ref 6.8–8.8)
RBC # BLD AUTO: 4.84 10E12/L (ref 4.4–5.9)
SODIUM SERPL-SCNC: 138 MMOL/L (ref 133–144)
TRIGL SERPL-MCNC: 122 MG/DL
WBC # BLD AUTO: 4.3 10E9/L (ref 4–11)

## 2020-06-17 RX ORDER — ATORVASTATIN CALCIUM 20 MG/1
20 TABLET, FILM COATED ORAL DAILY
Qty: 100 TABLET | Refills: 3 | Status: SHIPPED | OUTPATIENT
Start: 2020-06-17 | End: 2023-10-18

## 2020-06-17 ASSESSMENT — PAIN SCALES - GENERAL: PAINLEVEL: NO PAIN (0)

## 2020-06-17 NOTE — PROGRESS NOTES
HPI  65-year-old presents today for physical examination.  His past history is significant for rectal cancer status post surgical resection.  He had a recent negative colonoscopy within the last year.  He has been doing well in that regard.  He is a little less active now as he has been laid off from his job at the [employer].  He still does some walking on a regular basis.  He reports his diet is somewhat improved with a focus on fruits and vegetables.  Weight is up slightly.  Otherwise he has no specific symptoms or problems today.  He has ongoing erectile dysfunction which improves with injection therapy.  Past Medical History:   Diagnosis Date     BPH (benign prostatic hyperplasia)      Hyperlipidemia      Rectal cancer (H) 08/10/2017    Clinical: Stage I (T2, N0, M0)      Past Surgical History:   Procedure Laterality Date     RECTAL SURGERY  08/16/2017    with diverting loop ileostomy      TURP       Family History   Problem Relation Age of Onset     Liver Cancer Mother      Social History     Socioeconomic History     Marital status: Single     Spouse name: None     Number of children: None     Years of education: None     Highest education level: None   Occupational History     None   Social Needs     Financial resource strain: None     Food insecurity     Worry: None     Inability: None     Transportation needs     Medical: None     Non-medical: None   Tobacco Use     Smoking status: Current Every Day Smoker     Smokeless tobacco: Never Used   Substance and Sexual Activity     Alcohol use: Yes     Frequency: 2-4 times a month     Drug use: Never     Sexual activity: Yes   Lifestyle     Physical activity     Days per week: None     Minutes per session: None     Stress: None   Relationships     Social connections     Talks on phone: None     Gets together: None     Attends Mormonism service: None     Active member of club or organization: None     Attends meetings of clubs or organizations: None      Relationship status: None     Intimate partner violence     Fear of current or ex partner: None     Emotionally abused: None     Physically abused: None     Forced sexual activity: None   Other Topics Concern     Parent/sibling w/ CABG, MI or angioplasty before 65F 55M? Not Asked   Social History Narrative     None     Complete review of symptoms negative except as noted above.    Exam:  /89 (BP Location: Right arm, Patient Position: Sitting, Cuff Size: Adult Regular)   Pulse 71   Wt 91.2 kg (201 lb 1.6 oz)   SpO2 96%   BMI 27.27 kg/m    201 lbs 1.6 oz  Physical Exam   The patient is alert, oriented with a clear sensorium.   Skin shows no lesions or rashes and good turgor.   Head is normocephalic and atraumatic.   Eyes show PERRLA   Ears show normal TMs bilaterally.   Mouth shows clear oral mucosa.   Neck shows no nodes, thyromegaly or bruits.   Back is non tender.  Lungs are clear to percussion and auscultation.   Heart shows normal S1 and S2 without murmur or gallop.   Abdomen is soft and nontender without masses or organomegaly.   Genitalia show normal testes. No evidence of inguinal hernia.  Rectal shows small smooth prostate without nodules or masses.  Extremities show no edema and no evidence of active synovitis.   Neurologic examination shows cranial nerves II-XII intact. Motor shows 5/5 strength. Reflexes are symmetric. Cerebellar testing shows normal tandem gait.  Romberg negative.    ASSESSMENT  1 rectal cancer in remission  2 hyperlipidemia needs atorvastatin  3 erectile dysfunction    Plan  He needs a Tdap and Pneumovax.  We will reassess his labs and encourage him regarding his exercise and diet.    This note was completed using Dragon voice recognition software.  Although reviewed after completion, some word and grammatical errors may occur.    Delano Kimball MD  General Internal Medicine  [facility]  [phone]

## 2020-06-17 NOTE — NURSING NOTE
Patient received Pneumococcal 23 and TDAP vaccine. Vaccine was given under the supervision of Dr. Kimball. See immunization list for administration details. Pt was advised to remain in [facility] lobby for 15 minutes in case of an averse reaction. Given by Marietta Bonilla CMA, EMT 2:59 PM 6/17/2020

## 2020-06-17 NOTE — NURSING NOTE
Chief Complaint   Patient presents with     Physical     Pt would like to get a physical today     Establish Care     Pt would like to establish care today      Marietta Bonilla, EMT at 1:29 PM sign on 6/17/2020

## 2020-07-10 ENCOUNTER — TELEPHONE (OUTPATIENT)
Dept: INTERNAL MEDICINE | Facility: CLINIC | Age: 65
End: 2020-07-10

## 2020-07-10 NOTE — TELEPHONE ENCOUNTER
[facility] Call Center    Phone Message    May a detailed message be left on voicemail: yes     Reason for Call: Requesting Results   Name/type of test: Labs, CT  Date of test: 06/10, 06/17  Was test done at a location other than [facility] (Please fill in the location if not [facility])?: No      Action Taken: Message routed to:  [facility] ([facility]    Travel Screening: Not Applicable

## 2020-07-13 ENCOUNTER — VIRTUAL VISIT (OUTPATIENT)
Dept: URGENT CARE | Facility: CLINIC | Age: 65
End: 2020-07-13
Payer: COMMERCIAL

## 2020-07-13 DIAGNOSIS — R09.89 CHEST CONGESTION: Primary | ICD-10-CM

## 2020-07-13 DIAGNOSIS — R50.9 FEVER, UNSPECIFIED FEVER CAUSE: ICD-10-CM

## 2020-07-13 PROCEDURE — 99213 OFFICE O/P EST LOW 20 MIN: CPT | Mod: 95 | Performed by: NURSE PRACTITIONER

## 2020-07-13 NOTE — PROGRESS NOTES
"Paul Roland is a 65 year old male who is being evaluated via a billable telephone visit.      The patient has been notified of following:     \"This telephone visit will be conducted via a call between you and your physician/provider. We have found that certain health care needs can be provided without the need for a physical exam.  This service lets us provide the care you need with a short phone conversation.  If a prescription is necessary we can send it directly to your pharmacy.  If lab work is needed we can place an order for that and you can then stop by our lab to have the test done at a later time.    Telephone visits are billed at different rates depending on your insurance coverage. During this emergency period, for some insurers they may be billed the same as an in-person visit.  Please reach out to your insurance provider with any questions.    If during the course of the call the physician/provider feels a telephone visit is not appropriate, you will not be charged for this service.\"    Patient has given verbal consent for Telephone visit? Yes  What phone number would you like to be contacted at? [phone]        Subjective     Paul Roland is a 65 year old male who presents via phone visit today for the following health issues:    HPI  Mild congestion and feverish. No cough, sob, sore throat, GI or  complaints[place] for 3 [place]. Lives in [place] large apartment [place]. Not aware of being exposed. Is a smoker.         Review of Systems Positive for congestion, mild and feverish. No sore throat, cough, sob, GI or  complaints         Objective   Reported vitals:  There were no vitals taken for this visit.     PSYCH: Alert and oriented times 3; coherent speech, normal   rate and volume, able to articulate logical thoughts, able   to abstract reason, no tangential thoughts, no hallucinations   or delusions   RESP: No cough, no audible wheezing, able to talk " in full sentences  Remainder of exam unable to be completed due to telephone visits            Assessment/Plan:   Diagnosis Comments   1. Chest congestion  Symptomatic COVID-19 Virus (Coronavirus) by PCR    2. Fever, unspecified fever cause  Symptomatic COVID-19 Virus (Coronavirus) by PCR      Increase rest and fluids. Tylenol  for comfort. Cool mist vaporizer. If your symptoms worsen or do not resolve follow up with your primary care provider in 1 week and sooner if needed.        Indications for emergent return to emergency department discussed with patient, who verbalized good understanding and agreement.  Patient understands the limitations of today's evaluation.           Patient Education     Coronavirus Disease 2019 (COVID-19): Caring for Yourself or Others  If you or a household member have symptoms of COVID-19, follow the guidelines below. This will help you manage symptoms and keep the virus from spreading.  If you have symptoms of COVID-19    Stay home and contact your care team. They will tell you what to do.    Don t panic. Keep in mind that other illnesses can cause similar symptoms.    Stay away from work, school, and public places.    Limit physical contact with others in your home. Limit visitors. No kissing.    Clean surfaces you touch with disinfectant.    If you need to cough or sneeze, do it into a tissue. Then, throw the tissue into the trash. If you don't have tissues, cough or sneeze into the bend of your elbow    Don t share food or personal items with people in your household. This includes items like eating and drinking utensils, towels, and bedding.    Wear a cloth face mask around other people. It should cover your nose and mouth. You may need to make your own mask using a bandana, T-shirt, or other cloth. See the CDC s instructions on how to make a mask.    If you need to go to a hospital or clinic, call ahead to let them know. Expect the care team to wear masks, gowns, gloves, and eye  protection. You may be put in a separate room.    Follow all instructions from your care team.  If you ve been diagnosed with COVID-19    Stay home and away from others, including other people in your home. (This is called self-isolation.) Don t leave home unless you need to get medical care. Don't go to work, school, or public places. Don't use buses, taxis, or other public transportation.    Follow all instructions from your care team.    If you need to go to a hospital or clinic, call ahead to let them know. Expect the care team to wear masks, gowns, gloves, and eye protection. You may be put in a separate room.    Wear a face mask over your nose and mouth. This is to protect others from your germs. If you can t wear a mask, your caregivers should wear one. You may need to make your own mask using a bandana, T-shirt, or other cloth. See the [facility] s instructions on how to make a mask.    Have no contact with pets and other animals.    Don t share food or personal items with people in your household. This includes items like eating and drinking utensils, towels, and bedding.    If you need to cough or sneeze, do it into a tissue. Then, throw the tissue into the trash. If you don't have tissues, cough or sneeze into the bend of your elbow.    Wash your hands often.  Self-care at home  At this time, there is no medicine approved to prevent or treat COVID-19. Experts are testing different medicines, trying to find one that works.  So far, the most proven treatment is to support your body while it fights the virus.    Get plenty of rest.    Drink extra fluids (6 to 8 glasses of liquids each day), unless a doctor has told you not to. Ask your care team which fluids are best for you. Avoid fluids that contain caffeine or alcohol.    Take over-the-counter (OTC) medicine to help reduce pain and fever. Your care team will tell you which OTC medicine to use.  If you ve been in the hospital for COVID-19, follow your care team s  instructions. This may include changing positions to help your breathing (such as lying on your belly).  If a test showed that you have COVID-19, you may be asked to donate plasma after you ve recovered. (This is called COVID-19 convalescent plasma donation.) The plasma may contain antibodies to help fight the virus in others. Scientists are testing whether this might be a treatment in the future. For more information, talk to your care team.  Caring for a sick person    Follow all instructions from the care team.    Wash your hands often.    Wear protective clothing as advised.    Make sure the sick person wears a mask. If they can't wear a mask, don't stay in the same room with them. If you must be in the same room, wear a face mask. Make sure the mask covers both the nose and mouth.    Keep track of the sick person s symptoms.    Clean surfaces often with disinfectant. This includes phones, kitchen counters, fridge door handles, bathroom surfaces, and others.    Don t let anyone share household items with the sick person. This includes eating and drinking tools, towels, sheets, and blankets.    Clean fabrics and laundry well.    Keep other people and pets away from the sick person.  When you can stop self-isolation  When you are sick with COVID-19, you should stay away from other people. This is called self-isolation. The rules for ending self-isolation depend on your health in general.  If you are normally healthy  You can stop self-isolation when all 3 of these are true:    You ve had no fever--and no medicine that reduces fever--for 3 full days (72 hours). And     Your symptoms are better, such as cough or trouble breathing. And     At least 10 days have passed since your symptoms first started.  Talk with your care team before you leave home. They may tell you it s okay to leave, or they may give you different advice.  If you have a weak immune system  If you re being treated for cancer, have an immune  disorder (such as HIV), or have had a transplant (organ or bone marrow), follow your care team s instructions. You may be able to end self-isolation when all 3 of these are true:    You ve had no fever--and no medicine that reduces fever--for 3 full days (72 hours). And     Your symptoms are better, such as cough or trouble breathing. And     You ve had 2 tests for COVID-19. The tests happened at least 24 hours apart, and both show that you don t have the virus. (If no tests are available, your care team may tell you to follow the rules for normally healthy people above.)  When to call your care team  Call your care team right away if a sick person has any of these:    Trouble breathing    Pain or pressure in the chest  If a sick person has any of these, call 911:    Trouble breathing that gets worse    Pain or pressure in the chest that gets worse    Blue tint to lips or face    Fast or irregular heartbeat    Confusion or trouble waking    Fainting or loss of consciousness    Coughing up blood  Going home from the hospital  If you have COVID-19 and were recently in the hospital:    Follow the instructions above for self-care and isolation.    Follow the hospital care team s instructions.    Ask questions if anything is unclear to you. Write down answers so you remember them.  Last modified date: 5/8/2020  This information has been modified by your health care provider with permission from the publisher.      [id number] [facility], [address] [place] [address], [place] [address]. All rights reserved. This information is not intended as a substitute for professional medical care. Always follow your healthcare professional's instructions. This information has been modified by your health care provider with permission from the publisher.          Phone call duration:  4 minutes    .TATYANA Smallwood

## 2020-07-13 NOTE — PATIENT INSTRUCTIONS
Increase rest and fluids. Tylenol  for comfort. Cool mist vaporizer. If your symptoms worsen or do not resolve follow up with your primary care provider in 1 week and sooner if needed.        Indications for emergent return to emergency department discussed with patient, who verbalized good understanding and agreement.  Patient understands the limitations of today's evaluation.           Patient Education     Coronavirus Disease 2019 (COVID-19): Caring for Yourself or Others  If you or a household member have symptoms of COVID-19, follow the guidelines below. This will help you manage symptoms and keep the virus from spreading.  If you have symptoms of COVID-19    Stay home and contact your care team. They will tell you what to do.    Don t panic. Keep in mind that other illnesses can cause similar symptoms.    Stay away from work, school, and public places.    Limit physical contact with others in your home. Limit visitors. No kissing.    Clean surfaces you touch with disinfectant.    If you need to cough or sneeze, do it into a tissue. Then, throw the tissue into the trash. If you don't have tissues, cough or sneeze into the bend of your elbow    Don t share food or personal items with people in your household. This includes items like eating and drinking utensils, towels, and bedding.    Wear a cloth face mask around other people. It should cover your nose and mouth. You may need to make your own mask using a bandana, T-shirt, or other cloth. See the CDC s instructions on how to make a mask.    If you need to go to a hospital or clinic, call ahead to let them know. Expect the care team to wear masks, gowns, gloves, and eye protection. You may be put in a separate room.    Follow all instructions from your care team.  If you ve been diagnosed with COVID-19    Stay home and away from others, including other people in your home. (This is called self-isolation.) Don t leave home unless you need to get medical care.  Don't go to work, school, or public places. Don't use buses, taxis, or other public transportation.    Follow all instructions from your care team.    If you need to go to a hospital or clinic, call ahead to let them know. Expect the care team to wear masks, gowns, gloves, and eye protection. You may be put in a separate room.    Wear a face mask over your nose and mouth. This is to protect others from your germs. If you can t wear a mask, your caregivers should wear one. You may need to make your own mask using a bandana, T-shirt, or other cloth. See the CDC s instructions on how to make a mask.    Have no contact with pets and other animals.    Don t share food or personal items with people in your household. This includes items like eating and drinking utensils, towels, and bedding.    If you need to cough or sneeze, do it into a tissue. Then, throw the tissue into the trash. If you don't have tissues, cough or sneeze into the bend of your elbow.    Wash your hands often.  Self-care at home  At this time, there is no medicine approved to prevent or treat COVID-19. Experts are testing different medicines, trying to find one that works.  So far, the most proven treatment is to support your body while it fights the virus.    Get plenty of rest.    Drink extra fluids (6 to 8 glasses of liquids each day), unless a doctor has told you not to. Ask your care team which fluids are best for you. Avoid fluids that contain caffeine or alcohol.    Take over-the-counter (OTC) medicine to help reduce pain and fever. Your care team will tell you which OTC medicine to use.  If you ve been in the hospital for COVID-19, follow your care team s instructions. This may include changing positions to help your breathing (such as lying on your belly).  If a test showed that you have COVID-19, you may be asked to donate plasma after you ve recovered. (This is called COVID-19 convalescent plasma donation.) The plasma may contain antibodies  to help fight the virus in others. Scientists are testing whether this might be a treatment in the future. For more information, talk to your care team.  Caring for a sick person    Follow all instructions from the care team.    Wash your hands often.    Wear protective clothing as advised.    Make sure the sick person wears a mask. If they can't wear a mask, don't stay in the same room with them. If you must be in the same room, wear a face mask. Make sure the mask covers both the nose and mouth.    Keep track of the sick person s symptoms.    Clean surfaces often with disinfectant. This includes phones, kitchen counters, fridge door handles, bathroom surfaces, and others.    Don t let anyone share household items with the sick person. This includes eating and drinking tools, towels, sheets, and blankets.    Clean fabrics and laundry well.    Keep other people and pets away from the sick person.  When you can stop self-isolation  When you are sick with COVID-19, you should stay away from other people. This is called self-isolation. The rules for ending self-isolation depend on your health in general.  If you are normally healthy  You can stop self-isolation when all 3 of these are true:    You ve had no fever--and no medicine that reduces fever--for 3 full days (72 hours). And     Your symptoms are better, such as cough or trouble breathing. And     At least 10 days have passed since your symptoms first started.  Talk with your care team before you leave home. They may tell you it s okay to leave, or they may give you different advice.  If you have a weak immune system  If you re being treated for cancer, have an immune disorder (such as HIV), or have had a transplant (organ or bone marrow), follow your care team s instructions. You may be able to end self-isolation when all 3 of these are true:    You ve had no fever--and no medicine that reduces fever--for 3 full days (72 hours). And     Your symptoms are better,  such as cough or trouble breathing. And     You ve had 2 tests for COVID-19. The tests happened at least 24 hours apart, and both show that you don t have the virus. (If no tests are available, your care team may tell you to follow the rules for normally healthy people above.)  When to call your care team  Call your care team right away if a sick person has any of these:    Trouble breathing    Pain or pressure in the chest  If a sick person has any of these, call 911:    Trouble breathing that gets worse    Pain or pressure in the chest that gets worse    Blue tint to lips or face    Fast or irregular heartbeat    Confusion or trouble waking    Fainting or loss of consciousness    Coughing up blood  Going home from the hospital  If you have COVID-19 and were recently in the hospital:    Follow the instructions above for self-care and isolation.    Follow the hospital care team s instructions.    Ask questions if anything is unclear to you. Write down answers so you remember them.  Last modified date: 5/8/2020  This information has been modified by your health care provider with permission from the publisher.      [id number] [facility], [address] [place] [address], [place] [address]. All rights reserved. This information is not intended as a substitute for professional medical care. Always follow your healthcare professional's instructions. This information has been modified by your health care provider with permission from the publisher.

## 2020-07-13 NOTE — TELEPHONE ENCOUNTER
PCP is not in clinic to review imaging ordered by Dr. Middleton (Dr. Middleton graduated residency and is no longer practicing in this clinic). Covering provider reviewed and provided the following comments:    I did review the CT and everything appears benign or unchanged from previous such as the hepatic cysts.  Importantly, there is no evidence of any recurrence of colon cancer.    I called Paul and reached his voicemail. Detailed message left regarding imaging and lab results. Lab results letter was also mailed in June.    Sweta Wick RN ([facility])

## 2020-07-20 DIAGNOSIS — R50.9 FEVER, UNSPECIFIED FEVER CAUSE: ICD-10-CM

## 2020-07-20 DIAGNOSIS — R09.89 CHEST CONGESTION: ICD-10-CM

## 2020-07-20 NOTE — LETTER
July 22, 2020        Paul Roland  [address]  [place] [address]    This letter provides a written record that you were tested for COVID-19 on 07/20/2020.       Your result was negative. This means that we didn t find the virus that causes COVID-19 in your sample. A test may show negative when you do actually have the virus. This can happen when the virus is in the early stages of infection, before you feel illness symptoms.    If you have symptoms   Stay home and away from others (self-isolate) until you meet ALL of the guidelines below:    You ve had no fever--and no medicine that reduces fever--for 3 full days (72 hours). And      Your other symptoms have gotten better. For example, your cough or breathing has improved. And     At least 10 days have passed since your symptoms started.    During this time:    Stay home. Don t go to work, school or anywhere else.     Stay in your own room, including for meals. Use your own bathroom if you can.    Stay away from others in your home. No hugging, kissing or shaking hands. No visitors.    Clean  high touch  surfaces often (doorknobs, counters, handles, etc.). Use a household cleaning spray or wipes. You can find a full list on the EPA website at www.epa.gov/pesticide-registration/list-n-disinfectants-use-against-sars-cov-2.    Cover your mouth and nose with a mask, tissue or washcloth to avoid spreading germs.    Wash your hands and face often with soap and water.    Going back to work  Check with your employer for any guidelines to follow for going back to work.    Employers: This document serves as formal notice that your employee tested negative for COVID-19, as of the testing date shown above.

## 2020-07-21 LAB
SARS-COV-2 RNA SPEC QL NAA+PROBE: NOT DETECTED
SPECIMEN SOURCE: NORMAL

## 2020-08-19 ENCOUNTER — TELEPHONE (OUTPATIENT)
Dept: INTERNAL MEDICINE | Facility: CLINIC | Age: 65
End: 2020-08-19

## 2020-08-19 DIAGNOSIS — M54.12 CERVICAL RADICULAR PAIN: ICD-10-CM

## 2020-08-19 RX ORDER — DICLOFENAC SODIUM 75 MG/1
TABLET, DELAYED RELEASE ORAL
Qty: 40 TABLET | Refills: 0 | Status: SHIPPED | OUTPATIENT
Start: 2020-08-19 | End: 2020-10-16

## 2020-08-19 NOTE — TELEPHONE ENCOUNTER
Letter was printed and placed down on first floor lock box for patient to . Marylou White LPN 8/19/2020 11:50 AM

## 2020-08-19 NOTE — TELEPHONE ENCOUNTER
Called patient and informed him his letter request is ready for  on the first floor lock box.       Wolf Lara CMA ([facility]) at 11:56 AM on 8/19/2020

## 2020-08-19 NOTE — TELEPHONE ENCOUNTER
[facility] Call Center    Phone Message    May a detailed message be left on voicemail: yes     Reason for Call: Form or Letter   Type or form/letter needing completion: Extend Maury. Patient states he did get a COVID test done and it came back negative, but patient feels apprehensive about going back to work because he is in a high risk group due to his age and cancer history. Patient would like a letter to extend his pasqualeugh.   Provider: Dr. Kimball  Date form needed: ASAP  Once completed: Patient will  at .      Action Taken: Message routed to:  [facility]    Travel Screening: Not Applicable

## 2020-08-19 NOTE — LETTER
Paul Roland  [address]  [place] [address]  : 1955          Amanda Roland is a patient of mine. Due to the current pandemic, we encourage social distancing and avoiding large crowds. Working from home is encouraged when possible, particularly for patients greater than age 60 and those with underlying health conditions, such as Paul Roland, who are at greater risk of having serious illness. Please extend his furlough if possible.         Sincerely,    Delano Kimball MD

## 2020-10-15 DIAGNOSIS — M54.12 CERVICAL RADICULAR PAIN: ICD-10-CM

## 2020-10-16 RX ORDER — DICLOFENAC SODIUM 75 MG/1
TABLET, DELAYED RELEASE ORAL
Qty: 40 TABLET | Refills: 0 | Status: SHIPPED | OUTPATIENT
Start: 2020-10-16 | End: 2020-12-16

## 2020-11-16 ENCOUNTER — TELEPHONE (OUTPATIENT)
Dept: SURGERY | Facility: CLINIC | Age: 65
End: 2020-11-16

## 2020-11-16 DIAGNOSIS — Z11.59 ENCOUNTER FOR SCREENING FOR OTHER VIRAL DISEASES: Primary | ICD-10-CM

## 2020-11-16 DIAGNOSIS — Z12.11 SPECIAL SCREENING FOR MALIGNANT NEOPLASMS, COLON: Primary | ICD-10-CM

## 2020-11-16 DIAGNOSIS — C18.7 CANCER OF SIGMOID COLON (H): ICD-10-CM

## 2020-11-16 NOTE — TELEPHONE ENCOUNTER
[facility] Call Center    Phone Message    May a detailed message be left on voicemail: yes     Reason for Call: Other:      Paul is trying to schedule a colonoscopy but his last order  in September.   Please put in new order and call him so he knows that is completed.         Action Taken: Message routed to:  [facility] Colon and Rectal Surgery    Travel Screening: Not Applicable

## 2020-12-07 ENCOUNTER — TELEPHONE (OUTPATIENT)
Dept: GASTROENTEROLOGY | Facility: OUTPATIENT CENTER | Age: 65
End: 2020-12-07

## 2020-12-08 DIAGNOSIS — Z11.59 ENCOUNTER FOR SCREENING FOR OTHER VIRAL DISEASES: ICD-10-CM

## 2020-12-08 LAB
SARS-COV-2 RNA SPEC QL NAA+PROBE: NOT DETECTED
SPECIMEN SOURCE: NORMAL

## 2020-12-08 PROCEDURE — 99000 SPECIMEN HANDLING OFFICE-LAB: CPT | Performed by: PATHOLOGY

## 2020-12-08 PROCEDURE — U0003 INFECTIOUS AGENT DETECTION BY NUCLEIC ACID (DNA OR RNA); SEVERE ACUTE RESPIRATORY SYNDROME CORONAVIRUS 2 (SARS-COV-2) (CORONAVIRUS DISEASE [COVID-19]), AMPLIFIED PROBE TECHNIQUE, MAKING USE OF HIGH THROUGHPUT TECHNOLOGIES AS DESCRIBED BY CMS-2020-01-R: HCPCS | Mod: 90 | Performed by: PATHOLOGY

## 2020-12-11 ENCOUNTER — DOCUMENTATION ONLY (OUTPATIENT)
Dept: GASTROENTEROLOGY | Facility: OUTPATIENT CENTER | Age: 65
End: 2020-12-11
Attending: COLON & RECTAL SURGERY
Payer: COMMERCIAL

## 2020-12-11 ENCOUNTER — TRANSFERRED RECORDS (OUTPATIENT)
Dept: HEALTH INFORMATION MANAGEMENT | Facility: CLINIC | Age: 65
End: 2020-12-11

## 2020-12-11 DIAGNOSIS — C18.7 CANCER OF SIGMOID COLON (H): ICD-10-CM

## 2020-12-14 LAB — COPATH REPORT: NORMAL

## 2020-12-16 ENCOUNTER — TELEPHONE (OUTPATIENT)
Dept: UROLOGY | Facility: CLINIC | Age: 65
End: 2020-12-16

## 2020-12-16 DIAGNOSIS — M54.12 CERVICAL RADICULAR PAIN: ICD-10-CM

## 2020-12-16 RX ORDER — DICLOFENAC SODIUM 75 MG/1
TABLET, DELAYED RELEASE ORAL
Qty: 40 TABLET | Refills: 0 | Status: SHIPPED | OUTPATIENT
Start: 2020-12-16 | End: 2021-02-22

## 2020-12-16 NOTE — TELEPHONE ENCOUNTER
Refilled trimix #4 one bottle on month for this patient thru our compound pharmacy and told him he needs to keep his appointment next month with marline Coker LPN Staff Nurse

## 2020-12-16 NOTE — TELEPHONE ENCOUNTER
[facility] Call Center    Phone Message    May a detailed message be left on voicemail: yes     Reason for Call: Medication Refill Request    Has the patient contacted the pharmacy for the refill? Yes   Name of medication being requested: Trimix   Provider who prescribed the medication:   Pharmacy:  compounding pharmacy  Date medication is needed: asap         Action Taken: Message routed to:  [facility] uro    Travel Screening: Not Applicable

## 2020-12-21 ENCOUNTER — VIRTUAL VISIT (OUTPATIENT)
Dept: ORTHOPEDICS | Facility: CLINIC | Age: 65
End: 2020-12-21
Payer: COMMERCIAL

## 2020-12-21 DIAGNOSIS — M62.830 LUMBAR PARASPINAL MUSCLE SPASM: ICD-10-CM

## 2020-12-21 DIAGNOSIS — M50.30 DDD (DEGENERATIVE DISC DISEASE), CERVICAL: ICD-10-CM

## 2020-12-21 DIAGNOSIS — M54.12 CERVICAL RADICULAR PAIN: Primary | ICD-10-CM

## 2020-12-21 PROCEDURE — 99442 PR PHYSICIAN TELEPHONE EVALUATION 11-20 MIN: CPT | Mod: 95 | Performed by: PREVENTIVE MEDICINE

## 2020-12-21 NOTE — LETTER
"    12/21/2020         RE: Paul Roland  [address]  [facility] [address]  [place] [address]        Dear Colleague,    Thank you for referring your patient, Paul Roland, to the [facility] [place]. Please see a copy of my visit note below.    Paul Roland is a 65 year old male who is being evaluated via a billable telephone visit.      The patient has been notified of following:     \"This telephone visit will be conducted via a call between you and your physician/provider. We have found that certain health care needs can be provided without the need for a physical exam.  This service lets us provide the care you need with a short phone conversation.  If a prescription is necessary we can send it directly to your pharmacy.  If lab work is needed we can place an order for that and you can then stop by our lab to have the test done at a later time.    Telephone visits are billed at different rates depending on your insurance coverage. During this emergency period, for some insurers they may be billed the same as an in-person visit.  Please reach out to your insurance provider with any questions.    If during the course of the call the physician/provider feels a telephone visit is not appropriate, you will not be charged for this service.\"    Patient has given verbal consent for Telephone visit?  Yes    What phone number would you like to be contacted at? cell    How would you like to obtain your AVS? MyChart  HISTORY OF PRESENT ILLNESS  Mr. Roland is a pleasant 65 year old year old male who presents  To followup for neck and low back  Paul explains that his neck has felt much better since last spring when he had some PT exercises and utilized voltaren PRN  His low back has been bothering him more recently, no injury, but voltaren PRN helps  Location: neck and low back  Quality:  achy pain    Severity: 4/10 at worst    Duration: past 6 months  Timing: occurs " intermittently  Context: occurs while exercising and lifting  Modifying factors:  resting and non-use makes it better, movement and use makes it worse  Associated signs & symptoms: low back pain    MEDICAL HISTORY  Patient Active Problem List   Diagnosis     Hyperlipidemia     ED (erectile dysfunction)       Current Outpatient Medications   Medication Sig Dispense Refill     atorvastatin (LIPITOR) 20 MG tablet Take 1 tablet (20 mg) by mouth daily 100 tablet 3     diclofenac (VOLTAREN) 75 MG EC tablet TAKE 1 TABLET BY MOUTH TWICE A DAY 40 tablet 0     ibuprofen (ADVIL/MOTRIN) 200 MG tablet        meloxicam (MOBIC) 15 MG tablet Take 1 tablet (15 mg) by mouth daily 30 tablet 3     methylPREDNISolone (MEDROL DOSEPAK) 4 MG tablet therapy pack Follow Package Directions 21 tablet 0     methylPREDNISolone (MEDROL DOSEPAK) 4 MG tablet therapy pack Follow Package Directions 21 tablet 0     NEW MED FV Trimix #4:  Trimix intracavernosal injection, per mL:  PGE1: 40 mcg  Papaverine: 27.6mg  Phentolamine: 1 mg    Sig: Inject 0.1mL intracavernosal as directed.    Max use one daily and up to 3x/week.  May titrate up in 0.1mL increments as needed.  Use lowest effective dose. 5 mL prn     omeprazole (PRILOSEC OTC) 20 MG EC tablet Take 1 tablet (20 mg) by mouth daily (Patient not taking: Reported on 6/12/2019) 30 tablet 3     tiZANidine (ZANAFLEX) 4 MG tablet Take 1-2 tablets (4-8 mg) by mouth nightly as needed for muscle spasms 30 tablet 1       No Known Allergies    Family History   Problem Relation Age of Onset     Liver Cancer Mother      Social History     Socioeconomic History     Marital status: Single     Spouse name: Not on file     Number of children: Not on file     Years of education: Not on file     Highest education level: Not on file   Occupational History     Not on file   Social Needs     Financial resource strain: Not on file     Food insecurity     Worry: Not on file     Inability: Not on file     Transportation  needs     Medical: Not on file     Non-medical: Not on file   Tobacco Use     Smoking status: Current Every Day Smoker     Smokeless tobacco: Never Used   Substance and Sexual Activity     Alcohol use: Yes     Frequency: 2-4 times a month     Drug use: Never     Sexual activity: Yes   Lifestyle     Physical activity     Days per week: Not on file     Minutes per session: Not on file     Stress: Not on file   Relationships     Social connections     Talks on phone: Not on file     Gets together: Not on file     Attends Church service: Not on file     Active member of club or organization: Not on file     Attends meetings of clubs or organizations: Not on file     Relationship status: Not on file     Intimate partner violence     Fear of current or ex partner: Not on file     Emotionally abused: Not on file     Physically abused: Not on file     Forced sexual activity: Not on file   Other Topics Concern     Parent/sibling w/ CABG, MI or angioplasty before 65F 55M? Not Asked   Social History Narrative     Not on file       Additional medical/Social/Surgical histories reviewed in [facility] and updated as appropriate.     REVIEW OF SYSTEMS (12/21/2020)  10 point ROS of systems including Constitutional, Eyes, Respiratory, Cardiovascular, Gastroenterology, Genitourinary, Integumentary, Musculoskeletal, Psychiatric, Allergic/Immunologic were all negative except for pertinent positives noted in my HPI.       ASSESSMENT & PLAN  64 yo male with cervical ddd, stable, and lumbar pain due to ddd  Reviewed a plan to have him come in in 2 months for lumbar xrays if continues to bother him  Refilled voltaren PRN  Discussed nsaid use  Using occasionally  F/u 2 months  Sterling Escalona MD, CAQSM    Phone call duration: 14 minutes  Phone call start: 10:30am  Phone call end: 10:44am  Sterling Escalona MD        Again, thank you for allowing me to participate in the care of your patient.        Sincerely,        Sterling Escalona,  MD

## 2020-12-21 NOTE — LETTER
"    12/21/2020         RE: Paul Roland  [address]  [facility] [address]  [place] [address]        Dear Colleague,    Thank you for referring your patient, Paul Roland, to the [facility] [place]. Please see a copy of my visit note below.    Paul Roland is a 65 year old male who is being evaluated via a billable telephone visit.      The patient has been notified of following:     \"This telephone visit will be conducted via a call between you and your physician/provider. We have found that certain health care needs can be provided without the need for a physical exam.  This service lets us provide the care you need with a short phone conversation.  If a prescription is necessary we can send it directly to your pharmacy.  If lab work is needed we can place an order for that and you can then stop by our lab to have the test done at a later time.    Telephone visits are billed at different rates depending on your insurance coverage. During this emergency period, for some insurers they may be billed the same as an in-person visit.  Please reach out to your insurance provider with any questions.    If during the course of the call the physician/provider feels a telephone visit is not appropriate, you will not be charged for this service.\"    Patient has given verbal consent for Telephone visit?  Yes    What phone number would you like to be contacted at? cell    How would you like to obtain your AVS? MyChart  HISTORY OF PRESENT ILLNESS  Mr. Roland is a pleasant 65 year old year old male who presents  To followup for neck and low back  Paul explains that his neck has felt much better since last spring when he had some PT exercises and utilized voltaren PRN  His low back has been bothering him more recently, no injury, but voltaren PRN helps  Location: neck and low back  Quality:  achy pain    Severity: 4/10 at worst    Duration: past 6 months  Timing: occurs " intermittently  Context: occurs while exercising and lifting  Modifying factors:  resting and non-use makes it better, movement and use makes it worse  Associated signs & symptoms: low back pain    MEDICAL HISTORY  Patient Active Problem List   Diagnosis     Hyperlipidemia     ED (erectile dysfunction)       Current Outpatient Medications   Medication Sig Dispense Refill     atorvastatin (LIPITOR) 20 MG tablet Take 1 tablet (20 mg) by mouth daily 100 tablet 3     diclofenac (VOLTAREN) 75 MG EC tablet TAKE 1 TABLET BY MOUTH TWICE A DAY 40 tablet 0     ibuprofen (ADVIL/MOTRIN) 200 MG tablet        meloxicam (MOBIC) 15 MG tablet Take 1 tablet (15 mg) by mouth daily 30 tablet 3     methylPREDNISolone (MEDROL DOSEPAK) 4 MG tablet therapy pack Follow Package Directions 21 tablet 0     methylPREDNISolone (MEDROL DOSEPAK) 4 MG tablet therapy pack Follow Package Directions 21 tablet 0     NEW MED FV Trimix #4:  Trimix intracavernosal injection, per mL:  PGE1: 40 mcg  Papaverine: 27.6mg  Phentolamine: 1 mg    Sig: Inject 0.1mL intracavernosal as directed.    Max use one daily and up to 3x/week.  May titrate up in 0.1mL increments as needed.  Use lowest effective dose. 5 mL prn     omeprazole (PRILOSEC OTC) 20 MG EC tablet Take 1 tablet (20 mg) by mouth daily (Patient not taking: Reported on 6/12/2019) 30 tablet 3     tiZANidine (ZANAFLEX) 4 MG tablet Take 1-2 tablets (4-8 mg) by mouth nightly as needed for muscle spasms 30 tablet 1       No Known Allergies    Family History   Problem Relation Age of Onset     Liver Cancer Mother      Social History     Socioeconomic History     Marital status: Single     Spouse name: Not on file     Number of children: Not on file     Years of education: Not on file     Highest education level: Not on file   Occupational History     Not on file   Social Needs     Financial resource strain: Not on file     Food insecurity     Worry: Not on file     Inability: Not on file     Transportation  needs     Medical: Not on file     Non-medical: Not on file   Tobacco Use     Smoking status: Current Every Day Smoker     Smokeless tobacco: Never Used   Substance and Sexual Activity     Alcohol use: Yes     Frequency: 2-4 times a month     Drug use: Never     Sexual activity: Yes   Lifestyle     Physical activity     Days per week: Not on file     Minutes per session: Not on file     Stress: Not on file   Relationships     Social connections     Talks on phone: Not on file     Gets together: Not on file     Attends Denominational service: Not on file     Active member of club or organization: Not on file     Attends meetings of clubs or organizations: Not on file     Relationship status: Not on file     Intimate partner violence     Fear of current or ex partner: Not on file     Emotionally abused: Not on file     Physically abused: Not on file     Forced sexual activity: Not on file   Other Topics Concern     Parent/sibling w/ CABG, MI or angioplasty before 65F 55M? Not Asked   Social History Narrative     Not on file       Additional medical/Social/Surgical histories reviewed in [facility] and updated as appropriate.     REVIEW OF SYSTEMS (12/21/2020)  10 point ROS of systems including Constitutional, Eyes, Respiratory, Cardiovascular, Gastroenterology, Genitourinary, Integumentary, Musculoskeletal, Psychiatric, Allergic/Immunologic were all negative except for pertinent positives noted in my HPI.       ASSESSMENT & PLAN  66 yo male with cervical ddd, stable, and lumbar pain due to ddd  Reviewed a plan to have him come in in 2 months for lumbar xrays if continues to bother him  Refilled voltaren PRN  Discussed nsaid use  Using occasionally  F/u 2 months  Sterling Escalona MD, CAQSM    Phone call duration: 14 minutes  Phone call start: 10:30am  Phone call end: 10:44am  Sterling Escalona MD        Again, thank you for allowing me to participate in the care of your patient.        Sincerely,        Sterling Escalona,  MD

## 2020-12-21 NOTE — PROGRESS NOTES
"Paul Roland is a 65 year old male who is being evaluated via a billable telephone visit.      The patient has been notified of following:     \"This telephone visit will be conducted via a call between you and your physician/provider. We have found that certain health care needs can be provided without the need for a physical exam.  This service lets us provide the care you need with a short phone conversation.  If a prescription is necessary we can send it directly to your pharmacy.  If lab work is needed we can place an order for that and you can then stop by our lab to have the test done at a later time.    Telephone visits are billed at different rates depending on your insurance coverage. During this emergency period, for some insurers they may be billed the same as an in-person visit.  Please reach out to your insurance provider with any questions.    If during the course of the call the physician/provider feels a telephone visit is not appropriate, you will not be charged for this service.\"    Patient has given verbal consent for Telephone visit?  Yes    What phone number would you like to be contacted at? cell    How would you like to obtain your AVS? MyChart  HISTORY OF PRESENT ILLNESS  Mr. Roland is a pleasant 65 year old year old male who presents  To followup for neck and low back  Paul explains that his neck has felt much better since last spring when he had some PT exercises and utilized voltaren PRN  His low back has been bothering him more recently, no injury, but voltaren PRN helps  Location: neck and low back  Quality:  achy pain    Severity: 4/10 at worst    Duration: past 6 months  Timing: occurs intermittently  Context: occurs while exercising and lifting  Modifying factors:  resting and non-use makes it better, movement and use makes it worse  Associated signs & symptoms: low back pain    MEDICAL HISTORY  Patient Active Problem List   Diagnosis     Hyperlipidemia     ED (erectile " dysfunction)       Current Outpatient Medications   Medication Sig Dispense Refill     atorvastatin (LIPITOR) 20 MG tablet Take 1 tablet (20 mg) by mouth daily 100 tablet 3     diclofenac (VOLTAREN) 75 MG EC tablet TAKE 1 TABLET BY MOUTH TWICE A DAY 40 tablet 0     ibuprofen (ADVIL/MOTRIN) 200 MG tablet        meloxicam (MOBIC) 15 MG tablet Take 1 tablet (15 mg) by mouth daily 30 tablet 3     methylPREDNISolone (MEDROL DOSEPAK) 4 MG tablet therapy pack Follow Package Directions 21 tablet 0     methylPREDNISolone (MEDROL DOSEPAK) 4 MG tablet therapy pack Follow Package Directions 21 tablet 0     NEW MED FV Trimix #4:  Trimix intracavernosal injection, per mL:  PGE1: 40 mcg  Papaverine: 27.6mg  Phentolamine: 1 mg    Sig: Inject 0.1mL intracavernosal as directed.    Max use one daily and up to 3x/week.  May titrate up in 0.1mL increments as needed.  Use lowest effective dose. 5 mL prn     omeprazole (PRILOSEC OTC) 20 MG EC tablet Take 1 tablet (20 mg) by mouth daily (Patient not taking: Reported on 6/12/2019) 30 tablet 3     tiZANidine (ZANAFLEX) 4 MG tablet Take 1-2 tablets (4-8 mg) by mouth nightly as needed for muscle spasms 30 tablet 1       No Known Allergies    Family History   Problem Relation Age of Onset     Liver Cancer Mother      Social History     Socioeconomic History     Marital status: Single     Spouse name: Not on file     Number of children: Not on file     Years of education: Not on file     Highest education level: Not on file   Occupational History     Not on file   Social Needs     Financial resource strain: Not on file     Food insecurity     Worry: Not on file     Inability: Not on file     Transportation needs     Medical: Not on file     Non-medical: Not on file   Tobacco Use     Smoking status: Current Every Day Smoker     Smokeless tobacco: Never Used   Substance and Sexual Activity     Alcohol use: Yes     Frequency: 2-4 times a month     Drug use: Never     Sexual activity: Yes    Lifestyle     Physical activity     Days per week: Not on file     Minutes per session: Not on file     Stress: Not on file   Relationships     Social connections     Talks on phone: Not on file     Gets together: Not on file     Attends Worship service: Not on file     Active member of club or organization: Not on file     Attends meetings of clubs or organizations: Not on file     Relationship status: Not on file     Intimate partner violence     Fear of current or ex partner: Not on file     Emotionally abused: Not on file     Physically abused: Not on file     Forced sexual activity: Not on file   Other Topics Concern     Parent/sibling w/ CABG, MI or angioplasty before 65F 55M? Not Asked   Social History Narrative     Not on file       Additional medical/Social/Surgical histories reviewed in [facility] and updated as appropriate.     REVIEW OF SYSTEMS (12/21/2020)  10 point ROS of systems including Constitutional, Eyes, Respiratory, Cardiovascular, Gastroenterology, Genitourinary, Integumentary, Musculoskeletal, Psychiatric, Allergic/Immunologic were all negative except for pertinent positives noted in my HPI.       ASSESSMENT & PLAN  66 yo male with cervical ddd, stable, and lumbar pain due to ddd  Reviewed a plan to have him come in in 2 months for lumbar xrays if continues to bother him  Refilled voltaren PRN  Discussed nsaid use  Using occasionally  F/u 2 months  Sterling Escalona MD, CAQSM    Phone call duration: 14 minutes  Phone call start: 10:30am  Phone call end: 10:44am  Sterling Escalona MD

## 2021-01-15 ENCOUNTER — HEALTH MAINTENANCE LETTER (OUTPATIENT)
Age: 66
End: 2021-01-15

## 2021-01-22 ENCOUNTER — PRE VISIT (OUTPATIENT)
Dept: UROLOGY | Facility: CLINIC | Age: 66
End: 2021-01-22

## 2021-01-22 NOTE — TELEPHONE ENCOUNTER
Reason for Visit: Annual    Diagnosis: Erectile Dysfunction    Orders/Procedures/Records: in system    Contact Patient: n/a    Rooming Requirements: normal      Smiley Eaton LPN  01/22/21  6:42 AM

## 2021-01-28 ENCOUNTER — OFFICE VISIT (OUTPATIENT)
Dept: UROLOGY | Facility: CLINIC | Age: 66
End: 2021-01-28
Payer: COMMERCIAL

## 2021-01-28 VITALS — WEIGHT: 200 LBS | BODY MASS INDEX: 27.09 KG/M2 | HEIGHT: 72 IN

## 2021-01-28 DIAGNOSIS — N52.9 ERECTILE DYSFUNCTION, UNSPECIFIED ERECTILE DYSFUNCTION TYPE: ICD-10-CM

## 2021-01-28 PROCEDURE — 99212 OFFICE O/P EST SF 10 MIN: CPT | Performed by: UROLOGY

## 2021-01-28 RX ORDER — IBUPROFEN 200 MG
TABLET ORAL
Qty: 30 EACH | Status: SHIPPED | OUTPATIENT
Start: 2021-01-28 | End: 2023-10-18

## 2021-01-28 ASSESSMENT — PAIN SCALES - GENERAL: PAINLEVEL: NO PAIN (0)

## 2021-01-28 ASSESSMENT — MIFFLIN-ST. JEOR: SCORE: 1720.19

## 2021-01-28 NOTE — NURSING NOTE
Chief Complaint   Patient presents with     Follow Up     annual       Height 1.829 m (6'), weight 90.7 kg (200 lb). Body mass index is 27.12 kg/m .    Patient Active Problem List   Diagnosis     Hyperlipidemia     ED (erectile dysfunction)       No Known Allergies    Current Outpatient Medications   Medication Sig Dispense Refill     atorvastatin (LIPITOR) 20 MG tablet Take 1 tablet (20 mg) by mouth daily 100 tablet 3     diclofenac (VOLTAREN) 75 MG EC tablet TAKE 1 TABLET BY MOUTH TWICE A DAY 40 tablet 0     ibuprofen (ADVIL/MOTRIN) 200 MG tablet        meloxicam (MOBIC) 15 MG tablet Take 1 tablet (15 mg) by mouth daily 30 tablet 3     methylPREDNISolone (MEDROL DOSEPAK) 4 MG tablet therapy pack Follow Package Directions 21 tablet 0     methylPREDNISolone (MEDROL DOSEPAK) 4 MG tablet therapy pack Follow Package Directions 21 tablet 0     NEW MED FV Trimix #4:  Trimix intracavernosal injection, per mL:  PGE1: 40 mcg  Papaverine: 27.6mg  Phentolamine: 1 mg    Sig: Inject 0.1mL intracavernosal as directed.    Max use one daily and up to 3x/week.  May titrate up in 0.1mL increments as needed.  Use lowest effective dose. 5 mL prn     omeprazole (PRILOSEC OTC) 20 MG EC tablet Take 1 tablet (20 mg) by mouth daily (Patient not taking: Reported on 6/12/2019) 30 tablet 3     tiZANidine (ZANAFLEX) 4 MG tablet Take 1-2 tablets (4-8 mg) by mouth nightly as needed for muscle spasms 30 tablet 1       Social History     Tobacco Use     Smoking status: Current Every Day Smoker     Smokeless tobacco: Never Used   Substance Use Topics     Alcohol use: Yes     Frequency: 2-4 times a month     Drug use: Never       Smiley Eaton LPN  1/28/2021  11:17 AM

## 2021-01-28 NOTE — PROGRESS NOTES
"        Assessment & Plan     Erectile dysfunction, unspecified erectile dysfunction type    - NEW MED; FV Trimix #4:  Trimix intracavernosal injection, per mL:  PGE1: 40 mcg  Papaverine: 27.6mg  Phentolamine: 1 mg    Sig: Inject 0.5mL intracavernosal as directed.    Max use one daily and up to 3x/week.  May titrate up in 0.1mL increments as needed.  Use lowest effective dose.  - insulin syringe-needle U-100 (BD INSULIN SYRINGE) 29G X 1/2\" 1 ML miscellaneous; Use as directed    12 minutes spent on the date of the encounter doing chart review, history and exam, documentation and further activities as noted above        Follow-up in 1 year as needed    Eliceo Damon MD  [facility] [place]    Tesfaye Miller is a 65 year old who presents to clinic today for the following health issues     HPI     Paul Roland is a 65 year old male here to follow-up ED.  ED symptoms started after surgery for colorectal surgery.    He uses Trimix #4.  This works so-so for him at 0.4-0.5ml dosage.  Discussed he can increase the dose some or add a constriction ring.  IPP would be another option.  I do not think he will have a spontaneous return of sexual function at this point, he is now about 3 years from his pelvic surgery.      Review of Systems   CONSTITUTIONAL: NEGATIVE for fever, chills, change in weight  ENT/MOUTH: NEGATIVE for ear, mouth and throat problems  RESP: NEGATIVE for significant cough or SOB  CV: NEGATIVE for chest pain, palpitations or peripheral edema    Objective      Ht 1.829 m (6')   Wt 90.7 kg (200 lb)   BMI 27.12 kg/m      General: Alert, oriented, nad.  Pleasant and conversant.  Eyes: anicteric, EOMI.  Pulse: regular  Resps: normal, non-labored.  Abdomen:  Nondistended.  Neurological - no tremors  Skin - no discoloration/ lesions noted   exam deferred.                   "

## 2021-01-28 NOTE — LETTER
"1/28/2021       RE: Paul Roland  [address]  [place] [address]     Dear Colleague,    Thank you for referring your patient, Paul Roland, to the [facility] [place] at [facility]. Please see a copy of my visit note below.            Assessment & Plan     Erectile dysfunction, unspecified erectile dysfunction type    - NEW MED; FV Trimix #4:  Trimix intracavernosal injection, per mL:  PGE1: 40 mcg  Papaverine: 27.6mg  Phentolamine: 1 mg    Sig: Inject 0.5mL intracavernosal as directed.    Max use one daily and up to 3x/week.  May titrate up in 0.1mL increments as needed.  Use lowest effective dose.  - insulin syringe-needle U-100 (BD INSULIN SYRINGE) 29G X 1/2\" 1 ML miscellaneous; Use as directed    12 minutes spent on the date of the encounter doing chart review, history and exam, documentation and further activities as noted above        Follow-up in 1 year as needed    Eliceo Damon MD  [facility] [place]    Tesfaye Miller is a 65 year old who presents to clinic today for the following health issues     HPI     Paul Roland is a 65 year old male here to follow-up ED.  ED symptoms started after surgery for colorectal surgery.    He uses Trimix #4.  This works so-so for him at 0.4-0.5ml dosage.  Discussed he can increase the dose some or add a constriction ring.  IPP would be another option.  I do not think he will have a spontaneous return of sexual function at this point, he is now about 3 years from his pelvic surgery.      Review of Systems   CONSTITUTIONAL: NEGATIVE for fever, chills, change in weight  ENT/MOUTH: NEGATIVE for ear, mouth and throat problems  RESP: NEGATIVE for significant cough or SOB  CV: NEGATIVE for chest pain, palpitations or peripheral edema    Objective      Ht 1.829 m (6')   Wt 90.7 kg (200 lb)   BMI 27.12 kg/m      General: Alert, oriented, nad.  Pleasant and " conversant.  Eyes: anicteric, EOMI.  Pulse: regular  Resps: normal, non-labored.  Abdomen:  Nondistended.  Neurological - no tremors  Skin - no discoloration/ lesions noted   exam deferred.        Eliceo Damon MD

## 2021-02-21 DIAGNOSIS — M54.12 CERVICAL RADICULAR PAIN: ICD-10-CM

## 2021-02-22 RX ORDER — DICLOFENAC SODIUM 75 MG/1
TABLET, DELAYED RELEASE ORAL
Qty: 40 TABLET | Refills: 0 | Status: SHIPPED | OUTPATIENT
Start: 2021-02-22 | End: 2021-05-14

## 2021-04-07 ENCOUNTER — OFFICE VISIT (OUTPATIENT)
Dept: FAMILY MEDICINE | Facility: CLINIC | Age: 66
End: 2021-04-07
Payer: COMMERCIAL

## 2021-04-07 VITALS
OXYGEN SATURATION: 98 % | WEIGHT: 206 LBS | SYSTOLIC BLOOD PRESSURE: 120 MMHG | BODY MASS INDEX: 27.9 KG/M2 | HEART RATE: 68 BPM | RESPIRATION RATE: 16 BRPM | TEMPERATURE: 98 F | DIASTOLIC BLOOD PRESSURE: 82 MMHG | HEIGHT: 72 IN

## 2021-04-07 DIAGNOSIS — K59.00 CONSTIPATION, UNSPECIFIED CONSTIPATION TYPE: Primary | ICD-10-CM

## 2021-04-07 DIAGNOSIS — Z80.0 FAMILY HISTORY OF COLON CANCER: ICD-10-CM

## 2021-04-07 PROCEDURE — 99203 OFFICE O/P NEW LOW 30 MIN: CPT | Performed by: FAMILY MEDICINE

## 2021-04-07 ASSESSMENT — MIFFLIN-ST. JEOR: SCORE: 1742.41

## 2021-04-07 NOTE — PROGRESS NOTES
Assessment & Plan     Constipation, unspecified constipation type  Chronic. I do not think it is entirely related to his cancer surgery. We discussed adding metamucil regularly and miralax as needed. OK to do both on regular basis, if no improving - we will consider GI referral. His GI imaging were negative recently. Continue lifestyle changes.     Family history of colon cancer  GI image negative recently.      Davion Sanon MD, MD  [facility]    Tesfaye Miller is a 66 year old who presents for the following health issues     HPI     Constipation  Onset/Duration: 2-3 years   Description:  Frequency of bowel movements: 2-3 days soft   Consistency of stool: soft and small   Progression of Symptoms: same  Accompanying signs and symptoms:    Abdominal pain: no   Rectal pain: no   Blood in stool: no   Nausea/Vomiting: no   Weight loss or gain: no  History:   Similar problems in past: no  History of abdominal surgery: YES- had colon cancer and had surgery   Chronic laxative use: YES- every other day or every 2 days   New medications: no  Precipitating or alleviating factors: trying more fiber   Therapies tried and outcome: miralax     Got covid shot.     History of sigmoid cancer.   1 month ago  - colonoscopy. Next in 2 yrs.   Had iliostomy and it was removed.   Had CT scan last year which was fine.     Taking miralax every other day and waits and starts again. It works but without it does not do well.   No black stool. No blood in stool. No nausea or vomiting. Sometime heartburn. No history of kidney stone. Appetite is ok. No weight loss.   Water - drinking lots of water.     Still indigestion and feels slow.     Review of Systems         Objective    /82 (BP Location: Right arm, Patient Position: Sitting, Cuff Size: Adult Large)   Pulse 68   Temp 98  F (36.7  C) (Oral)   Resp 16   Ht 1.829 m (6')   Wt 93.4 kg (206 lb)   SpO2 98%   BMI 27.94 kg/m    Body mass  index is 27.94 kg/m .  Physical Exam   Abdomen soft. Healed surgical scars present. Bowel sounds slightly decreased.

## 2021-04-07 NOTE — PATIENT INSTRUCTIONS
Generic of metamucil per day. If not improving, OK to add miralax with it.         Patient Education     High-Fiber Diet  Fiber is in fruits, vegetables, cereals, and grains. Fiber passes through your body undigested. A high-fiber diet helps food move through your intestinal tract. The added bulk can help prevent constipation. In people with small pouches in the colon (diverticulosis), fiber helps clean out the pouches along the colon wall. It also prevents new pouches from forming. A high-fiber diet reduces the risk of colon cancer. It also lowers blood cholesterol and prevents high blood sugar in people with diabetes.     The high-fiber foods that are listed below should be part of your diet. If you are not used to eating high-fiber foods, start with 1 or 2 foods from this list. Every 3 to 4 days add a new food to your diet. Do this until you are eating 4 high-fiber foods per day. This should give you 20 to 35 grams of fiber a day. You need to drink a lot of water when you are on this diet. You should have 6 to 8 glasses of water a day. Water makes the fiber swell and increases the benefit.   Foods high in fiber  These foods are high in fiber:    Breads. Breads made with 100% whole-wheat flour; jevon, wheat, or rye crackers; whole-grain tortillas, bran muffins    Cereals. Whole-grain and bran cereals with bran (shredded wheat, wheat flakes, raisin bran, corn bran); oatmeal, rolled oats, granola, and brown rice    Fruits. Fresh fruits and their edible skins (pears, prunes, raisins, berries, apples, and apricots); bananas, citrus fruit, mangoes, pineapple; and prune juice    Nuts and seeds. Any nuts and seeds, such as walnuts, peanuts, almonds, and pumpkin seeds    Vegetables. This includes all vegetables. They are best served raw or lightly cooked. Those higher in fiber include green peas, celery, eggplant, potatoes, spinach, broccoli, Wallace sprouts, winter squash, carrots, cauliflower, soybeans, lentils, and  fresh and dried beans of all kinds.    Other. Popcorn; any spices  If you have diverticulosis  There aren't any specific foods to avoid if you have diverticulosis. But each person is different. There may be some foods that make your symptoms worse. Keep track and don t eat foods that make you feel worse.   Zainab last reviewed this educational content on 2/1/2020 2000-2020 [employer]. All rights reserved. This information is not intended as a substitute for professional medical care. Always follow your healthcare professional's instructions.

## 2021-05-13 DIAGNOSIS — M54.12 CERVICAL RADICULAR PAIN: ICD-10-CM

## 2021-05-14 RX ORDER — DICLOFENAC SODIUM 75 MG/1
TABLET, DELAYED RELEASE ORAL
Qty: 40 TABLET | Refills: 0 | Status: SHIPPED | OUTPATIENT
Start: 2021-05-14 | End: 2021-07-08

## 2021-05-27 ENCOUNTER — RECORDS - HEALTHEAST (OUTPATIENT)
Dept: ADMINISTRATIVE | Facility: CLINIC | Age: 66
End: 2021-05-27

## 2021-07-06 NOTE — TELEPHONE ENCOUNTER
DIAGNOSIS: R foot big toe pain *thinks it's possible Gaut or Arthritis* no new or recent injury , per pt , no images   APPOINTMENT DATE: 7.8.21   NOTES STATUS DETAILS   OFFICE NOTE from referring provider N/A    OFFICE NOTE from other specialist N/A    DISCHARGE SUMMARY from hospital N/A    DISCHARGE REPORT from the ER N/A    OPERATIVE REPORT N/A    EMG report N/A    MEDICATION LIST Internal    MRI N/A    DEXA (osteoporosis/bone health) N/A    CT SCAN N/A    XRAYS (IMAGES & REPORTS) N/A

## 2021-07-07 DIAGNOSIS — M79.671 RIGHT FOOT PAIN: Primary | ICD-10-CM

## 2021-07-08 ENCOUNTER — ANCILLARY PROCEDURE (OUTPATIENT)
Dept: GENERAL RADIOLOGY | Facility: CLINIC | Age: 66
End: 2021-07-08
Payer: COMMERCIAL

## 2021-07-08 ENCOUNTER — PRE VISIT (OUTPATIENT)
Dept: ORTHOPEDICS | Facility: CLINIC | Age: 66
End: 2021-07-08

## 2021-07-08 ENCOUNTER — OFFICE VISIT (OUTPATIENT)
Dept: ORTHOPEDICS | Facility: CLINIC | Age: 66
End: 2021-07-08
Payer: COMMERCIAL

## 2021-07-08 VITALS — BODY MASS INDEX: 27.77 KG/M2 | HEIGHT: 72 IN | WEIGHT: 205 LBS

## 2021-07-08 DIAGNOSIS — M79.671 RIGHT FOOT PAIN: ICD-10-CM

## 2021-07-08 DIAGNOSIS — M20.21 ACQUIRED HALLUX RIGIDUS, RIGHT: Primary | ICD-10-CM

## 2021-07-08 DIAGNOSIS — M54.12 CERVICAL RADICULAR PAIN: ICD-10-CM

## 2021-07-08 PROCEDURE — 99214 OFFICE O/P EST MOD 30 MIN: CPT | Performed by: FAMILY MEDICINE

## 2021-07-08 PROCEDURE — 73630 X-RAY EXAM OF FOOT: CPT | Mod: RT | Performed by: RADIOLOGY

## 2021-07-08 RX ORDER — DICLOFENAC SODIUM 75 MG/1
75 TABLET, DELAYED RELEASE ORAL 2 TIMES DAILY
Qty: 30 TABLET | Refills: 0 | Status: SHIPPED | OUTPATIENT
Start: 2021-07-08 | End: 2021-08-10

## 2021-07-08 ASSESSMENT — MIFFLIN-ST. JEOR: SCORE: 1737.87

## 2021-07-08 NOTE — PROGRESS NOTES
"CHIEF COMPLAINT:  Consult (Right foot pain)       HISTORY OF PRESENT ILLNESS  Mr. Roland is a pleasant 66 year old year old male who presents to clinic today with right foot pain.  Paul explains that he started to have great toe pain about 20 years ago after playing a game and getting stitches in his toe. The pain in his foot started getting worse in the last month.     Onset: worsening  Location: right foot  Quality:  sharp  Duration: 20 years   Severity: 9/10 at worst  Timing:worse since a month ago  Modifying factors:  resting and non-use makes it better, movement and use makes it worse  Associated signs & symptoms: pain and swelling and bruising  Previous similar pain: Longstanding pain  Treatments to date: Diclofenac, tylenol, ice    Additional history: as documented    Review of Systems:    Have you recently had a a fever, chills, weight loss? No    Do you have any vision problems? No    Do you have any chest pain or edema? No    Do you have any shortness of breath or wheezing?  No    Do you have stomach problems? No    Do you have any numbness or focal weakness? No    Do you have diabetes? No    Do you have problems with bleeding or clotting? No    Do you have an rashes or other skin lesions? No    MEDICAL HISTORY  Patient Active Problem List   Diagnosis     Hyperlipidemia     ED (erectile dysfunction)       Current Outpatient Medications   Medication Sig Dispense Refill     diclofenac (VOLTAREN) 75 MG EC tablet TAKE 1 TABLET BY MOUTH TWICE A DAY 40 tablet 0     ibuprofen (ADVIL/MOTRIN) 200 MG tablet        insulin syringe-needle U-100 (BD INSULIN SYRINGE) 29G X 1/2\" 1 ML miscellaneous Use as directed 30 each prn     NEW MED FV Trimix #4:  Trimix intracavernosal injection, per mL:  PGE1: 40 mcg  Papaverine: 27.6mg  Phentolamine: 1 mg    Sig: Inject 0.5mL intracavernosal as directed.    Max use one daily and up to 3x/week.  May titrate up in 0.1mL increments as needed.  Use lowest effective dose. 10 mL prn "     atorvastatin (LIPITOR) 20 MG tablet Take 1 tablet (20 mg) by mouth daily (Patient not taking: Reported on 4/7/2021) 100 tablet 3       No Known Allergies    Family History   Problem Relation Age of Onset     Liver Cancer Mother        Additional medical/Social/Surgical histories reviewed in [facility] and updated as appropriate.       PHYSICAL EXAM  Ht 1.829 m (6')   Wt 93 kg (205 lb)   BMI 27.80 kg/m      General  - normal appearance, in no obvious distress  HEENT  - conjunctivae not injected, moist mucous membranes  CV  - normal radial pulse  Pulm  - normal respiratory pattern, non-labored  Musculoskeletal - Right great toe  Right great toe with mild swelling and nodularity at the first MTP.  Palpation with circumferential tenderness.  Decreased range of motion in flexion-extension, no instability.  Neuro  - no numbness, no motor deficit, grossly normal coordination, normal muscle tone  Skin  - no ecchymosis, erythema, warmth, or induration, no obvious rash  Psych  - interactive, appropriate, normal mood and affect    IMAGING : X-ray right foot 3 view. Final results and radiologist's interpretation, available in the [facility] health record. Images were reviewed with the patient/family members in the office today. My personal interpretation of the performed imaging is severe osteoarthrosis of first MTP joint of right foot.  Large osteophyte formation.     ASSESSMENT & PLAN  Mr. Roland is a 66 year old year old male who presents to clinic today with ongoing chronic right first MTP joint pain.  X-rays consistent with severe osteoarthritis of the first MTP joint.    Diagnosis: Hallux rigidus of right foot    Treatment options discussed including oral analgesics, Bailee injection, orthotic modifications for first MTP joint osteoarthritis.  At this time he would like to proceed with corticosteroid ejection and given narrowing and joint space erosion, I would like to proceed with ultrasound guidance for his injection.   This will be scheduled next week.    In addition I did talk to him about a Schw[employer]'s carbon fiber insert for his shoe.  He can also start diclofenac which has been helpful in the past.  I did provide a refill for this today.    It was a pleasure seeing Paul today.    Sterling Dia DO, [facility]  Primary Care Sports Medicine

## 2021-07-08 NOTE — LETTER
"  7/8/2021      RE: Paul Roland  [address]  [address]  [place] [address]       CHIEF COMPLAINT:  Consult (Right foot pain)       HISTORY OF PRESENT ILLNESS  Mr. Roland is a pleasant 66 year old year old male who presents to clinic today with right foot pain.  Paul explains that he started to have great toe pain about 20 years ago after playing a game and getting stitches in his toe. The pain in his foot started getting worse in the last month.     Onset: worsening  Location: right foot  Quality:  sharp  Duration: 20 years   Severity: 9/10 at worst  Timing:worse since a month ago  Modifying factors:  resting and non-use makes it better, movement and use makes it worse  Associated signs & symptoms: pain and swelling and bruising  Previous similar pain: Longstanding pain  Treatments to date: Diclofenac, tylenol, ice    Additional history: as documented    Review of Systems:    Have you recently had a a fever, chills, weight loss? No    Do you have any vision problems? No    Do you have any chest pain or edema? No    Do you have any shortness of breath or wheezing?  No    Do you have stomach problems? No    Do you have any numbness or focal weakness? No    Do you have diabetes? No    Do you have problems with bleeding or clotting? No    Do you have an rashes or other skin lesions? No    MEDICAL HISTORY  Patient Active Problem List   Diagnosis     Hyperlipidemia     ED (erectile dysfunction)       Current Outpatient Medications   Medication Sig Dispense Refill     diclofenac (VOLTAREN) 75 MG EC tablet TAKE 1 TABLET BY MOUTH TWICE A DAY 40 tablet 0     ibuprofen (ADVIL/MOTRIN) 200 MG tablet        insulin syringe-needle U-100 (BD INSULIN SYRINGE) 29G X 1/2\" 1 ML miscellaneous Use as directed 30 each prn     NEW MED FV Trimix #4:  Trimix intracavernosal injection, per mL:  PGE1: 40 mcg  Papaverine: 27.6mg  Phentolamine: 1 mg    Sig: Inject 0.5mL intracavernosal as directed.    Max use one daily and up to " 3x/week.  May titrate up in 0.1mL increments as needed.  Use lowest effective dose. 10 mL prn     atorvastatin (LIPITOR) 20 MG tablet Take 1 tablet (20 mg) by mouth daily (Patient not taking: Reported on 4/7/2021) 100 tablet 3       No Known Allergies    Family History   Problem Relation Age of Onset     Liver Cancer Mother        Additional medical/Social/Surgical histories reviewed in [facility] and updated as appropriate.       PHYSICAL EXAM  Ht 1.829 m (6')   Wt 93 kg (205 lb)   BMI 27.80 kg/m      General  - normal appearance, in no obvious distress  HEENT  - conjunctivae not injected, moist mucous membranes  CV  - normal radial pulse  Pulm  - normal respiratory pattern, non-labored  Musculoskeletal - Right great toe  Right great toe with mild swelling and nodularity at the first MTP.  Palpation with circumferential tenderness.  Decreased range of motion in flexion-extension, no instability.  Neuro  - no numbness, no motor deficit, grossly normal coordination, normal muscle tone  Skin  - no ecchymosis, erythema, warmth, or induration, no obvious rash  Psych  - interactive, appropriate, normal mood and affect    IMAGING : X-ray right foot 3 view. Final results and radiologist's interpretation, available in the [facility] health record. Images were reviewed with the patient/family members in the office today. My personal interpretation of the performed imaging is severe osteoarthrosis of first MTP joint of right foot.  Large osteophyte formation.     ASSESSMENT & PLAN  Mr. Roland is a 66 year old year old male who presents to clinic today with ongoing chronic right first MTP joint pain.  X-rays consistent with severe osteoarthritis of the first MTP joint.    Diagnosis: Hallux rigidus of right foot    Treatment options discussed including oral analgesics, Bailee injection, orthotic modifications for first MTP joint osteoarthritis.  At this time he would like to proceed with corticosteroid ejection and given narrowing and  joint space erosion, I would like to proceed with ultrasound guidance for his injection.  This will be scheduled next week.    In addition I did talk to him about a Schwab's carbon fiber insert for his shoe.  He can also start diclofenac which has been helpful in the past.  I did provide a refill for this today.    It was a pleasure seeing Paul today.    Sterling Dia DO, [facility]  Primary Care Sports Medicine

## 2021-07-14 ENCOUNTER — OFFICE VISIT (OUTPATIENT)
Dept: ORTHOPEDICS | Facility: CLINIC | Age: 66
End: 2021-07-14
Payer: COMMERCIAL

## 2021-07-14 VITALS — WEIGHT: 205 LBS | BODY MASS INDEX: 27.77 KG/M2 | HEIGHT: 72 IN

## 2021-07-14 DIAGNOSIS — M20.21 ACQUIRED HALLUX RIGIDUS, RIGHT: Primary | ICD-10-CM

## 2021-07-14 PROCEDURE — 99207 PR DROP WITH A PROCEDURE: CPT | Performed by: FAMILY MEDICINE

## 2021-07-14 PROCEDURE — 20604 DRAIN/INJ JOINT/BURSA W/US: CPT | Mod: RT | Performed by: FAMILY MEDICINE

## 2021-07-14 RX ORDER — TRIAMCINOLONE ACETONIDE 40 MG/ML
40 INJECTION, SUSPENSION INTRA-ARTICULAR; INTRAMUSCULAR
Status: SHIPPED | OUTPATIENT
Start: 2021-07-14

## 2021-07-14 RX ORDER — DICLOFENAC SODIUM 75 MG/1
TABLET, DELAYED RELEASE ORAL
Qty: 40 TABLET | Refills: 0 | OUTPATIENT
Start: 2021-07-14

## 2021-07-14 RX ADMIN — TRIAMCINOLONE ACETONIDE 40 MG: 40 INJECTION, SUSPENSION INTRA-ARTICULAR; INTRAMUSCULAR at 09:31

## 2021-07-14 ASSESSMENT — MIFFLIN-ST. JEOR: SCORE: 1737.87

## 2021-07-14 NOTE — NURSING NOTE
[facility]   ORTHOPEDICS & SPORTS MEDICINE  [address] [place] [address]  Dept: [phone]  ______________________________________________________________________________    Patient: Paul Roland   : 1955   MRN: [id number]   2021    INVASIVE PROCEDURE SAFETY CHECKLIST    Date: 2021  Procedure: right great toe MTP joint cortisone injection   Patient Name: Paul Roland  MRN: [id number]  YOB: 1955    Action: Complete sections as appropriate. Any discrepancy results in a HARD COPY until resolved.     PRE PROCEDURE:  Patient ID verified with 2 identifiers (name and  or MRN): Yes  Procedure and site verified with patient/designee (when able): Yes  Accurate consent documentation in medical record: Yes  H&P (or appropriate assessment) documented in medical record: Yes  H&P must be up to 20 days prior to procedure and updates within 24 hours of procedure as applicable: Yes  Relevant diagnostic and radiology test results appropriately labeled and displayed as applicable: Yes  Procedure site(s) marked with provider initials: Yes    TIMEOUT:  Time-Out performed immediately prior to starting procedure, including verbal and active participation of all team members addressing the following:Yes  * Correct patient identify  * Confirmed that the correct side and site are marked  * An accurate procedure consent form  * Agreement on the procedure to be done  * Correct patient position  * Relevant images and results are properly labeled and appropriately displayed  * The need to administer antibiotics or fluids for irrigation purposes during the procedure as applicable   * Safety precautions based on patient history or medication use    DURING PROCEDURE: Verification of correct person, site, and procedures any time the responsibility for care of the patient is transferred to another member of the care team.       Prior to injection, verified patient identity using  patient's name and date of birth.  Due to injection administration, patient instructed to remain in clinic for 15 minutes  afterwards, and to report any adverse reaction to me immediately.    Joint injection was performed.      Drug Amount Wasted:  Yes: 4.5 mg/ml   Vial/Syringe: Multi dose vial  Expiration Date:  07/24      Michela Otoole ATC  July 14, 2021

## 2021-07-14 NOTE — PROGRESS NOTES
Procedure note for ESTABLISHED PATIENT FOLLOW-UP:  Procedure (right great toe, MTP joint cortisone injection )       HISTORY OF PRESENT ILLNESS  Mr. Roland is a pleasant 66 year old year old male who presents to clinic today for follow-up of right great toe MTP cortisone injection     Date last seen: 21  Following Therapeutic Plan: yes  Pain: unchanged   Function: unchanged     Additional medical/Social/Surgical histories reviewed in [facility] and updated as appropriate.    PROCEDURE ENCOUNTER    [facility]  Orthopedics  Sterling Dia DO  2021     Name: Paul Roland  MRN: [id number]  Age: 66 year old  : 1955    Referring provider:   Diagnosis: Hallux rigidus of right foot    First Metatarsophalangeal joint Injection- Ultrasound Guided    The patient was informed of the risks and the benefits of the procedure and a written consent was signed.    The patient s right great toe was prepped with chlorhexidine in sterile fashion.     20 mg of kenalog suspension was drawn up into a 3 mL syringe with 0.5 mL of 1% lidocaine.    Injection was performed using sterile technique.  Under ultrasound guidance a 1.5-inch 25-gauge needle was used to enter the 1st MTP joint of the right great toe.  Needle placement was visualized and documented with ultrasound.  Needle placed in short axis to the probe.  Ultrasound visualization was necessary due to decreased joint space in the setting of osteoarthritis.  Images were permanently stored for the patient's record. There were no complications. The patient tolerated the procedure well. There was negligible bleeding.    The patient was instructed to ice the toe upon leaving clinic and refrain from overuse over the next 3 days.     The patient was instructed to call or go to the emergency room with any unusual pain, swelling, redness, or if otherwise concerned.    Sterling Dia DO [facility]  Primary Care Sports Medicine  [facility]    Small Joint  Injection/Arthrocentesis: R great MTP    Date/Time: 7/14/2021 9:31 AM  Performed by: Sterling Dia DO  Authorized by: Sterling Dia DO   Indications:  Pain  Needle Size:  22 G  Guidance: ultrasound       Location:  Great toe    Site:  R great MTP                    Medications:  40 mg triamcinolone 40 MG/ML        Outcome:  Tolerated well, no immediate complications  Procedure discussed: discussed risks, benefits, and alternatives    Consent Given by:  Patient  Timeout: timeout called immediately prior to procedure    Prep: patient was prepped and draped in usual sterile fashion

## 2021-07-14 NOTE — LETTER
2021      RE: Paul Roland  [address]  [place] [address]       Procedure note for ESTABLISHED PATIENT FOLLOW-UP:  Procedure (right great toe, MTP joint cortisone injection )       HISTORY OF PRESENT ILLNESS  Mr. Roland is a pleasant 66 year old year old male who presents to clinic today for follow-up of right great toe MTP cortisone injection     Date last seen: 21  Following Therapeutic Plan: yes  Pain: unchanged   Function: unchanged     Additional medical/Social/Surgical histories reviewed in [facility] and updated as appropriate.    PROCEDURE ENCOUNTER    [facility]  Orthopedics  Sterling Dia, DO  2021     Name: Paul Roland  MRN: [id number]  Age: 66 year old  : 1955    Referring provider:   Diagnosis: Hallux rigidus of right foot    First Metatarsophalangeal joint Injection- Ultrasound Guided    The patient was informed of the risks and the benefits of the procedure and a written consent was signed.    The patient s right great toe was prepped with chlorhexidine in sterile fashion.     20 mg of kenalog suspension was drawn up into a 3 mL syringe with 0.5 mL of 1% lidocaine.    Injection was performed using sterile technique.  Under ultrasound guidance a 1.5-inch 25-gauge needle was used to enter the 1st MTP joint of the right great toe.  Needle placement was visualized and documented with ultrasound.  Needle placed in short axis to the probe.  Ultrasound visualization was necessary due to decreased joint space in the setting of osteoarthritis.  Images were permanently stored for the patient's record. There were no complications. The patient tolerated the procedure well. There was negligible bleeding.    The patient was instructed to ice the toe upon leaving clinic and refrain from overuse over the next 3 days.     The patient was instructed to call or go to the emergency room with any unusual pain, swelling, redness, or if otherwise concerned.    Sterling MCKEON  DO [facility]  Primary Care Sports Medicine  [facility]    Small Joint Injection/Arthrocentesis: R great MTP    Date/Time: 7/14/2021 9:31 AM  Performed by: Sterling Dia DO  Authorized by: Sterling Dia DO   Indications:  Pain  Needle Size:  22 G  Guidance: ultrasound       Location:  Great toe    Site:  R great MTP                    Medications:  40 mg triamcinolone 40 MG/ML        Outcome:  Tolerated well, no immediate complications  Procedure discussed: discussed risks, benefits, and alternatives    Consent Given by:  Patient  Timeout: timeout called immediately prior to procedure    Prep: patient was prepped and draped in usual sterile fashion          Sterling Dia DO

## 2021-07-20 ENCOUNTER — OFFICE VISIT (OUTPATIENT)
Dept: FAMILY MEDICINE | Facility: CLINIC | Age: 66
End: 2021-07-20
Payer: COMMERCIAL

## 2021-07-20 VITALS
BODY MASS INDEX: 26.95 KG/M2 | OXYGEN SATURATION: 97 % | DIASTOLIC BLOOD PRESSURE: 86 MMHG | HEIGHT: 72 IN | WEIGHT: 199 LBS | SYSTOLIC BLOOD PRESSURE: 128 MMHG | TEMPERATURE: 97.6 F | HEART RATE: 84 BPM | RESPIRATION RATE: 16 BRPM

## 2021-07-20 DIAGNOSIS — E78.5 HYPERLIPIDEMIA, UNSPECIFIED HYPERLIPIDEMIA TYPE: ICD-10-CM

## 2021-07-20 DIAGNOSIS — Z00.00 ENCOUNTER FOR MEDICARE ANNUAL WELLNESS EXAM: Primary | ICD-10-CM

## 2021-07-20 DIAGNOSIS — F17.210 CIGARETTE NICOTINE DEPENDENCE WITHOUT COMPLICATION: ICD-10-CM

## 2021-07-20 DIAGNOSIS — Z11.59 NEED FOR HEPATITIS C SCREENING TEST: ICD-10-CM

## 2021-07-20 PROCEDURE — G0439 PPPS, SUBSEQ VISIT: HCPCS | Performed by: FAMILY MEDICINE

## 2021-07-20 PROCEDURE — 36415 COLL VENOUS BLD VENIPUNCTURE: CPT | Performed by: FAMILY MEDICINE

## 2021-07-20 PROCEDURE — 80061 LIPID PANEL: CPT | Performed by: FAMILY MEDICINE

## 2021-07-20 PROCEDURE — 86803 HEPATITIS C AB TEST: CPT | Performed by: FAMILY MEDICINE

## 2021-07-20 PROCEDURE — 80053 COMPREHEN METABOLIC PANEL: CPT | Performed by: FAMILY MEDICINE

## 2021-07-20 ASSESSMENT — ENCOUNTER SYMPTOMS
CONSTIPATION: 1
ABDOMINAL PAIN: 0
COUGH: 0
DYSURIA: 0
HEMATOCHEZIA: 0
ARTHRALGIAS: 0
CHILLS: 0
HEADACHES: 0
PARESTHESIAS: 0
SORE THROAT: 0
MYALGIAS: 0
WEAKNESS: 0
SHORTNESS OF BREATH: 0
JOINT SWELLING: 0
NERVOUS/ANXIOUS: 0
FREQUENCY: 0
PALPITATIONS: 0
FEVER: 0
HEMATURIA: 0
EYE PAIN: 0
NAUSEA: 0
DIZZINESS: 0
DIARRHEA: 0
HEARTBURN: 0

## 2021-07-20 ASSESSMENT — MIFFLIN-ST. JEOR: SCORE: 1702.72

## 2021-07-20 ASSESSMENT — ANXIETY QUESTIONNAIRES
6. BECOMING EASILY ANNOYED OR IRRITABLE: NOT AT ALL
7. FEELING AFRAID AS IF SOMETHING AWFUL MIGHT HAPPEN: NOT AT ALL
4. TROUBLE RELAXING: NOT AT ALL
GAD7 TOTAL SCORE: 0
5. BEING SO RESTLESS THAT IT IS HARD TO SIT STILL: NOT AT ALL
1. FEELING NERVOUS, ANXIOUS, OR ON EDGE: NOT AT ALL
2. NOT BEING ABLE TO STOP OR CONTROL WORRYING: NOT AT ALL
7. FEELING AFRAID AS IF SOMETHING AWFUL MIGHT HAPPEN: NOT AT ALL
8. IF YOU CHECKED OFF ANY PROBLEMS, HOW DIFFICULT HAVE THESE MADE IT FOR YOU TO DO YOUR WORK, TAKE CARE OF THINGS AT HOME, OR GET ALONG WITH OTHER PEOPLE?: NOT DIFFICULT AT ALL
GAD7 TOTAL SCORE: 0
GAD7 TOTAL SCORE: 0
3. WORRYING TOO MUCH ABOUT DIFFERENT THINGS: NOT AT ALL

## 2021-07-20 ASSESSMENT — PATIENT HEALTH QUESTIONNAIRE - PHQ9
10. IF YOU CHECKED OFF ANY PROBLEMS, HOW DIFFICULT HAVE THESE PROBLEMS MADE IT FOR YOU TO DO YOUR WORK, TAKE CARE OF THINGS AT HOME, OR GET ALONG WITH OTHER PEOPLE: NOT DIFFICULT AT ALL
SUM OF ALL RESPONSES TO PHQ QUESTIONS 1-9: 0
SUM OF ALL RESPONSES TO PHQ QUESTIONS 1-9: 0

## 2021-07-20 ASSESSMENT — ACTIVITIES OF DAILY LIVING (ADL): CURRENT_FUNCTION: NO ASSISTANCE NEEDED

## 2021-07-20 NOTE — PATIENT INSTRUCTIONS
Patient Education   Personalized Prevention Plan  You are due for the preventive services outlined below.  Your care team is available to assist you in scheduling these services.  If you have already completed any of these items, please share that information with your care team to update in your medical record.  Health Maintenance Due   Topic Date Due     ANNUAL REVIEW OF HM ORDERS  Never done     Discuss Advance Care Planning  Never done     Hepatitis C Screening  Never done     Zoster (Shingles) Vaccine (1 of 2) Never done     Annual Wellness Visit  06/17/2021

## 2021-07-20 NOTE — PROGRESS NOTES
"SUBJECTIVE:   Paul Roland is a 66 year old male who presents for Preventive Visit.  Patient has been advised of split billing requirements and indicates understanding: Yes   Are you in the first 12 months of your Medicare coverage?  No    Healthy Habits:     In general, how would you rate your overall health?  Good    Frequency of exercise:  1 day/week    Duration of exercise:  45-60 minutes    Do you usually eat at least 4 servings of fruit and vegetables a day, include whole grains    & fiber and avoid regularly eating high fat or \"junk\" foods?  No    Taking medications regularly:  Yes    Medication side effects:  Not applicable    Ability to successfully perform activities of daily living:  No assistance needed    Home Safety:  No safety concerns identified    Hearing Impairment:  No hearing concerns    In the past 6 months, have you been bothered by leaking of urine?  No    In general, how would you rate your overall mental or emotional health?  Excellent      PHQ-2 Total Score: 0    Additional concerns today:  No  Answers for HPI/ROS submitted by the patient on 7/20/2021  If you checked off any problems, how difficult have these problems made it for you to do your work, take care of things at home, or get along with other people?: Not difficult at all  PHQ9 TOTAL SCORE: 0  BRYNN 7 TOTAL SCORE: 0      Do you feel safe in your environment? Yes    Have you ever done Advance Care Planning? (For example, a Health Directive, POLST, or a discussion with a medical provider or your loved ones about your wishes): No, advance care planning information given to patient to review.  Patient declined advance care planning discussion at this time.       Fall risk  Fallen 2 or more times in the past year?: No  Any fall with injury in the past year?: No    Cognitive Screening   1) Repeat 3 items (Leader, Season, Table)    2) Clock draw: NORMAL  3) 3 item recall: Recalls 1 object   Results: NORMAL clock, 1-2 items recalled: " COGNITIVE IMPAIRMENT LESS LIKELY    Mini-CogTM Copyright IGOR Etienne. Licensed by the author for use in [facility]; reprinted with permission (bonnie@.[facility]). All rights reserved.      Do you have sleep apnea, excessive snoring or daytime drowsiness?: no    Reviewed and updated as needed this visit by clinical staff  Tobacco  Allergies  Meds   Med Hx  Surg Hx  Fam Hx  Soc Hx        Reviewed and updated as needed this visit by Provider                Social History     Tobacco Use     Smoking status: Current Every Day Smoker     Packs/day: 0.50     Types: Cigarettes     Smokeless tobacco: Never Used   Substance Use Topics     Alcohol use: Yes     Comment: few drinks per day     If you drink alcohol do you typically have >3 drinks per day or >7 drinks per week? Yes      Alcohol Use 7/20/2021   Prescreen: >3 drinks/day or >7 drinks/week? No   Prescreen: >3 drinks/day or >7 drinks/week? -       Current providers sharing in care for this patient include:   Patient Care Team:  Davion Sanon MD as PCP - General (Family Medicine)  Zoey Ye MD as MD (Colon and Rectal Surgery)  Sterling Escalona MD as Assigned Musculoskeletal Provider  Eliceo Damon MD as Assigned Surgical Provider  Davion Sanon MD as Assigned PCP    The following health maintenance items are reviewed in Epic and correct as of today:  Health Maintenance Due   Topic Date Due     HEPATITIS C SCREENING  Never done     ZOSTER IMMUNIZATION (1 of 2) Never done     Toe pain. Seeing sports medicine and got cortisone shot and it is helpful.   Left is fine. Just on right side.     Occasionally smokes pot. Around 5-6 cigs per day on regular basis. Started in late 30s and lot less initially smoking.   Not using cholesterol pill. Not good at taking pills.   Metamucil helps with constipation but still constipation present.     Review of Systems   Constitutional: Negative for chills and fever.   HENT:  "Negative for congestion, ear pain, hearing loss and sore throat.    Eyes: Negative for pain and visual disturbance.   Respiratory: Negative for cough and shortness of breath.    Cardiovascular: Negative for chest pain, palpitations and peripheral edema.   Gastrointestinal: Positive for constipation. Negative for abdominal pain, diarrhea, heartburn, hematochezia and nausea.   Genitourinary: Positive for impotence. Negative for discharge, dysuria, frequency, genital sores, hematuria and urgency.   Musculoskeletal: Negative for arthralgias, joint swelling and myalgias.   Skin: Negative for rash.   Neurological: Negative for dizziness, weakness, headaches and paresthesias.   Psychiatric/Behavioral: Negative for mood changes. The patient is not nervous/anxious.      OBJECTIVE:   /86   Pulse 84   Temp 97.6  F (36.4  C) (Temporal)   Resp 16   Ht 1.816 m (5' 11.5\")   Wt 90.3 kg (199 lb)   SpO2 97%   BMI 27.37 kg/m   Estimated body mass index is 27.37 kg/m  as calculated from the following:    Height as of this encounter: 1.816 m (5' 11.5\").    Weight as of this encounter: 90.3 kg (199 lb).  Physical Exam  GENERAL: healthy, alert and no distress  EYES: Eyes grossly normal to inspection, PERRL and conjunctivae and sclerae normal  HENT: ear canals and TM's normal, nose and mouth without ulcers or lesions  NECK: no adenopathy, no asymmetry, masses, or scars and thyroid normal to palpation  RESP: lungs clear to auscultation - no rales, rhonchi or wheezes  CV: regular rate and rhythm, normal S1 S2, no S3 or S4, no murmur, click or rub, no peripheral edema and peripheral pulses strong  ABDOMEN: soft, nontender, no hepatosplenomegaly, no masses and bowel sounds normal  MS: no gross musculoskeletal defects noted, no edema  SKIN: no suspicious lesions or rashes  NEURO: Normal strength and tone, mentation intact and speech normal  PSYCH: mentation appears normal, affect normal/bright    ASSESSMENT / PLAN:   1. Encounter " "for Medicare annual wellness exam       2. Need for hepatitis C screening test     - Hepatitis C Screen Reflex to HCV RNA Quant and Genotype; Future  - Hepatitis C Screen Reflex to HCV RNA Quant and Genotype    3. Hyperlipidemia, unspecified hyperlipidemia type     - Lipid panel reflex to direct LDL Non-fasting; Future  - Comprehensive metabolic panel (BMP + Alb, Alk Phos, ALT, AST, Total. Bili, TP); Future  - Comprehensive metabolic panel (BMP + Alb, Alk Phos, ALT, AST, Total. Bili, TP)  - Lipid panel reflex to direct LDL Non-fasting    4. Cigarette nicotine dependence without complication  Ongoing. Aware of complications. Does not qualify for lung cancer screening but had ct chest due to colon cancer history.       Patient has been advised of split billing requirements and indicates understanding: No  COUNSELING:  Reviewed preventive health counseling, as reflected in patient instructions  Special attention given to:       Regular exercise       Healthy diet/nutrition       Vision screening       Hearing screening       Dental care       Bladder control       Fall risk prevention    Estimated body mass index is 27.37 kg/m  as calculated from the following:    Height as of this encounter: 1.816 m (5' 11.5\").    Weight as of this encounter: 90.3 kg (199 lb).        He reports that he has been smoking cigarettes. He has been smoking about 0.50 packs per day. He has never used smokeless tobacco.  Tobacco Cessation Action Plan:   Information offered: Patient not interested at this time  Self help information given to patient      Appropriate preventive services were discussed with this patient, including applicable screening as appropriate for cardiovascular disease, diabetes, osteopenia/osteoporosis, and glaucoma.  As appropriate for age/gender, discussed screening for colorectal cancer, prostate cancer, breast cancer, and cervical cancer. Checklist reviewing preventive services available has been given to the " patient.    Reviewed patients plan of care and provided an AVS. The Basic Care Plan (routine screening as documented in Health Maintenance) for Paul meets the Care Plan requirement. This Care Plan has been established and reviewed with the Patient.    Counseling Resources:  ATP IV Guidelines  Pooled Cohorts Equation Calculator  Breast Cancer Risk Calculator  Breast Cancer: Medication to Reduce Risk  FRAX Risk Assessment  ICSI Preventive Guidelines  Dietary Guidelines for Americans, 2010  USDA's MyPlate  ASA Prophylaxis  Lung CA Screening    Davion Kylah Sanon MD, MD  [employer]    Identified Health Risks:

## 2021-07-21 LAB
ALBUMIN SERPL-MCNC: 4.1 G/DL (ref 3.4–5)
ALP SERPL-CCNC: 65 U/L (ref 40–150)
ALT SERPL W P-5'-P-CCNC: 37 U/L (ref 0–70)
ANION GAP SERPL CALCULATED.3IONS-SCNC: 6 MMOL/L (ref 3–14)
AST SERPL W P-5'-P-CCNC: 19 U/L (ref 0–45)
BILIRUB SERPL-MCNC: 0.6 MG/DL (ref 0.2–1.3)
BUN SERPL-MCNC: 10 MG/DL (ref 7–30)
CALCIUM SERPL-MCNC: 9.7 MG/DL (ref 8.5–10.1)
CHLORIDE BLD-SCNC: 104 MMOL/L (ref 94–109)
CHOLEST SERPL-MCNC: 276 MG/DL
CO2 SERPL-SCNC: 27 MMOL/L (ref 20–32)
CREAT SERPL-MCNC: 1.03 MG/DL (ref 0.66–1.25)
FASTING STATUS PATIENT QL REPORTED: ABNORMAL
GFR SERPL CREATININE-BSD FRML MDRD: 74 ML/MIN/1.73M2
GLUCOSE BLD-MCNC: 94 MG/DL (ref 70–99)
HCV AB SERPL QL IA: NONREACTIVE
HDLC SERPL-MCNC: 79 MG/DL
LDLC SERPL CALC-MCNC: 172 MG/DL
NONHDLC SERPL-MCNC: 197 MG/DL
POTASSIUM BLD-SCNC: 4.1 MMOL/L (ref 3.4–5.3)
PROT SERPL-MCNC: 8.4 G/DL (ref 6.8–8.8)
SODIUM SERPL-SCNC: 137 MMOL/L (ref 133–144)
TRIGL SERPL-MCNC: 127 MG/DL

## 2021-07-21 ASSESSMENT — ANXIETY QUESTIONNAIRES: GAD7 TOTAL SCORE: 0

## 2021-08-10 ENCOUNTER — TELEPHONE (OUTPATIENT)
Dept: ORTHOPEDICS | Facility: CLINIC | Age: 66
End: 2021-08-10

## 2021-08-10 DIAGNOSIS — M20.21 ACQUIRED HALLUX RIGIDUS, RIGHT: ICD-10-CM

## 2021-08-11 RX ORDER — DICLOFENAC SODIUM 75 MG/1
75 TABLET, DELAYED RELEASE ORAL 2 TIMES DAILY PRN
Qty: 20 TABLET | Refills: 0 | Status: SHIPPED | OUTPATIENT
Start: 2021-08-11 | End: 2021-09-08

## 2021-08-11 NOTE — TELEPHONE ENCOUNTER
diclofenac (VOLTAREN) 75 MG EC tablet  Last Written Prescription Date:  7/8/2021  Last Fill Quantity: 30,   # refills: 0  Last Office Visit : 7/14/2021  Future Office visit:  None    Routing refill request to provider for review/approval because:  Refer to clinic due to we do not fill medications for sports med clinic.  Also, Pt is not between the ages of 6-64 years of age.   Over due CBC.        Shelbi Reyes RN  Central Triage Red Flags/Med Refills

## 2021-09-08 RX ORDER — DICLOFENAC SODIUM 75 MG/1
75 TABLET, DELAYED RELEASE ORAL 2 TIMES DAILY PRN
Qty: 20 TABLET | Refills: 0 | Status: SHIPPED | OUTPATIENT
Start: 2021-09-08 | End: 2023-12-05

## 2021-09-08 NOTE — TELEPHONE ENCOUNTER
Health Call Center    Phone Message    May a detailed message be left on voicemail: yes     Reason for Call: Medication Question or concern regarding medication   Prescription Clarification  Name of Medication: diclofenac (Voltaren) 75 Mg tablets  Prescribing Provider: Dr. Sterling Dia   Pharmacy: [facility] on [address]   What on the order needs clarification? The pt previous to Dr. Dia was seeing Dr. Escalona. The pt stated that when Dr. Escalona would prescribe the diclofenac tablet , he would order 40 tablets. The pt stated he takes 1-2 every day. The pt wants to know if Dr. Dia can amend this prescription so the pt can get 1 refill each month verses calling every 2 weeks. Please have someone reach back out to this pt.    Action Taken: Message routed to:  [facility] Ortho    Travel Screening: Not Applicable

## 2021-09-21 ENCOUNTER — OFFICE VISIT (OUTPATIENT)
Dept: ORTHOPEDICS | Facility: CLINIC | Age: 66
End: 2021-09-21
Payer: COMMERCIAL

## 2021-09-21 VITALS — BODY MASS INDEX: 26.95 KG/M2 | HEIGHT: 72 IN | WEIGHT: 199 LBS

## 2021-09-21 DIAGNOSIS — M20.21 ACQUIRED HALLUX RIGIDUS, RIGHT: Primary | ICD-10-CM

## 2021-09-21 PROCEDURE — 99214 OFFICE O/P EST MOD 30 MIN: CPT | Performed by: FAMILY MEDICINE

## 2021-09-21 RX ORDER — DICLOFENAC SODIUM 75 MG/1
75 TABLET, DELAYED RELEASE ORAL 2 TIMES DAILY
Qty: 40 TABLET | Refills: 0 | Status: SHIPPED | OUTPATIENT
Start: 2021-09-21 | End: 2021-11-10

## 2021-09-21 ASSESSMENT — MIFFLIN-ST. JEOR: SCORE: 1702.72

## 2021-09-21 NOTE — PROGRESS NOTES
"ESTABLISHED PATIENT FOLLOW-UP:  RECHECK (right great toe)       HISTORY OF PRESENT ILLNESS  Mr. Roland is a pleasant 66 year old year old male who presents to clinic today for follow-up of right great toe pain secondary to DJD.    Date of injury/onset: chronic  Date last seen: 7/14/21  Following Therapeutic Plan: Yes.  Pain: improved since injection but pain is returning.   Function: improved  Interval history: . Patient had injection on 7/14/21 and reports he received good relief. He reports for the last 3 weeks he will have 1-2 episodes of severe pain in right great toe. He reports the diclofenac medication is helpful along with NSAID.    Review of Systems:    Do you have fever, chills, weight loss? No    Do you have any vision problems? No    Do you have any chest pain or edema? No    Do you have any shortness of breath or wheezing?  No    Do you have stomach problems? Yes, constipation.     Do you have any numbness or focal weakness? No    Do you have diabetes? No    Do you have problems with bleeding or clotting? No    Do you have an rashes or other skin lesions? No    MEDICAL HISTORY  Patient Active Problem List   Diagnosis     Hyperlipidemia     ED (erectile dysfunction)     Cigarette nicotine dependence without complication       Current Outpatient Medications   Medication Sig Dispense Refill     atorvastatin (LIPITOR) 20 MG tablet Take 1 tablet (20 mg) by mouth daily (Patient not taking: Reported on 7/20/2021) 100 tablet 3     diclofenac (VOLTAREN) 75 MG EC tablet Take 1 tablet (75 mg) by mouth 2 times daily as needed for moderate pain 20 tablet 0     insulin syringe-needle U-100 (BD INSULIN SYRINGE) 29G X 1/2\" 1 ML miscellaneous Use as directed 30 each prn       No Known Allergies    Family History   Problem Relation Age of Onset     Liver Cancer Mother        Additional medical/Social/Surgical histories reviewed in [facility] and updated as appropriate.       PHYSICAL EXAM  Ht 1.816 m (5' 11.5\")   Wt 90.3 kg " (199 lb)   BMI 27.37 kg/m      Musculoskeletal - Right great toe  Right great toe with mild swelling and nodularity at the first MTP.  Palpation with circumferential tenderness.  Decreased range of motion in flexion-extension, no instability.  Neuro  - no numbness, no motor deficit, grossly normal coordination, normal muscle tone  Skin  - no ecchymosis, erythema, warmth, or induration, no obvious rash  Psych  - interactive, appropriate, normal mood and affect     ASSESSMENT & PLAN  Mr. Roland is a 66 year old year old male who presents to clinic today with RECHECK (right great toe)    Diagnosis: Hallux rigidus right    Olive and I had a discussion surrounding his hallux rigidus and severe arthritic changes.  At this time reviewed surgical management and he would like to hold off because he started a new job and feels it would not be the right time for recovery.  We discussed consideration for a Dynaflex type rigid carbon fiber insert which I sent a referral to orthotics for.  He will call to inquire whether they do have this at their shop.  Appropriate supportive soled shoes discussed.  In addition to this he will start a course of diclofenac which will hold him over until he is eligible for repeat ultrasound-guided MTP joint injection.  I scheduled the injection and we will see him back next month.    It was a pleasure seeing Paul.    Sterling Dia DO, CAM  Primary Care Sports Medicine

## 2021-09-21 NOTE — LETTER
"  9/21/2021      RE: Paul Roland  [address]  [address]  [place] [address]       ESTABLISHED PATIENT FOLLOW-UP:  RECHECK (right great toe)       HISTORY OF PRESENT ILLNESS  Mr. Roland is a pleasant 66 year old year old male who presents to clinic today for follow-up of right great toe pain secondary to DJD.    Date of injury/onset: chronic  Date last seen: 7/14/21  Following Therapeutic Plan: Yes.  Pain: improved since injection but pain is returning.   Function: improved  Interval history: . Patient had injection on 7/14/21 and reports he received good relief. He reports for the last 3 weeks he will have 1-2 episodes of severe pain in right great toe. He reports the diclofenac medication is helpful along with NSAID.    Review of Systems:    Do you have fever, chills, weight loss? No    Do you have any vision problems? No    Do you have any chest pain or edema? No    Do you have any shortness of breath or wheezing?  No    Do you have stomach problems? Yes, constipation.     Do you have any numbness or focal weakness? No    Do you have diabetes? No    Do you have problems with bleeding or clotting? No    Do you have an rashes or other skin lesions? No    MEDICAL HISTORY  Patient Active Problem List   Diagnosis     Hyperlipidemia     ED (erectile dysfunction)     Cigarette nicotine dependence without complication       Current Outpatient Medications   Medication Sig Dispense Refill     atorvastatin (LIPITOR) 20 MG tablet Take 1 tablet (20 mg) by mouth daily (Patient not taking: Reported on 7/20/2021) 100 tablet 3     diclofenac (VOLTAREN) 75 MG EC tablet Take 1 tablet (75 mg) by mouth 2 times daily as needed for moderate pain 20 tablet 0     insulin syringe-needle U-100 (BD INSULIN SYRINGE) 29G X 1/2\" 1 ML miscellaneous Use as directed 30 each prn       No Known Allergies    Family History   Problem Relation Age of Onset     Liver Cancer Mother        Additional medical/Social/Surgical histories " "reviewed in [facility] and updated as appropriate.       PHYSICAL EXAM  Ht 1.816 m (5' 11.5\")   Wt 90.3 kg (199 lb)   BMI 27.37 kg/m      Musculoskeletal - Right great toe  Right great toe with mild swelling and nodularity at the first MTP.  Palpation with circumferential tenderness.  Decreased range of motion in flexion-extension, no instability.  Neuro  - no numbness, no motor deficit, grossly normal coordination, normal muscle tone  Skin  - no ecchymosis, erythema, warmth, or induration, no obvious rash  Psych  - interactive, appropriate, normal mood and affect     ASSESSMENT & PLAN  Mr. Roland is a 66 year old year old male who presents to clinic today with RECHECK (right great toe)    Diagnosis: Hallux rigidus right    Paul and I had a discussion surrounding his hallux rigidus and severe arthritic changes.  At this time reviewed surgical management and he would like to hold off because he started a new job and feels it would not be the right time for recovery.  We discussed consideration for a Dynaflex type rigid carbon fiber insert which I sent a referral to orthotics for.  He will call to inquire whether they do have this at their shop.  Appropriate supportive soled shoes discussed.  In addition to this he will start a course of diclofenac which will hold him over until he is eligible for repeat ultrasound-guided MTP joint injection.  I scheduled the injection and we will see him back next month.    It was a pleasure seeing Paul.    Sterling Dia DO, [facility]  Primary Care Sports Medicine        Sterling Dia DO    "

## 2021-10-15 ENCOUNTER — OFFICE VISIT (OUTPATIENT)
Dept: ORTHOPEDICS | Facility: CLINIC | Age: 66
End: 2021-10-15
Payer: COMMERCIAL

## 2021-10-15 VITALS — WEIGHT: 199 LBS | HEIGHT: 72 IN | BODY MASS INDEX: 26.95 KG/M2

## 2021-10-15 DIAGNOSIS — M20.21 ACQUIRED HALLUX RIGIDUS, RIGHT: Primary | ICD-10-CM

## 2021-10-15 PROCEDURE — 99207 PR DROP WITH A PROCEDURE: CPT | Performed by: FAMILY MEDICINE

## 2021-10-15 PROCEDURE — 20604 DRAIN/INJ JOINT/BURSA W/US: CPT | Mod: RT | Performed by: FAMILY MEDICINE

## 2021-10-15 RX ADMIN — LIDOCAINE HYDROCHLORIDE 0.5 ML: 10 INJECTION, SOLUTION EPIDURAL; INFILTRATION; INTRACAUDAL; PERINEURAL at 11:07

## 2021-10-15 RX ADMIN — TRIAMCINOLONE ACETONIDE 20 MG: 40 INJECTION, SUSPENSION INTRA-ARTICULAR; INTRAMUSCULAR at 11:07

## 2021-10-15 ASSESSMENT — MIFFLIN-ST. JEOR: SCORE: 1702.72

## 2021-10-15 NOTE — LETTER
10/15/2021      RE: Paul Roland  [address]  [place] [address]       PROCEDURE ENCOUNTER:  Follow Up of the Right Foot       HISTORY OF PRESENT ILLNESS  Mr. Roland is a pleasant 66 year old year old male who presents to clinic today for follow-up of right great toe pain    Date of injury: Chronic  Date last seen: 10/15/21  Following Therapeutic Plan: Yes  Pain: Persistent  Function: Painful toe  Interval History: Patient had injection on 7/14/21 and reports he received good relief. Relief waned in past 4 weeks.  He reports the diclofenac medication is helpful along with NSAID.    Diagnosis: Hallux rigidus of right foot     First Metatarsophalangeal joint Injection- Ultrasound Guided     The patient was informed of the risks and the benefits of the procedure and a written consent was signed.     The patient s right great toe was prepped with chlorhexidine in sterile fashion.      20 mg of kenalog suspension was drawn up into a 3 mL syringe with 0.5 mL of 1% lidocaine.     Injection was performed using sterile technique.  Under ultrasound guidance a 1.5-inch 25-gauge needle was used to enter the 1st MTP joint of the right great toe.  Needle placement was visualized and documented with ultrasound.  Needle placed in short axis to the probe. Ultrasound visualization was necessary due to decreased joint space in the setting of osteoarthritis.  Images were permanently stored for the patient's record. There were no complications. The patient tolerated the procedure well. There was negligible bleeding.     The patient was instructed to ice the toe upon leaving clinic and refrain from overuse over the next 3 days.      We also discussed purchasing a carbon fiber toe plate over-the-counter as it was very expensive to get fitted at his orthotics visit.     Sterling Dia DO [facility]  Primary Care Sports Medicine  [facility]      Small Joint Injection: R great  MTP    Date/Time: 10/15/2021 11:07 AM  Performed by: Sterling Dia DO  Authorized by: Sterling Dia DO   Indications:  Pain  Needle Size:  25 G  Guidance: ultrasound     Approach:  Dorsal  Location:  Great toe    Site:  R great MTP                    Medications:  20 mg triamcinolone 40 MG/ML; 0.5 mL lidocaine (PF) 1 %        Outcome:  Tolerated well, no immediate complications  Procedure discussed: discussed risks, benefits, and alternatives    Consent Given by:  Patient  Timeout: timeout called immediately prior to procedure    Prep: patient was prepped and draped in usual sterile fashion       Hood Edmond ATC on 10/15/2021 at 11:08 AM      Sterling Dia DO

## 2021-10-15 NOTE — NURSING NOTE
[address] [address]  Dept: [phone]  ______________________________________________________________________________    Patient: Paul Roland   : 1955   MRN: [id number]   October 15, 2021    INVASIVE PROCEDURE SAFETY CHECKLIST    Date: 10/15/21   Procedure: Right MTP USG USG kenalog  Patient Name: Paul Roland  MRN: [id number]  YOB: 1955    Action: Complete sections as appropriate. Any discrepancy results in a HARD COPY until resolved.     PRE PROCEDURE:  Patient ID verified with 2 identifiers (name and  or MRN): Yes  Procedure and site verified with patient/designee (when able): Yes  Accurate consent documentation in medical record: Yes  H&P (or appropriate assessment) documented in medical record: Yes  H&P must be up to 20 days prior to procedure and updates within 24 hours of procedure as applicable: NA  Relevant diagnostic and radiology test results appropriately labeled and displayed as applicable: Yes  Procedure site(s) marked with provider initials: NA    TIMEOUT:  Time-Out performed immediately prior to starting procedure, including verbal and active participation of all team members addressing the following:Yes  * Correct patient identify  * Confirmed that the correct side and site are marked  * An accurate procedure consent form  * Agreement on the procedure to be done  * Correct patient position  * Relevant images and results are properly labeled and appropriately displayed  * The need to administer antibiotics or fluids for irrigation purposes during the procedure as applicable   * Safety precautions based on patient history or medication use    DURING PROCEDURE: Verification of correct person, site, and procedures any time the responsibility for care of the patient is transferred to another member of the care team.       Prior to injection, verified patient identity using patient's name and date of  birth.  Due to injection administration, patient instructed to remain in clinic for 15 minutes  afterwards, and to report any adverse reaction to me immediately.    Joint injection was performed.      Drug Amount Wasted:  Yes: .5 mg/ml   Vial/Syringe: Single dose vial  Expiration Date:  1/1/23        Hood Edmond, [facility]  October 15, 2021

## 2021-10-15 NOTE — PROGRESS NOTES
PROCEDURE ENCOUNTER:  Follow Up of the Right Foot       HISTORY OF PRESENT ILLNESS  Mr. Roland is a pleasant 66 year old year old male who presents to clinic today for follow-up of right great toe pain    Date of injury: Chronic  Date last seen: 10/15/21  Following Therapeutic Plan: Yes  Pain: Persistent  Function: Painful toe  Interval History: Patient had injection on 7/14/21 and reports he received good relief. Relief waned in past 4 weeks.  He reports the diclofenac medication is helpful along with NSAID.    Diagnosis: Hallux rigidus of right foot     First Metatarsophalangeal joint Injection- Ultrasound Guided     The patient was informed of the risks and the benefits of the procedure and a written consent was signed.     The patient s right great toe was prepped with chlorhexidine in sterile fashion.      20 mg of kenalog suspension was drawn up into a 3 mL syringe with 0.5 mL of 1% lidocaine.     Injection was performed using sterile technique.  Under ultrasound guidance a 1.5-inch 25-gauge needle was used to enter the 1st MTP joint of the right great toe.  Needle placement was visualized and documented with ultrasound.  Needle placed in short axis to the probe. Ultrasound visualization was necessary due to decreased joint space in the setting of osteoarthritis.  Images were permanently stored for the patient's record. There were no complications. The patient tolerated the procedure well. There was negligible bleeding.     The patient was instructed to ice the toe upon leaving clinic and refrain from overuse over the next 3 days.      We also discussed purchasing a carbon fiber toe plate over-the-counter as it was very expensive to get fitted at his orthotics visit.     Sterling Dia DO CAQSM  Primary Care Sports Medicine  [facility]      Small Joint Injection: R great MTP    Date/Time: 10/15/2021 11:07 AM  Performed by: Sterling Dia DO  Authorized by: Sterling Dia DO    Indications:  Pain  Needle Size:  25 G  Guidance: ultrasound     Approach:  Dorsal  Location:  Great toe    Site:  R great MTP                    Medications:  20 mg triamcinolone 40 MG/ML; 0.5 mL lidocaine (PF) 1 %        Outcome:  Tolerated well, no immediate complications  Procedure discussed: discussed risks, benefits, and alternatives    Consent Given by:  Patient  Timeout: timeout called immediately prior to procedure    Prep: patient was prepped and draped in usual sterile fashion       Hood Edmond ATC on 10/15/2021 at 11:08 AM

## 2021-10-16 RX ORDER — LIDOCAINE HYDROCHLORIDE 10 MG/ML
0.5 INJECTION, SOLUTION EPIDURAL; INFILTRATION; INTRACAUDAL; PERINEURAL
Status: SHIPPED | OUTPATIENT
Start: 2021-10-15

## 2021-10-16 RX ORDER — TRIAMCINOLONE ACETONIDE 40 MG/ML
20 INJECTION, SUSPENSION INTRA-ARTICULAR; INTRAMUSCULAR
Status: SHIPPED | OUTPATIENT
Start: 2021-10-15

## 2021-10-24 ENCOUNTER — HEALTH MAINTENANCE LETTER (OUTPATIENT)
Age: 66
End: 2021-10-24

## 2021-11-08 DIAGNOSIS — M20.21 ACQUIRED HALLUX RIGIDUS, RIGHT: ICD-10-CM

## 2021-11-10 RX ORDER — DICLOFENAC SODIUM 75 MG/1
75 TABLET, DELAYED RELEASE ORAL 2 TIMES DAILY
Qty: 40 TABLET | Refills: 0 | Status: SHIPPED | OUTPATIENT
Start: 2021-11-10 | End: 2023-10-18

## 2021-11-10 NOTE — TELEPHONE ENCOUNTER
diclofenac (VOLTAREN) 75 MG EC tablet      Last Written Prescription Date:  9/21/21  Last Fill Quantity: 40,   # refills: 0  Last Office Visit : 10/15/21  Future Office visit:  None scheduled    Routing refill request to provider for review/approval because:  Protocol failed  > age 62  Overdue for CBC  Lab Test 06/17/20  1442   WBC 4.3   RBC 4.84   HGB 14.8   HCT 45.0        Nothing more recent in care everywhere nor media. No future orders in queue  Taking only as needed?

## 2021-11-11 ENCOUNTER — TELEPHONE (OUTPATIENT)
Dept: ORTHOPEDICS | Facility: CLINIC | Age: 66
End: 2021-11-11
Payer: COMMERCIAL

## 2021-11-11 NOTE — TELEPHONE ENCOUNTER
[facility] Call Center    Phone Message    May a detailed message be left on voicemail: yes     Reason for Call Please call [facility]. Patient stated They trying to call You but no respond. Rx Diclofena 75 mg. Please call Pharmacy on File Patient stated  Action Taken: Message routed to:  [facility] tacos    Travel Screening: Not Applicable

## 2021-11-11 NOTE — TELEPHONE ENCOUNTER
Spoke with [facility] and they confirmed they received it. She also mentioned that the Pt just called and was informed that it was ready for him,     - Michael Perez, ATC

## 2021-12-07 DIAGNOSIS — M20.21 ACQUIRED HALLUX RIGIDUS, RIGHT: Primary | ICD-10-CM

## 2021-12-07 RX ORDER — DICLOFENAC SODIUM 75 MG/1
75 TABLET, DELAYED RELEASE ORAL 2 TIMES DAILY
Qty: 30 TABLET | Refills: 0 | Status: SHIPPED | OUTPATIENT
Start: 2021-12-07 | End: 2022-01-07

## 2022-01-05 DIAGNOSIS — M20.21 ACQUIRED HALLUX RIGIDUS, RIGHT: ICD-10-CM

## 2022-01-07 NOTE — TELEPHONE ENCOUNTER
DICLOFENAC SOD EC 75 MG TAB      Last Written Prescription Date:  12-7-21  Last Fill Quantity: 30,   # refills: 0  Last Office Visit : 10-15-21  Future Office visit:  none    Routing refill request to provider for review/approval because:  Last fill 30 t:0 ,  ? RF increase disp amt/3RF  Failed protocol AGE  Overdue lab CBC

## 2022-01-10 RX ORDER — DICLOFENAC SODIUM 75 MG/1
TABLET, DELAYED RELEASE ORAL
Qty: 30 TABLET | Refills: 1 | Status: SHIPPED | OUTPATIENT
Start: 2022-01-10 | End: 2022-03-09

## 2022-01-25 ENCOUNTER — OFFICE VISIT (OUTPATIENT)
Dept: ORTHOPEDICS | Facility: CLINIC | Age: 67
End: 2022-01-25
Payer: COMMERCIAL

## 2022-01-25 DIAGNOSIS — M20.21 ACQUIRED HALLUX RIGIDUS, RIGHT: Primary | ICD-10-CM

## 2022-01-25 PROCEDURE — 20604 DRAIN/INJ JOINT/BURSA W/US: CPT | Mod: RT | Performed by: FAMILY MEDICINE

## 2022-01-25 PROCEDURE — 99207 PR DROP WITH A PROCEDURE: CPT | Performed by: FAMILY MEDICINE

## 2022-01-25 RX ORDER — LIDOCAINE HYDROCHLORIDE 10 MG/ML
0.5 INJECTION, SOLUTION EPIDURAL; INFILTRATION; INTRACAUDAL; PERINEURAL
Status: SHIPPED | OUTPATIENT
Start: 2022-01-25

## 2022-01-25 RX ORDER — TRIAMCINOLONE ACETONIDE 40 MG/ML
20 INJECTION, SUSPENSION INTRA-ARTICULAR; INTRAMUSCULAR
Status: SHIPPED | OUTPATIENT
Start: 2022-01-25

## 2022-01-25 RX ADMIN — TRIAMCINOLONE ACETONIDE 20 MG: 40 INJECTION, SUSPENSION INTRA-ARTICULAR; INTRAMUSCULAR at 08:20

## 2022-01-25 RX ADMIN — LIDOCAINE HYDROCHLORIDE 0.5 ML: 10 INJECTION, SOLUTION EPIDURAL; INFILTRATION; INTRACAUDAL; PERINEURAL at 08:20

## 2022-01-25 NOTE — PROGRESS NOTES
PROCEDURE ENCOUNTER:  Follow Up of the Right Great Toe    HISTORY OF PRESENT ILLNESS  Mr. Roland is a pleasant 66 year old year old male who presents to clinic today for follow-up of right great toe pain.    Date of injury: Chronic  Date last seen: 10/15/21  Following Therapeutic Plan: Yes  Pain: Persistent  Function: Painful toe  Interval History: Patient had injection on 10/15/21    Diagnosis: Hallux rigidus of right foot     First Metatarsophalangeal joint Injection- Ultrasound Guided     The patient was informed of the risks and the benefits of the procedure and a written consent was signed.     The patient s right great toe was prepped with chlorhexidine in sterile fashion.      20 mg of kenalog suspension was drawn up into a 3 mL syringe with 0.5 mL of 1% lidocaine.     Injection was performed using sterile technique.  Under ultrasound guidance a 1.5-inch 25-gauge needle was used to enter the 1st MTP joint of the right great toe.  Needle placement was visualized and documented with ultrasound.  Needle placed in short axis to the probe. Ultrasound visualization was necessary due to decreased joint space in the setting of osteoarthritis.  Images were permanently stored for the patient's record. There were no complications. The patient tolerated the procedure well. There was negligible bleeding.     The patient was instructed to ice the toe upon leaving clinic and refrain from overuse over the next 3 days.      We also discussed orthotics.    Small Joint Injection/Arthrocentesis: R great MTP    Date/Time: 1/25/2022 8:20 AM  Performed by: Sterling Dia DO  Authorized by: Sterling Dia DO   Indications:  Pain  Needle Size:  25 G  Guidance: ultrasound     Approach:  Dorsal  Location:  Great toe    Site:  R great MTP                    Medications:  20 mg triamcinolone 40 MG/ML; 0.5 mL lidocaine (PF) 1 %        Outcome:  Tolerated well, no immediate complications  Procedure discussed: discussed risks, benefits,  and alternatives    Consent Given by:  Patient  Timeout: timeout called immediately prior to procedure    Prep: patient was prepped and draped in usual sterile fashion       Scribed by Michael Perez, ATC, ATC for Dr. Dia on 1/25/22 at 8:20AM, based on the provider's statements to me.               Sterling Dia, DO [facility]  Primary Care Sports Medicine  [facility]

## 2022-01-25 NOTE — LETTER
1/25/2022      RE: Paul Roland  [address]  [address]  [place] [address]       PROCEDURE ENCOUNTER:  Follow Up of the Right Great Toe    HISTORY OF PRESENT ILLNESS  Mr. Roland is a pleasant 66 year old year old male who presents to clinic today for follow-up of right great toe pain.    Date of injury: Chronic  Date last seen: 10/15/21  Following Therapeutic Plan: Yes  Pain: Persistent  Function: Painful toe  Interval History: Patient had injection on 10/15/21    Diagnosis: Hallux rigidus of right foot     First Metatarsophalangeal joint Injection- Ultrasound Guided     The patient was informed of the risks and the benefits of the procedure and a written consent was signed.     The patient s right great toe was prepped with chlorhexidine in sterile fashion.      20 mg of kenalog suspension was drawn up into a 3 mL syringe with 0.5 mL of 1% lidocaine.     Injection was performed using sterile technique.  Under ultrasound guidance a 1.5-inch 25-gauge needle was used to enter the 1st MTP joint of the right great toe.  Needle placement was visualized and documented with ultrasound.  Needle placed in short axis to the probe. Ultrasound visualization was necessary due to decreased joint space in the setting of osteoarthritis.  Images were permanently stored for the patient's record. There were no complications. The patient tolerated the procedure well. There was negligible bleeding.     The patient was instructed to ice the toe upon leaving clinic and refrain from overuse over the next 3 days.      We also discussed orthotics.    Small Joint Injection/Arthrocentesis: R great MTP    Date/Time: 1/25/2022 8:20 AM  Performed by: Sterling Dia DO  Authorized by: Sterling Dia DO   Indications:  Pain  Needle Size:  25 G  Guidance: ultrasound     Approach:  Dorsal  Location:  Great toe    Site:  R great MTP                    Medications:  20 mg triamcinolone 40 MG/ML; 0.5 mL lidocaine (PF) 1 %         Outcome:  Tolerated well, no immediate complications  Procedure discussed: discussed risks, benefits, and alternatives    Consent Given by:  Patient  Timeout: timeout called immediately prior to procedure    Prep: patient was prepped and draped in usual sterile fashion       Scribed by Michael Perez ATC, ATC for Dr. Dia on 1/25/22 at 8:20AM, based on the provider's statements to me.           Sterling Dia DO [facility]  Primary Care Sports Medicine  [facility]

## 2022-01-25 NOTE — NURSING NOTE
[address] [address]  Dept: [phone]  ______________________________________________________________________________    Patient: Paul Roland   : 1955   MRN: [id number]   2022    INVASIVE PROCEDURE SAFETY CHECKLIST    Date: 22   Procedure:R MTP great toe US guided with nba  Patient Name: Paul Roland  MRN: [id number]  YOB: 1955    Action: Complete sections as appropriate. Any discrepancy results in a HARD COPY until resolved.     PRE PROCEDURE:  Patient ID verified with 2 identifiers (name and  or MRN): Yes  Procedure and site verified with patient/designee (when able): Yes  Accurate consent documentation in medical record: Yes  H&P (or appropriate assessment) documented in medical record: Yes  H&P must be up to 20 days prior to procedure and updates within 24 hours of procedure as applicable: NA  Relevant diagnostic and radiology test results appropriately labeled and displayed as applicable: Yes  Procedure site(s) marked with provider initials: NA    TIMEOUT:  Time-Out performed immediately prior to starting procedure, including verbal and active participation of all team members addressing the following:Yes  * Correct patient identify  * Confirmed that the correct side and site are marked  * An accurate procedure consent form  * Agreement on the procedure to be done  * Correct patient position  * Relevant images and results are properly labeled and appropriately displayed  * The need to administer antibiotics or fluids for irrigation purposes during the procedure as applicable   * Safety precautions based on patient history or medication use    DURING PROCEDURE: Verification of correct person, site, and procedures any time the responsibility for care of the patient is transferred to another member of the care team.       Prior to injection, verified patient identity using patient's name and  date of birth.  Due to injection administration, patient instructed to remain in clinic for 15 minutes  afterwards, and to report any adverse reaction to me immediately.    Joint injection was performed.      Drug Amount Wasted:  Yes: .5 mg/ml   Vial/Syringe: Single dose vial  Expiration Date:  7/23 - nba Perez, [facility]  January 25, 2022

## 2022-03-06 DIAGNOSIS — M20.21 ACQUIRED HALLUX RIGIDUS, RIGHT: ICD-10-CM

## 2022-03-09 RX ORDER — DICLOFENAC SODIUM 75 MG/1
TABLET, DELAYED RELEASE ORAL
Qty: 30 TABLET | Refills: 1 | Status: SHIPPED | OUTPATIENT
Start: 2022-03-09 | End: 2022-04-29

## 2022-03-09 NOTE — TELEPHONE ENCOUNTER
DICLOFENAC SOD EC 75 MG TAB      Last Written Prescription Date:  1/10/22  Last Fill Quantity: 30,   # refills: 1  Last Office Visit : 1/25/22  Future Office visit:  none    Routing refill request to provider for review/approval because:   Failed protocol AGE>64  Overdue lab CBC ( last 6/17/20)

## 2022-04-27 DIAGNOSIS — M20.21 ACQUIRED HALLUX RIGIDUS, RIGHT: ICD-10-CM

## 2022-04-29 NOTE — TELEPHONE ENCOUNTER
DICLOFENAC SOD EC 75 MG TAB      Last Written Prescription Date:  3/9/22  Last Fill Quantity: 30,   # refills: 1  Last Office Visit : 1/25/22  Future Office visit:  None scheduled    Routing refill request to provider for review/approval because:  Failed NSAID protocol  > 62 years of age  Most recent CBC    Lab Test 06/17/20  1442   WBC 4.3   RBC 4.84   HGB 14.8   HCT 45.0

## 2022-05-02 RX ORDER — DICLOFENAC SODIUM 75 MG/1
75 TABLET, DELAYED RELEASE ORAL 2 TIMES DAILY
Qty: 30 TABLET | Refills: 1 | Status: SHIPPED | OUTPATIENT
Start: 2022-05-02 | End: 2022-07-01

## 2022-05-17 ENCOUNTER — NURSE TRIAGE (OUTPATIENT)
Dept: NURSING | Facility: CLINIC | Age: 67
End: 2022-05-17
Payer: COMMERCIAL

## 2022-05-17 NOTE — TELEPHONE ENCOUNTER
Triage call  Patient calling to report  Rt eye pain the white eye red  Is throbbing the eye lid is red and swollen and he can only open eye half way.  Rates pain a 10/10.  He states the eye chase all the time     Per protocol go to office now.  No appointments available he is willing to go to a different clinic.  He did not want to go to urgent care.  He is going to call in the morning to see if he can get a appointment  Tomorrow.  Routing to PCP    Stella Castro RN   [facility] Nurse Advisor  2:26 PM 5/17/2022            Reason for Disposition    Eyelids are very swollen (shut or almost) and no fever    Additional Information    Negative: Followed an eye injury    Negative: Eye pain from chemical in the eye    Negative: Eye pain from foreign body in eye    Negative: Has sinus pain or pressure    Negative: Vomiting    Negative: Ulcer or sore seen on the cornea (clear center part of the eye)    Negative: Recent eye surgery and increasing eye pain    Negative: Blurred vision and new or worsening    Negative: Patient sounds very sick or weak to the triager    Negative: Severe eye pain    Negative: Complete loss of vision in one or both eyes    Negative: Eyelids are very swollen (shut or almost) and fever    Negative: Eyelid (outer) is very red and fever    Negative: Foreign body sensation ('feels like something is in there') and irrigation didn't help    Protocols used: EYE PAIN-A-[place]

## 2022-05-17 NOTE — TELEPHONE ENCOUNTER
I called pt and let him know Dr. Sanon's recommendation. He says he will go into Urgent care.     ONEIDA Hull RN  [facility]

## 2022-05-31 ENCOUNTER — OFFICE VISIT (OUTPATIENT)
Dept: ORTHOPEDICS | Facility: CLINIC | Age: 67
End: 2022-05-31
Payer: COMMERCIAL

## 2022-05-31 VITALS — WEIGHT: 199 LBS | BODY MASS INDEX: 26.95 KG/M2 | HEIGHT: 72 IN

## 2022-05-31 DIAGNOSIS — M20.21 ACQUIRED HALLUX RIGIDUS, RIGHT: Primary | ICD-10-CM

## 2022-05-31 DIAGNOSIS — M79.671 RIGHT FOOT PAIN: ICD-10-CM

## 2022-05-31 PROCEDURE — 99207 PR DROP WITH A PROCEDURE: CPT | Performed by: FAMILY MEDICINE

## 2022-05-31 PROCEDURE — 20604 DRAIN/INJ JOINT/BURSA W/US: CPT | Mod: RT | Performed by: FAMILY MEDICINE

## 2022-05-31 RX ORDER — LIDOCAINE HYDROCHLORIDE 10 MG/ML
0.5 INJECTION, SOLUTION EPIDURAL; INFILTRATION; INTRACAUDAL; PERINEURAL
Status: SHIPPED | OUTPATIENT
Start: 2022-05-31

## 2022-05-31 RX ORDER — TRIAMCINOLONE ACETONIDE 40 MG/ML
20 INJECTION, SUSPENSION INTRA-ARTICULAR; INTRAMUSCULAR
Status: SHIPPED | OUTPATIENT
Start: 2022-05-31

## 2022-05-31 RX ADMIN — LIDOCAINE HYDROCHLORIDE 0.5 ML: 10 INJECTION, SOLUTION EPIDURAL; INFILTRATION; INTRACAUDAL; PERINEURAL at 13:52

## 2022-05-31 RX ADMIN — TRIAMCINOLONE ACETONIDE 20 MG: 40 INJECTION, SUSPENSION INTRA-ARTICULAR; INTRAMUSCULAR at 13:52

## 2022-05-31 NOTE — PROGRESS NOTES
Small Joint Injection: R great MTP    Date/Time: 5/31/2022 1:52 PM  Performed by: Sterling Dia DO  Authorized by: Sterling Dia DO   Indications:  Pain  Needle Size:  25 G  Guidance: ultrasound     Approach:  Dorsal  Location:  Great toe    Site:  R great MTP                    Medications:  20 mg triamcinolone 40 MG/ML; 0.5 mL lidocaine (PF) 1 %        Outcome:  Tolerated well, no immediate complications  Procedure discussed: discussed risks, benefits, and alternatives    Consent Given by:  Patient  Timeout: timeout called immediately prior to procedure    Prep: patient was prepped and draped in usual sterile fashion       Hood Edmond ATC on 5/31/2022 at 1:54 PM

## 2022-05-31 NOTE — LETTER
2022    RE: Paul Roland  [address]  [address]  [place] [address]     Dear Colleague,    Thank you for referring your patient, Paul Roland, to the [facility]. Please see a copy of my visit note below.      Small Joint Injection: R great MTP    Date/Time: 2022 1:52 PM  Performed by: Sterling Dia DO  Authorized by: Sterling Dia DO   Indications:  Pain  Needle Size:  25 G  Guidance: ultrasound     Approach:  Dorsal  Location:  Great toe    Site:  R great MTP             Medications:  20 mg triamcinolone 40 MG/ML; 0.5 mL lidocaine (PF) 1 %        Outcome:  Tolerated well, no immediate complications  Procedure discussed: discussed risks, benefits, and alternatives    Consent Given by:  Patient  Timeout: timeout called immediately prior to procedure    Prep: patient was prepped and draped in usual sterile fashion       Hood Edmodn ATC on 2022 at 1:54 PM                PROCEDURE ENCOUNTER    [facility]  Orthopedics  Sterling Dia DO  May 31, 2022     Name: Paul Roland  MRN: [id number]  Age: 67 year old  : 1955    Referring provider: HEMANT  Diagnosis:     First Metatarsophalangeal joint Injection- Ultrasound Guided     The patient was informed of the risks and the benefits of the procedure and a written consent was signed.     The patient s right great toe was prepped with chlorhexidine in sterile fashion.      20 mg of kenalog suspension was drawn up into a 3 mL syringe with 0.5 mL of 1% lidocaine.     Injection was performed using sterile technique.  Under ultrasound guidance a 1-inch 25-gauge needle was used to enter the 1st MTP joint of the right great toe.  Needle placement was visualized and documented with ultrasound.  Needle placed in short axis to the probe. Ultrasound visualization was necessary due to decreased joint space in the setting of osteoarthritis.  Images were permanently stored for the patient's record. There  were no complications. The patient tolerated the procedure well. There was negligible bleeding.     The patient was instructed to ice the toe upon leaving clinic and refrain from overuse over the next 3 days.      We also discussed orthotics.    Again, thank you for allowing me to participate in the care of your patient.      Sincerely,    Sterling Dia, DO

## 2022-05-31 NOTE — NURSING NOTE
[address] [address]  Dept: [phone]  ______________________________________________________________________________    Patient: Paul Roland   : 1955   MRN: [id number]   May 31, 2022    INVASIVE PROCEDURE SAFETY CHECKLIST    Date: 22   Procedure: Right Toe MTP USG CSI   Patient Name: Paul Roland  MRN: [id number]  YOB: 1955    Action: Complete sections as appropriate. Any discrepancy results in a HARD COPY until resolved.     PRE PROCEDURE:  Patient ID verified with 2 identifiers (name and  or MRN): Yes  Procedure and site verified with patient/designee (when able): Yes  Accurate consent documentation in medical record: Yes  H&P (or appropriate assessment) documented in medical record: Yes  H&P must be up to 20 days prior to procedure and updates within 24 hours of procedure as applicable: NA  Relevant diagnostic and radiology test results appropriately labeled and displayed as applicable: Yes  Procedure site(s) marked with provider initials: NA    TIMEOUT:  Time-Out performed immediately prior to starting procedure, including verbal and active participation of all team members addressing the following:Yes  * Correct patient identify  * Confirmed that the correct side and site are marked  * An accurate procedure consent form  * Agreement on the procedure to be done  * Correct patient position  * Relevant images and results are properly labeled and appropriately displayed  * The need to administer antibiotics or fluids for irrigation purposes during the procedure as applicable   * Safety precautions based on patient history or medication use    DURING PROCEDURE: Verification of correct person, site, and procedures any time the responsibility for care of the patient is transferred to another member of the care team.       Prior to injection, verified patient identity using patient's name and date of birth.  Due to  injection administration, patient instructed to remain in clinic for 15 minutes  afterwards, and to report any adverse reaction to me immediately.    Joint injection was performed.      Drug Amount Wasted:  None.  Vial/Syringe: Single dose vial  Expiration Date:  12/1/23      Hood Edmond, [facility]  May 31, 2022

## 2022-05-31 NOTE — PROGRESS NOTES
PROCEDURE ENCOUNTER    [facility]  Orthopedics  WilfridoMIKE Dia, DO  May 31, 2022     Name: Paul Roland  MRN: [id number]  Age: 67 year old  : 1955    Referring provider: HEMANT  Diagnosis:     First Metatarsophalangeal joint Injection- Ultrasound Guided     The patient was informed of the risks and the benefits of the procedure and a written consent was signed.     The patient s right great toe was prepped with chlorhexidine in sterile fashion.      20 mg of kenalog suspension was drawn up into a 3 mL syringe with 0.5 mL of 1% lidocaine.     Injection was performed using sterile technique.  Under ultrasound guidance a 1-inch 25-gauge needle was used to enter the 1st MTP joint of the right great toe.  Needle placement was visualized and documented with ultrasound.  Needle placed in short axis to the probe. Ultrasound visualization was necessary due to decreased joint space in the setting of osteoarthritis.  Images were permanently stored for the patient's record. There were no complications. The patient tolerated the procedure well. There was negligible bleeding.     The patient was instructed to ice the toe upon leaving clinic and refrain from overuse over the next 3 days.      We also discussed orthotics.

## 2022-06-22 ENCOUNTER — TRANSFERRED RECORDS (OUTPATIENT)
Dept: HEALTH INFORMATION MANAGEMENT | Facility: CLINIC | Age: 67
End: 2022-06-22

## 2022-06-24 ENCOUNTER — TRANSCRIBE ORDERS (OUTPATIENT)
Dept: OTHER | Age: 67
End: 2022-06-24

## 2022-06-24 DIAGNOSIS — H40.009 GLAUCOMA SUSPECT, UNSPECIFIED LATERALITY: Primary | ICD-10-CM

## 2022-06-30 ENCOUNTER — TELEPHONE (OUTPATIENT)
Dept: ORTHOPEDICS | Facility: CLINIC | Age: 67
End: 2022-06-30

## 2022-06-30 DIAGNOSIS — M20.21 ACQUIRED HALLUX RIGIDUS, RIGHT: ICD-10-CM

## 2022-06-30 NOTE — TELEPHONE ENCOUNTER
[facility] Call Center    Phone Message    May a detailed message be left on voicemail: yes     Reason for Call: Medication Refill Request    Has the patient contacted the pharmacy for the refill? Yes   Name of medication being requested: diclofenac (VOLTAREN) 75 MG EC tablet  Provider who prescribed the medication: dr. Sterling Dia  Pharmacy: [facility]   Date medication is needed: 07/10        Action Taken: Message routed to:  [facility] Sports Medicine - Dr. Sterling Dia    Travel Screening: Not Applicable

## 2022-07-01 RX ORDER — DICLOFENAC SODIUM 75 MG/1
75 TABLET, DELAYED RELEASE ORAL 2 TIMES DAILY
Qty: 30 TABLET | Refills: 1 | Status: SHIPPED | OUTPATIENT
Start: 2022-07-01 | End: 2022-08-25

## 2022-08-09 ENCOUNTER — OFFICE VISIT (OUTPATIENT)
Dept: OPHTHALMOLOGY | Facility: CLINIC | Age: 67
End: 2022-08-09
Attending: OPTOMETRIST
Payer: COMMERCIAL

## 2022-08-09 DIAGNOSIS — H40.003 GLAUCOMA SUSPECT OF BOTH EYES: Primary | ICD-10-CM

## 2022-08-09 PROCEDURE — 92004 COMPRE OPH EXAM NEW PT 1/>: CPT | Mod: GC | Performed by: OPHTHALMOLOGY

## 2022-08-09 PROCEDURE — G0463 HOSPITAL OUTPT CLINIC VISIT: HCPCS

## 2022-08-09 PROCEDURE — 92083 EXTENDED VISUAL FIELD XM: CPT | Performed by: OPHTHALMOLOGY

## 2022-08-09 PROCEDURE — 92133 CPTRZD OPH DX IMG PST SGM ON: CPT | Performed by: OPHTHALMOLOGY

## 2022-08-09 RX ORDER — LATANOPROST 50 UG/ML
1 SOLUTION/ DROPS OPHTHALMIC AT BEDTIME
Qty: 7.5 ML | Refills: 4 | Status: SHIPPED | OUTPATIENT
Start: 2022-08-09 | End: 2023-10-18

## 2022-08-09 ASSESSMENT — CONF VISUAL FIELD
OD_NORMAL: 1
METHOD: COUNTING FINGERS
OS_NORMAL: 1

## 2022-08-09 ASSESSMENT — PACHYMETRY
OS_CT(UM): 559
OD_CT(UM): 566

## 2022-08-09 ASSESSMENT — VISUAL ACUITY
OS_CC: 20/25
OD_CC+: -1
OD_CC: 20/25
METHOD: SNELLEN - LINEAR

## 2022-08-09 ASSESSMENT — REFRACTION_WEARINGRX
OS_CYLINDER: SPHERE
OD_SPHERE: -2.00
OD_CYLINDER: SPHERE
OS_SPHERE: -2.00

## 2022-08-09 ASSESSMENT — TONOMETRY
IOP_METHOD: APPLANATE
OD_IOP_MMHG: 24
OD_IOP_MMHG: 16
IOP_METHOD: TONOPEN
OS_IOP_MMHG: 22
OS_IOP_MMHG: 14

## 2022-08-09 ASSESSMENT — EXTERNAL EXAM - RIGHT EYE: OD_EXAM: NORMAL

## 2022-08-09 ASSESSMENT — EXTERNAL EXAM - LEFT EYE: OS_EXAM: NORMAL

## 2022-08-09 NOTE — PATIENT INSTRUCTIONS
Start Latanoprost in both eyes at bedtime  Start artificial tears 4 times per day as needed in both eyes

## 2022-08-09 NOTE — NURSING NOTE
Chief Complaints and History of Present Illnesses   Patient presents with     Glaucoma Evaluation     Chief Complaint(s) and History of Present Illness(es)     Glaucoma Evaluation     Laterality: both eyes    Associated symptoms: eye pain and tearing.  Negative for flashes and floaters              Comments     66yo male here for glaucoma evaluation. Since his last eye exam, his vision in both eyes are about the same. He notes occasional pain in the right with tearing. He uses lubricating drops as needed which has helped with the irritation. No pain.    Kyle Mendiola COT 12:55 PM August 9, 2022

## 2022-08-09 NOTE — PROGRESS NOTES
Chief Complaint/Presenting Concern: new referral glaucoma evaluation     History of Present Illness:   Paul Roland is a 67 year old patient who presents for evaluation of glaucoma. Seen by Dr. Zhong (optometrist) and referred for evaluation of glaucoma suspect.     Patient notes today that he has had epiphora and foreign body sensation particularly in the right eye. Feels like there is something pressing against the right eye.     Relevant Past Medical/Family/Social History:  PMH: arthritis  FH: non contributory     Relevant Review of Systems: none     Diagnosis: Glaucoma suspect due to abnormal disc or VF findings with low IOP  Year diagnosis:2022  Previous glaucoma surgery/laser:  Maximum intraocular pressure 21/21  Currently Meds: none  Family history: negative  CCT: 566/559  Gonio: open to scleral spur 360 in both eyes  Refractive status: low myope  Trauma history: negative  Steroid exposure: negative steroid injections in metatarsal joint [id number] right foot  Vasospastic disease: Migrane/Raynaud phenomenon: negative  A past hemodynamic crisis or Low BP: negative  Meds AEs/intolerance: none  PMHx: Asthma and respiratory problems/Cardiac/Renal/Kidney stones/Sulfa Allergy: none  Anticoagulants: none    Today's testing:  - IOP: 16/14 mmHg  - Visual field August 9, 2022  - Right eye - scattered defects in both hemifields, ? Nasal step inferior and superiorly, reliable  - Left eye - inferior nasal step with extension, superior arcuate, FN 12%, reliable  - OCT Optic Nerve RNFL Spectralis August 9, 2022  - Right Eye: Superior and IT RNFL thinning, baseline   - Left Eye: Superior and IT RNFL thinning, baseline     Additional Ocular History:     2. Cataracts each eye   - not visually significant    3. RICKY  - AT QID PRN  - warm compresses    Plan/Recommendations:    Discussed findings with patient. Findings suggestive of glaucoma each eye, recommend starting treatment.     Start Latanoprost at bedtime each eye      Repeat VF on next visit for second baseline    RTC in 6 weeks VA, IOP, VF     Be Whatley MD  Resident Physician, PGY-3  Department of Ophthalmology  08/09/22 2:19 PM      Physician Attestation     Attending Physician Attestation:  Complete documentation of historical and exam elements from today's encounter can be found in the full encounter summary report (not reduplicated in this progress note). I personally obtained the chief complaint(s) and history of present illness. I confirmed and edited as necessary the review of systems, past medical/surgical history, family history, social history, and examination findings as documented by others; and I examined the patient myself. I personally reviewed the relevant tests, images, and reports as documented above. I personally reviewed the ophthalmic test(s) associated with this encounter, agree with the interpretation(s) as documented by the resident/fellow and have edited the corresponding report(s) as necessary. I formulated and edited as necessary the assessment and plan and discussed the findings and management plan with the patient and any family members present at the time of the visit.  Yadira Noriega M.D., Glaucoma, August 9, 2022

## 2022-08-21 DIAGNOSIS — M20.21 ACQUIRED HALLUX RIGIDUS, RIGHT: ICD-10-CM

## 2022-08-25 ENCOUNTER — TELEPHONE (OUTPATIENT)
Dept: ORTHOPEDICS | Facility: CLINIC | Age: 67
End: 2022-08-25

## 2022-08-25 RX ORDER — DICLOFENAC SODIUM 75 MG/1
75 TABLET, DELAYED RELEASE ORAL 2 TIMES DAILY
Qty: 30 TABLET | Refills: 0 | Status: SHIPPED | OUTPATIENT
Start: 2022-08-25 | End: 2023-10-18

## 2022-08-25 NOTE — TELEPHONE ENCOUNTER
DICLOFENAC SOD EC 75 MG TAB  Last Written Prescription Date:   7/1/2022  Last Fill Quantity: 30,   # refills: 1  Last Office Visit :  5/31/2022  Future Office visit:  None    Routing refill request to provider for review/approval because:  Refer to Provider for review and refills.    Shelbi Reyes RN  Central Triage Red Flags/Med Refills

## 2022-09-01 ENCOUNTER — TELEPHONE (OUTPATIENT)
Dept: GASTROENTEROLOGY | Facility: CLINIC | Age: 67
End: 2022-09-01

## 2022-09-01 DIAGNOSIS — Z12.11 ENCOUNTER FOR SCREENING COLONOSCOPY: Primary | ICD-10-CM

## 2022-09-01 NOTE — TELEPHONE ENCOUNTER
Screening Questions    BlueKIND OF PREP RedLOCATION [review exclusion criteria] GreenSEDATION TYPE    N Have you had a positive covid test in the last 90 days?   If yes, what date?     Y Do you have a legal guardian or medical Power of ?  Are you able to give consent for your medical care?  (Sedation review/consideration needed)    1. Y Are you active on mychart?     2. BCBS MEDICARE What insurance is in the chart?      3.    KANDY CASON Ordering/Referring Provider:     4.    27.8 BMI [BMI OVER 40-EXTENDED PREP]  If greater than 40 review exclusion criteria [PAC APPT IF (MAC) @ U]      5.  Respiratory Screening:  [If yes to any of the following HOSPITAL setting only]     N      Do you use daily home oxygen?   N      Do you have mod to severe Obstructive Sleep Apnea?  [OKAY @ [facility] SH PH RI]   N      Do you have Pulmonary Hypertension?      N      Do you have UNCONTROLLED asthma?      6.    N Have you had a heart or lung transplant?       7.    N Are you currently on dialysis? [If yes, G-PREP & HOSPITAL setting only]     8.    N  Do you have chronic kidney disease? [If yes, G-PREP]    9.    N Have you had a stroke or Transient ischemic attack (TIA - aka  mini stroke ) within 6 months? (If yes, please review exclusion criteria)         N  In the past 6 months, have you had any heart related issues including cardiomyopathy or heart attack?          N  If yes, did it require cardiac stenting or other implantable device?       10   N Do you have any implantable devices in your body (pacemaker, defib, LVAD)? (If yes, please review exclusion criteria)    11.  N Do you take nitroglycerin?          N If yes, how often?  (If yes, HOSPITAL setting ONLY)    12.  N Are you currently taking any blood thinners?           [IF YES, INFORM PATIENT TO FOLLOW UP W/ ORDERING PROVIDER FOR BRIDGING INSTRUCTIONS]     13.  N Do you take Phentermine?      Yes-> Hold for 7 days before procedure.  Please consult  your prescribing provider if you have questions about holding this medication.    14.  N Are you taking any prescription pain medications on a routine schedule? [If yes, EXTENDED PREP.] [If yes, MAC]    15.  N  Do you have a diagnosis of diabetes?[If yes, G-PREP]    16.       [FEMALES] are you currently pregnant?                If yes, how many weeks?     17.  N Do you have any chemical dependencies such as alcohol, street drugs, or methadone? [If yes, MAC]    18.  N Do you have any history of post-traumatic stress syndrome, severe anxiety or history of psychosis? [If yes, MAC]    19.  Y Do you transfer independently? (If NO, please HOSPITAL setting  only)     20.  N  On a regular basis do you go 3-5 days between bowel movements?[ If yes, EXTENDED PREP.]    21.   Preferred LOCAL Pharmacy for Pre Prescription:     [facility]                            Scheduling Details       Caller:   (Please ask for phone number if not scheduled by patient)    Type of Procedure Scheduled: Lower Endoscopy [Colonoscopy]    GPREP Which Colonoscopy Prep was Sent:   GALLITO CF PATIENTS & GROEN'S PATIENTS NEEDS EXTENDED PREP    Date of Procedure: 9/21  Surgeon: JACQUELINE  Location: [facility]    Sedation Type: MAC    Conscious Sedation- Needs  for 6 hours after the procedure  MAC/General-Needs  for 24 hours after procedure    N Pre-op Required at [facility], [place], [place] and OR for MAC sedation:        (Advise patient they will need a pre-op WITH IN 30 DAYS prior to procedure -)      Y Informed patient they will need an adult          Cannot take any type of public or medical transportation alone    Y Confirmed Nurse will call to complete assessment     HOME Pre-Procedure [facility] or Externally:      Additional comments:

## 2022-09-12 ENCOUNTER — TELEPHONE (OUTPATIENT)
Dept: GASTROENTEROLOGY | Facility: CLINIC | Age: 67
End: 2022-09-12

## 2022-09-12 DIAGNOSIS — Z12.11 ENCOUNTER FOR SCREENING COLONOSCOPY: Primary | ICD-10-CM

## 2022-09-12 RX ORDER — BISACODYL 5 MG/1
TABLET, DELAYED RELEASE ORAL
Qty: 4 TABLET | Refills: 0 | Status: SHIPPED | OUTPATIENT
Start: 2022-09-12 | End: 2023-10-18

## 2022-09-13 NOTE — TELEPHONE ENCOUNTER
Pre assessment questions completed for upcoming colonoscopy procedure scheduled on 9.21.2022    Bring proof of COVID vaccination.    Reviewed procedural arrival time 1030 and facility location [facility].    Designated  policy reviewed. Instructed to have someone stay 24 hours post procedure.     Anticoagulation/blood thinners? no    Electronic implanted devices? no    Reviewed Golytely prep instructions with patient. No fiber/iron supplements or foods that contain nuts/seeds prior to procedure.     Patient verbalized understanding and had no questions or concerns at this time.    Kylie Walker RN

## 2022-09-16 ENCOUNTER — OFFICE VISIT (OUTPATIENT)
Dept: ORTHOPEDICS | Facility: CLINIC | Age: 67
End: 2022-09-16
Payer: COMMERCIAL

## 2022-09-16 VITALS — BODY MASS INDEX: 26.95 KG/M2 | WEIGHT: 199 LBS | HEIGHT: 72 IN

## 2022-09-16 DIAGNOSIS — M20.21 ACQUIRED HALLUX RIGIDUS, RIGHT: Primary | ICD-10-CM

## 2022-09-16 PROCEDURE — 20604 DRAIN/INJ JOINT/BURSA W/US: CPT | Mod: RT | Performed by: FAMILY MEDICINE

## 2022-09-16 PROCEDURE — 99207 PR DROP WITH A PROCEDURE: CPT | Performed by: FAMILY MEDICINE

## 2022-09-16 RX ORDER — DICLOFENAC SODIUM 75 MG/1
75 TABLET, DELAYED RELEASE ORAL 2 TIMES DAILY PRN
Qty: 60 TABLET | Refills: 0 | Status: SHIPPED | OUTPATIENT
Start: 2022-09-16 | End: 2022-10-19

## 2022-09-16 RX ORDER — TRIAMCINOLONE ACETONIDE 40 MG/ML
20 INJECTION, SUSPENSION INTRA-ARTICULAR; INTRAMUSCULAR
Status: SHIPPED | OUTPATIENT
Start: 2022-09-16

## 2022-09-16 RX ORDER — LIDOCAINE HYDROCHLORIDE 10 MG/ML
0.5 INJECTION, SOLUTION EPIDURAL; INFILTRATION; INTRACAUDAL; PERINEURAL
Status: SHIPPED | OUTPATIENT
Start: 2022-09-16

## 2022-09-16 RX ADMIN — TRIAMCINOLONE ACETONIDE 20 MG: 40 INJECTION, SUSPENSION INTRA-ARTICULAR; INTRAMUSCULAR at 09:45

## 2022-09-16 RX ADMIN — LIDOCAINE HYDROCHLORIDE 0.5 ML: 10 INJECTION, SOLUTION EPIDURAL; INFILTRATION; INTRACAUDAL; PERINEURAL at 09:45

## 2022-09-16 NOTE — PROGRESS NOTES
Small Joint Injection: R great MTP    Date/Time: 9/16/2022 9:45 AM  Performed by: Sterling Dia DO  Authorized by: Sterling Dia DO   Indications:  Pain  Needle Size:  25 G  Guidance: ultrasound     Approach:  Dorsal  Location:  Great toe    Site:  R great MTP                    Medications:  20 mg triamcinolone 40 MG/ML; 0.5 mL lidocaine (PF) 1 %          Procedure discussed: discussed risks, benefits, and alternatives    Consent Given by:  Patient  Timeout: timeout called immediately prior to procedure    Prep: patient was prepped and draped in usual sterile fashion

## 2022-09-16 NOTE — NURSING NOTE
[address] [address]  Dept: [phone]  ______________________________________________________________________________    Patient: Paul Roland   : 1955   MRN: [id number]   2022    INVASIVE PROCEDURE SAFETY CHECKLIST    Date: 2022   Procedure: Right great toe MTP joint cortisone injection with ultrasound guided injection  Patient Name: Paul Roland  MRN: [id number]  YOB: 1955    Action: Complete sections as appropriate. Any discrepancy results in a HARD COPY until resolved.     PRE PROCEDURE:  Patient ID verified with 2 identifiers (name and  or MRN): Yes  Procedure and site verified with patient/designee (when able): Yes  Accurate consent documentation in medical record: Yes  H&P (or appropriate assessment) documented in medical record: Yes  H&P must be up to 20 days prior to procedure and updates within 24 hours of procedure as applicable: NA  Relevant diagnostic and radiology test results appropriately labeled and displayed as applicable: Yes  Procedure site(s) marked with provider initials: NA    TIMEOUT:  Time-Out performed immediately prior to starting procedure, including verbal and active participation of all team members addressing the following:Yes  * Correct patient identify  * Confirmed that the correct side and site are marked  * An accurate procedure consent form  * Agreement on the procedure to be done  * Correct patient position  * Relevant images and results are properly labeled and appropriately displayed  * The need to administer antibiotics or fluids for irrigation purposes during the procedure as applicable   * Safety precautions based on patient history or medication use    DURING PROCEDURE: Verification of correct person, site, and procedures any time the responsibility for care of the patient is transferred to another member of the care team.       Prior to injection,  verified patient identity using patient's name and date of birth.  Due to injection administration, patient instructed to remain in clinic for 15 minutes  afterwards, and to report any adverse reaction to me immediately.    Joint injection was performed.      Drug Amount Wasted:  Yes: 4.5 mg/ml   Vial/Syringe: Single dose vial  Expiration Date:  06/01/2026      Lesley Del Toro, KAY  September 16, 2022

## 2022-09-16 NOTE — LETTER
2022      RE: Paul Roland  [address]  [address]  [place] [address]     Dear Colleague,    Thank you for referring your patient, Paul Roland, to the [facility] [place]. Please see a copy of my visit note below.        Small Joint Injection: R great MTP    Date/Time: 2022 9:45 AM  Performed by: Sterling Dia DO  Authorized by: Sterling Dia DO   Indications:  Pain  Needle Size:  25 G  Guidance: ultrasound     Approach:  Dorsal  Location:  Great toe    Site:  R great MTP                    Medications:  20 mg triamcinolone 40 MG/ML; 0.5 mL lidocaine (PF) 1 %          Procedure discussed: discussed risks, benefits, and alternatives    Consent Given by:  Patient  Timeout: timeout called immediately prior to procedure    Prep: patient was prepped and draped in usual sterile fashion              PROCEDURE ENCOUNTER    [facility]  Orthopedics  Sterling Dia DO  2022     Name: Paul Roland  MRN: [id number]  Age: 67 year old  : 1955    Diagnosis: (M20.21) Acquired hallux rigidus, right  (primary encounter diagnosis)    First Metatarsophalangeal joint Injection- Ultrasound Guided    The patient was informed of the risks and the benefits of the procedure and a written consent was signed.    The patient s right great toe was prepped with chlorhexidine in sterile fashion.     20 mg of kenalog suspension was drawn up into a 3 mL syringe with 0.5 mL of 1% lidocaine.    Injection was performed using sterile technique.  Under ultrasound guidance a 1.5-inch 25-gauge needle was used to enter the 1st MTP joint of the right great toe.  Needle placement was visualized and documented with ultrasound.  Needle placed in short axis to the probe.  Ultrasound visualization was necessary due to decreased joint space in the setting of osteoarthritis.  Images were permanently stored for the patient's record. There were no complications. The patient  tolerated the procedure well. There was negligible bleeding.    The patient was instructed to ice the toe upon leaving clinic and refrain from overuse over the next 3 days.     The patient was instructed to call or go to the emergency room with any unusual pain, swelling, redness, or if otherwise concerned.    Sterling Dia DO [facility]  Primary Care Sports Medicine  [facility]

## 2022-09-16 NOTE — PROGRESS NOTES
PROCEDURE ENCOUNTER    [facility]  Orthopedics  Sterling Dia DO  2022     Name: Paul Roland  MRN: [id number]  Age: 67 year old  : 1955    Diagnosis: (M20.21) Acquired hallux rigidus, right  (primary encounter diagnosis)    First Metatarsophalangeal joint Injection- Ultrasound Guided    The patient was informed of the risks and the benefits of the procedure and a written consent was signed.    The patient s right great toe was prepped with chlorhexidine in sterile fashion.     20 mg of kenalog suspension was drawn up into a 3 mL syringe with 0.5 mL of 1% lidocaine.    Injection was performed using sterile technique.  Under ultrasound guidance a 1.5-inch 25-gauge needle was used to enter the 1st MTP joint of the right great toe.  Needle placement was visualized and documented with ultrasound.  Needle placed in short axis to the probe.  Ultrasound visualization was necessary due to decreased joint space in the setting of osteoarthritis.  Images were permanently stored for the patient's record. There were no complications. The patient tolerated the procedure well. There was negligible bleeding.    The patient was instructed to ice the toe upon leaving clinic and refrain from overuse over the next 3 days.     The patient was instructed to call or go to the emergency room with any unusual pain, swelling, redness, or if otherwise concerned.    Sterling Dia DO [facility]  Primary Care Sports Medicine  [facility]

## 2022-09-19 ENCOUNTER — TELEPHONE (OUTPATIENT)
Dept: UROLOGY | Facility: CLINIC | Age: 67
End: 2022-09-19

## 2022-09-19 NOTE — TELEPHONE ENCOUNTER
[facility] Call Center    Phone Message    May a detailed message be left on voicemail: yes     Reason for Call: Medication Refill Request    Has the patient contacted the pharmacy for the refill? Yes   Name of medication being requested:  Trimix #4  Provider who prescribed the medication: Dr. Damon  Pharmacy: [address] [place] [id number]  Date medication is needed: ASAP   Pt is leaving 9/23 after his appointment and is wondering if he can get a med refill prior so he can have the medications while he is leaving out of town, please call pt to further discuss, thanks!      Action Taken: Message routed to:  [facility] Uro    Travel Screening: Not Applicable

## 2022-09-19 NOTE — TELEPHONE ENCOUNTER
FV Trimix #4: Trimix intracavernosal injection, per mL:   PGE1: 40 mcg   Papaverine: 27.6mg   Phentolamine: 1 mg      Inject 0.5mL intracavernosal as directed.    Last Written Prescription Date:  1/28/21  Last Fill Quantity: 10ml,   # refills: 0  Last Office Visit : 1/28/21  Future Office visit:  9/23/22    Routing refill request to provider for review/approval because:  Drug not active on patient's medication list

## 2022-09-19 NOTE — TELEPHONE ENCOUNTER
Spoke to pt. Pt confirmed that he would like this medication refilled prior to 9/23/22 appointment. He confirmed no changes with use of medication. Informed him that order will be refilled x 1, pt will need to attend visit in order to get additional refills. Pt verbalized understanding.      Spoke to pharmacy and verbally placed refill order x 1.     Carol Ramos, RN MSN

## 2022-09-21 ENCOUNTER — TRANSFERRED RECORDS (OUTPATIENT)
Dept: HEALTH INFORMATION MANAGEMENT | Facility: CLINIC | Age: 67
End: 2022-09-21

## 2022-09-23 ENCOUNTER — OFFICE VISIT (OUTPATIENT)
Dept: UROLOGY | Facility: CLINIC | Age: 67
End: 2022-09-23
Payer: COMMERCIAL

## 2022-09-23 VITALS
SYSTOLIC BLOOD PRESSURE: 146 MMHG | BODY MASS INDEX: 27.09 KG/M2 | HEIGHT: 72 IN | HEART RATE: 47 BPM | DIASTOLIC BLOOD PRESSURE: 90 MMHG | WEIGHT: 200 LBS

## 2022-09-23 DIAGNOSIS — N52.8 OTHER MALE ERECTILE DYSFUNCTION: ICD-10-CM

## 2022-09-23 DIAGNOSIS — N52.01 ERECTILE DYSFUNCTION DUE TO ARTERIAL INSUFFICIENCY: Primary | ICD-10-CM

## 2022-09-23 PROCEDURE — 99213 OFFICE O/P EST LOW 20 MIN: CPT | Performed by: UROLOGY

## 2022-09-23 ASSESSMENT — PAIN SCALES - GENERAL: PAINLEVEL: NO PAIN (0)

## 2022-09-23 NOTE — LETTER
Diagnosis codes    1. Erectile dysfunction N52.9      Procedure Codes (CPT codes) possible    1. Inflatable penile prothesis implantation. [id number]

## 2022-09-23 NOTE — LETTER
9/23/2022       RE: Paul Roland  [address]  [address]  [place] [address]     Dear Colleague,    Thank you for referring your patient, Paul Roland, to the [facility] [place] [facility]. Please see a copy of my visit note below.      Mr. Vogt is a 67 year old male who presents to clinic today for follow-up of ED medication refill.    HPI     Paul Roland is a 67 year old male here to follow-up ED.  ED symptoms started after colorectal surgery.    He uses Trimix #4.  This works so-so for him.    Discussed the option of IPP, and I gave him the insurance codes for this option.    Review of Systems   CONSTITUTIONAL: NEGATIVE for fever, chills, change in weight  ENT/MOUTH: NEGATIVE for ear, mouth and throat problems  RESP: NEGATIVE for significant cough or SOB  CV: NEGATIVE for chest pain, palpitations or peripheral edema    BP (!) 146/90   Pulse (!) 47   Ht 1.829 m (6')   Wt 90.7 kg (200 lb)   BMI 27.12 kg/m      General: Alert, oriented, nad  Eyes: anicteric, EOMI.  Pulse: regular  Resps: normal, non-labored.  Abdomen:  nondistended.   exam deferred.     Assessment & Plan     Erectile dysfunction, unspecified erectile dysfunction type.  Likely due to disruption of pelvic nerves from CR surgery.  - refilled trimix #4 and syringes.  - he will think about the surgery option.  - return to clinic 1yr, sooner if needed.    Eliceo Damon MD  [facility] [place]      Additional Coding Information:    Problems:  3 -- one stable chronic illness    Data Reviewed  N/A     Level of risk:  4 -- prescription drug management    Time spent:  15 minutes spent on the date of the encounter doing chart review, history and exam, documentation and further activities as noted above.

## 2022-09-23 NOTE — PROGRESS NOTES
Mr. Vogt is a 67 year old male who presents to clinic today for follow-up of ED medication refill.    HPI     Paul Roland is a 67 year old male here to follow-up ED.  ED symptoms started after colorectal surgery.    He uses Trimix #4.  This works so-so for him.    Discussed the option of IPP, and I gave him the insurance codes for this option.    Review of Systems   CONSTITUTIONAL: NEGATIVE for fever, chills, change in weight  ENT/MOUTH: NEGATIVE for ear, mouth and throat problems  RESP: NEGATIVE for significant cough or SOB  CV: NEGATIVE for chest pain, palpitations or peripheral edema    BP (!) 146/90   Pulse (!) 47   Ht 1.829 m (6')   Wt 90.7 kg (200 lb)   BMI 27.12 kg/m      General: Alert, oriented, nad  Eyes: anicteric, EOMI.  Pulse: regular  Resps: normal, non-labored.  Abdomen:  nondistended.   exam deferred.     Assessment & Plan     Erectile dysfunction, unspecified erectile dysfunction type.  Likely due to disruption of pelvic nerves from CR surgery.  - refilled trimix #4 and syringes.  - he will think about the surgery option.  - return to clinic 1yr, sooner if needed.    Eliceo Damon MD  [facility] [place]      Additional Coding Information:    Problems:  3 -- one stable chronic illness    Data Reviewed  N/A     Level of risk:  4 -- prescription drug management    Time spent:  15 minutes spent on the date of the encounter doing chart review, history and exam, documentation and further activities as noted above.

## 2022-09-23 NOTE — NURSING NOTE
"Chief Complaint   Patient presents with     Consult     Erectile dysfunction; Medication refill       Blood pressure (!) 146/90, pulse (!) 47, height 1.829 m (6'), weight 90.7 kg (200 lb). Body mass index is 27.12 kg/m .    Patient Active Problem List   Diagnosis     Hyperlipidemia     ED (erectile dysfunction)     Cigarette nicotine dependence without complication       No Known Allergies    Current Outpatient Medications   Medication Sig Dispense Refill     atorvastatin (LIPITOR) 20 MG tablet Take 1 tablet (20 mg) by mouth daily 100 tablet 3     bisacodyl (DULCOLAX) 5 MG EC tablet Take as directed. One day before exam take 2 tablets at 3 PM. Take 2 tablets at 11 PM. 4 tablet 0     diclofenac (VOLTAREN) 75 MG EC tablet Take 1 tablet (75 mg) by mouth 2 times daily as needed for moderate pain 60 tablet 0     diclofenac (VOLTAREN) 75 MG EC tablet Take 1 tablet (75 mg) by mouth 2 times daily 30 tablet 0     diclofenac (VOLTAREN) 75 MG EC tablet Take 1 tablet (75 mg) by mouth 2 times daily 40 tablet 0     diclofenac (VOLTAREN) 75 MG EC tablet Take 1 tablet (75 mg) by mouth 2 times daily as needed for moderate pain 20 tablet 0     insulin syringe-needle U-100 (BD INSULIN SYRINGE) 29G X 1/2\" 1 ML miscellaneous Use as directed 30 each prn     latanoprost (XALATAN) 0.005 % ophthalmic solution Place 1 drop into both eyes At Bedtime 7.5 mL 4     polyethylene glycol (GOLYTELY) 236 g suspension Take as directed. One day before exam fill the jug with water. Cover and shake until well mixed. At 6 PM start drinking an 8oz glass of mixture every 15 minutes until jug is 1/2 empty. Store remainder in the refrigerator.  At 11 PM Start drinking the other half of the Golytely jug. Drink one 8-ounce glass every 15 minutes until the jug is empty. 4000 mL 0       Social History     Tobacco Use     Smoking status: Current Every Day Smoker     Packs/day: 0.50     Types: Cigarettes     Smokeless tobacco: Never Used   Vaping Use     Vaping " Use: Never used   Substance Use Topics     Alcohol use: Yes     Comment: few drinks per day     Drug use: Never       Tomas Ayala, EMT  9/23/2022  12:04 PM

## 2022-10-03 ENCOUNTER — TELEPHONE (OUTPATIENT)
Dept: ORTHOPEDICS | Facility: CLINIC | Age: 67
End: 2022-10-03

## 2022-10-03 NOTE — TELEPHONE ENCOUNTER
[facility] Call Center    Phone Message    May a detailed message be left on voicemail: yes     Reason for Call: Medication Refill Request    Has the patient contacted the pharmacy for the refill? Yes   Name of medication being requested: diclofenac (VOLTAREN) 75 MG EC tablet  Provider who prescribed the medication: Dr. Sterling Dia   Pharmacy: [facility] [address]  Date medication is needed: 10/03/2022     Pt calling in for refill on medication.  Pt states he accidentally misplaced or possibly through medication away.  Asking for refill by end of today.     Pt can be reached at [phone]        Action Taken: Message routed to:  [facility] Sports Med  - Dr. Sterling Dia    Travel Screening: Not Applicable

## 2022-10-14 DIAGNOSIS — M20.21 ACQUIRED HALLUX RIGIDUS, RIGHT: ICD-10-CM

## 2022-10-16 ENCOUNTER — HEALTH MAINTENANCE LETTER (OUTPATIENT)
Age: 67
End: 2022-10-16

## 2022-10-19 RX ORDER — DICLOFENAC SODIUM 75 MG/1
75 TABLET, DELAYED RELEASE ORAL 2 TIMES DAILY PRN
Qty: 60 TABLET | Refills: 0 | Status: SHIPPED | OUTPATIENT
Start: 2022-10-19 | End: 2022-12-24

## 2022-10-19 NOTE — TELEPHONE ENCOUNTER
DICLOFENAC SOD EC 75 MG TAB  Last Written Prescription Date:  9/16/22  Last Fill Quantity: 60,   # refills: 0  Last Office Visit : 9/16/22  Future Office visit:  none    Routing refill request to provider for review/approval because:  AST/ ALT/CBC/ Cr due per refill protocol- lastBMP/ LFT 7/20/21

## 2022-12-21 DIAGNOSIS — M20.21 ACQUIRED HALLUX RIGIDUS, RIGHT: ICD-10-CM

## 2022-12-24 NOTE — TELEPHONE ENCOUNTER
DICLOFENAC SOD EC 75 MG TAB  Last Written Prescription Date:  10/19/2022  Last Fill Quantity: 60,   # refills: 0  Last Office Visit :  10/19/2022  Future Office visit:  60  Routing refill request to provider for review/approval because:  Abnormal B/P  Pt needing updated Labs: ALT/AST, Creatine, CBC  Refer to clinic for review       Shelbi Reyes RN  Central Triage Red Flags/Med Refills      BP Readings from Last 3 Encounters:   09/23/22 (!) 146/90   07/20/21 128/86   04/07/21 120/82           Normal ALT on file in past 12 months      Recent Labs   Lab Test 07/20/21  1502   ALT 37       Normal AST on file in past 12 months      Recent Labs   Lab Test 07/20/21  1502   AST 19       Patient is age 6-64 years      Normal CBC on file in past 12 months      Recent Labs   Lab Test 06/17/20  1442   WBC 4.3   RBC 4.84   HGB 14.8   HCT 45.0              Normal serum creatinine on file in past 12 months      Recent Labs   Lab Test 07/20/21  1502   CR 1.03

## 2022-12-27 RX ORDER — DICLOFENAC SODIUM 75 MG/1
75 TABLET, DELAYED RELEASE ORAL 2 TIMES DAILY PRN
Qty: 60 TABLET | Refills: 1 | Status: SHIPPED | OUTPATIENT
Start: 2022-12-27 | End: 2023-03-02

## 2023-01-06 ENCOUNTER — OFFICE VISIT (OUTPATIENT)
Dept: ORTHOPEDICS | Facility: CLINIC | Age: 68
End: 2023-01-06
Payer: COMMERCIAL

## 2023-01-06 DIAGNOSIS — M20.21 ACQUIRED HALLUX RIGIDUS, RIGHT: Primary | ICD-10-CM

## 2023-01-06 PROCEDURE — 20604 DRAIN/INJ JOINT/BURSA W/US: CPT | Mod: RT | Performed by: FAMILY MEDICINE

## 2023-01-06 PROCEDURE — 99207 PR DROP WITH A PROCEDURE: CPT | Performed by: FAMILY MEDICINE

## 2023-01-06 RX ORDER — TRIAMCINOLONE ACETONIDE 40 MG/ML
20 INJECTION, SUSPENSION INTRA-ARTICULAR; INTRAMUSCULAR
Status: SHIPPED | OUTPATIENT
Start: 2023-01-06

## 2023-01-06 RX ORDER — LIDOCAINE HYDROCHLORIDE 10 MG/ML
0.5 INJECTION, SOLUTION EPIDURAL; INFILTRATION; INTRACAUDAL; PERINEURAL
Status: SHIPPED | OUTPATIENT
Start: 2023-01-06

## 2023-01-06 RX ADMIN — TRIAMCINOLONE ACETONIDE 20 MG: 40 INJECTION, SUSPENSION INTRA-ARTICULAR; INTRAMUSCULAR at 11:49

## 2023-01-06 RX ADMIN — LIDOCAINE HYDROCHLORIDE 0.5 ML: 10 INJECTION, SOLUTION EPIDURAL; INFILTRATION; INTRACAUDAL; PERINEURAL at 11:49

## 2023-01-06 NOTE — PROGRESS NOTES
Small Joint Injection/Arthrocentesis: R great MTP    Date/Time: 1/6/2023 11:49 AM  Performed by: Sterling Dia DO  Authorized by: Sterling Dia DO   Indications:  Pain  Needle Size:  25 G  Guidance: ultrasound     Approach:  Dorsal  Location:  Great toe    Site:  R great MTP                    Medications:  20 mg triamcinolone 40 MG/ML; 0.5 mL lidocaine (PF) 1 %        Outcome:  Tolerated well, no immediate complications  Procedure discussed: discussed risks, benefits, and alternatives    Consent Given by:  Patient  Timeout: timeout called immediately prior to procedure    Prep: patient was prepped and draped in usual sterile fashion

## 2023-01-06 NOTE — LETTER
2023      RE: Paul Roland  [address]  [address]  [place] [address]     Dear Colleague,    Thank you for referring your patient, Paul Roland, to the [facility] [place]. Please see a copy of my visit note below.    PROCEDURE ENCOUNTER    [facility]  Orthopedics  Sterling Dia DO  2023     Name: Paul Roland  MRN: [id number]  Age: 67 year old  : 1955    Referring provider: junior  Diagnosis: Hallux rigidus of right great toe    First Metatarsophalangeal joint Injection- Ultrasound Guided    The patient was informed of the risks and the benefits of the procedure and a written consent was signed.    The patient s right great toe was prepped with chlorhexidine in sterile fashion.     20 mg of kenalog suspension was drawn up into a 3 mL syringe with 0.5 mL of 1% lidocaine.    Injection was performed using sterile technique.  Under ultrasound guidance a 1.5-inch 25-gauge needle was used to enter the 1st MTP joint of the right great toe.  Needle placement was visualized and documented with ultrasound.  Needle placed in short axis to the probe.  Ultrasound visualization was necessary due to decreased joint space in the setting of osteoarthritis.  Images were permanently stored for the patient's record. There were no complications. The patient tolerated the procedure well. There was negligible bleeding.    The patient was instructed to ice the toe upon leaving clinic and refrain from overuse over the next 3 days.     The patient was instructed to call or go to the emergency room with any unusual pain, swelling, redness, or if otherwise concerned.    Sterling Dia DO [facility]  Primary Care Sports Medicine  [facility]      Small Joint Injection/Arthrocentesis: R great MTP    Date/Time: 2023 11:49 AM  Performed by: Sterling Dia DO  Authorized by: Sterling Dia DO   Indications:  Pain  Needle Size:  25 G  Guidance:  ultrasound     Approach:  Dorsal  Location:  Great toe    Site:  R great MTP                 Medications:  20 mg triamcinolone 40 MG/ML; 0.5 mL lidocaine (PF) 1 %        Outcome:  Tolerated well, no immediate complications  Procedure discussed: discussed risks, benefits, and alternatives    Consent Given by:  Patient  Timeout: timeout called immediately prior to procedure    Prep: patient was prepped and draped in usual sterile fashion          Again, thank you for allowing me to participate in the care of your patient.      Sincerely,    Sterling Dia DO

## 2023-01-06 NOTE — NURSING NOTE
[address] [address]  Dept: [phone]  ______________________________________________________________________________    Patient: Paul Roland   : 1955   MRN: [id number]   2023    INVASIVE PROCEDURE SAFETY CHECKLIST    Date: 2023   Procedure: Right great MTP joint injection   Patient Name: Paul Roland  MRN: [id number]  YOB: 1955    Action: Complete sections as appropriate. Any discrepancy results in a HARD COPY until resolved.     PRE PROCEDURE:  Patient ID verified with 2 identifiers (name and  or MRN): Yes  Procedure and site verified with patient/designee (when able): Yes  Accurate consent documentation in medical record: Yes  H&P (or appropriate assessment) documented in medical record: Yes  H&P must be up to 20 days prior to procedure and updates within 24 hours of procedure as applicable: NA  Relevant diagnostic and radiology test results appropriately labeled and displayed as applicable: Yes  Procedure site(s) marked with provider initials: NA    TIMEOUT:  Time-Out performed immediately prior to starting procedure, including verbal and active participation of all team members addressing the following:Yes  * Correct patient identify  * Confirmed that the correct side and site are marked  * An accurate procedure consent form  * Agreement on the procedure to be done  * Correct patient position  * Relevant images and results are properly labeled and appropriately displayed  * The need to administer antibiotics or fluids for irrigation purposes during the procedure as applicable   * Safety precautions based on patient history or medication use    DURING PROCEDURE: Verification of correct person, site, and procedures any time the responsibility for care of the patient is transferred to another member of the care team.       Prior to injection, verified patient identity using patient's name and  date of birth.  Due to injection administration, patient instructed to remain in clinic for 15 minutes  afterwards, and to report any adverse reaction to me immediately.    Joint injection was performed.      Drug Amount Wasted:  Yes: 4.5 mg/ml   Vial/Syringe: Single dose vial  Expiration Date:  08/26      Gracie Bolivar [facility]  January 6, 2023

## 2023-01-06 NOTE — PROGRESS NOTES
PROCEDURE ENCOUNTER    [facility]  Orthopedics  Sterling Dia DO  2023     Name: Paul Roland  MRN: [id number]  Age: 67 year old  : 1955    Referring provider: junior  Diagnosis: Hallux rigidus of right great toe    First Metatarsophalangeal joint Injection- Ultrasound Guided    The patient was informed of the risks and the benefits of the procedure and a written consent was signed.    The patient s right great toe was prepped with chlorhexidine in sterile fashion.     20 mg of kenalog suspension was drawn up into a 3 mL syringe with 0.5 mL of 1% lidocaine.    Injection was performed using sterile technique.  Under ultrasound guidance a 1.5-inch 25-gauge needle was used to enter the 1st MTP joint of the right great toe.  Needle placement was visualized and documented with ultrasound.  Needle placed in short axis to the probe.  Ultrasound visualization was necessary due to decreased joint space in the setting of osteoarthritis.  Images were permanently stored for the patient's record. There were no complications. The patient tolerated the procedure well. There was negligible bleeding.    The patient was instructed to ice the toe upon leaving clinic and refrain from overuse over the next 3 days.     The patient was instructed to call or go to the emergency room with any unusual pain, swelling, redness, or if otherwise concerned.    Sterling Dia DO CAM  Primary Care Sports Medicine  [facility]

## 2023-02-27 DIAGNOSIS — M20.21 ACQUIRED HALLUX RIGIDUS, RIGHT: ICD-10-CM

## 2023-03-02 RX ORDER — DICLOFENAC SODIUM 75 MG/1
75 TABLET, DELAYED RELEASE ORAL 2 TIMES DAILY PRN
Qty: 60 TABLET | Refills: 1 | Status: SHIPPED | OUTPATIENT
Start: 2023-03-02 | End: 2023-05-04

## 2023-03-02 NOTE — TELEPHONE ENCOUNTER
DICLOFENAC SOD EC 75 MG TAB  Last Written Prescription Date:   12/27/2022  Last Fill Quantity: 60,   # refills: 1  Last Office Visit :  1/6/2023  Future Office visit:  None    Routing refill request to provider for review/approval because:  Drug not on the [facility], [facility] or [facility] refill protocol or controlled substance      Shelbi Reyes RN  Central Triage Red Flags/Med Refills

## 2023-03-20 ENCOUNTER — TELEPHONE (OUTPATIENT)
Dept: SURGERY | Facility: CLINIC | Age: 68
End: 2023-03-20
Payer: COMMERCIAL

## 2023-03-20 NOTE — TELEPHONE ENCOUNTER
Paul has a history of rectal cancer. Last colonoscopy Sept 2022 by  was normal. A couple days ago he had a large bowel movement. He started having pain with this BM. He also had rectal bleeding that dripped down his leg. Eval for hemorrhoids vs fissure.

## 2023-03-20 NOTE — TELEPHONE ENCOUNTER
[facility] Call Center    Phone Message    May a detailed message be left on voicemail: yes     Reason for Call: Other:     Pt called to schedule an appt in regards to rectal pain. Per protocols please assess and call pt to discuss scheduling. Thank you.    Action Taken: Message routed to:  [facility] ([facility] GUME ALICIA    Travel Screening: Not Applicable

## 2023-03-21 ENCOUNTER — OFFICE VISIT (OUTPATIENT)
Dept: SURGERY | Facility: CLINIC | Age: 68
End: 2023-03-21
Payer: COMMERCIAL

## 2023-03-21 ENCOUNTER — PRE VISIT (OUTPATIENT)
Dept: SURGERY | Facility: CLINIC | Age: 68
End: 2023-03-21

## 2023-03-21 VITALS
OXYGEN SATURATION: 98 % | HEIGHT: 72 IN | BODY MASS INDEX: 27.04 KG/M2 | WEIGHT: 199.6 LBS | SYSTOLIC BLOOD PRESSURE: 151 MMHG | HEART RATE: 88 BPM | DIASTOLIC BLOOD PRESSURE: 98 MMHG

## 2023-03-21 DIAGNOSIS — Z85.048 HISTORY OF RECTAL CANCER: Primary | ICD-10-CM

## 2023-03-21 DIAGNOSIS — K62.5 RECTAL BLEEDING: ICD-10-CM

## 2023-03-21 PROCEDURE — 99203 OFFICE O/P NEW LOW 30 MIN: CPT | Performed by: NURSE PRACTITIONER

## 2023-03-21 NOTE — LETTER
3/21/2023       RE: Paul Roland  [address]  [address]  [place] [address]     Dear Colleague,    Thank you for referring your patient, Paul Roland, to the [facility] [place] [facility]. Please see a copy of my visit note below.    Colon and Rectal Surgery Follow-Up Clinic Note    RE: Paul Roland  : 1955  TAMY: 3/21/2023    Paul Roland is a very pleasant 68 year old male with T2N0 rectal cancer history status post low anterior resection in 2017 with Dr. Behm.  According to the operative report, his anastomosis is approximately 1 cm above the dentate line. Colonoscopy with Dr. Judd on 22 was normal.    Interval history: Paul had a hard bowel movement about 1 week ago and he had bright red blood with this.  No associated anal pain but he did have some low abdominal pain.  He no longer has any bleeding and never recurred.  He was very worried by this and wanted to get this checked out today.  He does tend to get on the constipated side.  He takes a fiber supplement daily but still sometimes has some harder stools.    Physical Examination: Exam was chaperoned by Chacorta Sosa, EMT-P   BP (!) 151/98 (BP Location: Left arm, Patient Position: Sitting, Cuff Size: Adult Regular)   Pulse 88   Ht 6'   Wt 199 lb 9.6 oz   SpO2 98%   BMI 27.07 kg/m    General: Alert, oriented, in no acute distress, sitting comfortably  HEENT: Mucous membranes moist  Perianal external examination:  Perianal skin: Intact with no excoriation or lichenification.  Lesions: No evidence of an external lesion, nodularity, or induration in the perianal region.  Eversion of buttocks: There was not evidence of an anal fissure. Details: N/A.  Skin tags or external hemorrhoids: None.    Digital rectal examination: Was performed.   Palpable anastomosis that is wide open and without any nodularity.  He had quite a bit of  stool in his anal canal on exam.  No bleeding.    Anoscopy: Was deferred.    Assessment/Plan: 68 year old male with history of rectal cancer status post low anterior resection in 2017.  Normal colonoscopy in September 2022 but had bleeding with a hard bowel movement 1 week ago.  The bleeding has not recurred.  I discussed that this was likely due to the hard stool.  Continue on a daily fiber supplement and try adding MiraLAX and daily as he does tend to be constipated.  I deferred anoscopy today as I think the connection may be slightly higher than I would be able to see on anoscopy and he had quite a bit of stool in the anal canal.  Since he has not had any further bleeding, I did not feel this was necessary today.  If bleeding returns, I would like to get a flexible sigmoidoscopy and asked him to contact the clinic if this does recur. Patient's questions were answered to his stated satisfaction and he is in agreement with this plan.     Medical history:  Past Medical History:   Diagnosis Date     BPH (benign prostatic hyperplasia)      ED (erectile dysfunction)     injection therapy     Hyperlipidemia      Rectal cancer (H) 08/10/2017    Clinical: Stage I (T2, N0, M0)        Surgical history:  Past Surgical History:   Procedure Laterality Date     RECTAL SURGERY  08/16/2017    with diverting loop ileostomy      TURP         Problem list:  Patient Active Problem List    Diagnosis Date Noted     Cigarette nicotine dependence without complication 07/20/2021     Priority: Medium     Hyperlipidemia      Priority: Medium     ED (erectile dysfunction)      Priority: Medium     injection therapy         Medications:  Current Outpatient Medications   Medication Sig Dispense Refill     atorvastatin (LIPITOR) 20 MG tablet Take 1 tablet (20 mg) by mouth daily 100 tablet 3     bisacodyl (DULCOLAX) 5 MG EC tablet Take as directed. One day before exam take 2 tablets at 3 PM. Take 2 tablets at 11 PM. 4 tablet 0     diclofenac  "(VOLTAREN) 75 MG EC tablet Take 1 tablet (75 mg) by mouth 2 times daily as needed for moderate pain 20 tablet 0     COMPOUNDED NON-CONTROLLED SUBSTANCE (CMPD RX) - PHARMACY TO MIX COMPOUNDED MEDICATION Trimix intracavernosal injection, per mL: PGE1: 40 mcg, Papaverine: 27.6mg, Phentolamine: 1 mg. Sig: Inject 0.3mL intracavernosal as directed. Max use one daily and up to 3x/week. May titrate up in 0.1mL increments as needed.  Use lowest effective dose. 5 mL 11     diclofenac (VOLTAREN) 75 MG EC tablet Take 1 tablet (75 mg) by mouth 2 times daily as needed for moderate pain (4-6) 60 tablet 1     diclofenac (VOLTAREN) 75 MG EC tablet Take 1 tablet (75 mg) by mouth 2 times daily 30 tablet 0     diclofenac (VOLTAREN) 75 MG EC tablet Take 1 tablet (75 mg) by mouth 2 times daily 40 tablet 0     insulin syringe-needle U-100 (BD INSULIN SYRINGE) 29G X 1/2\" 1 ML miscellaneous Use as directed 30 each prn     latanoprost (XALATAN) 0.005 % ophthalmic solution Place 1 drop into both eyes At Bedtime 7.5 mL 4     polyethylene glycol (GOLYTELY) 236 g suspension Take as directed. One day before exam fill the jug with water. Cover and shake until well mixed. At 6 PM start drinking an 8oz glass of mixture every 15 minutes until jug is 1/2 empty. Store remainder in the refrigerator.  At 11 PM Start drinking the other half of the Golytely jug. Drink one 8-ounce glass every 15 minutes until the jug is empty. 4000 mL 0       Allergies:  No Known Allergies    Family history:  Family History   Problem Relation Age of Onset     Liver Cancer Mother        Social history:  Social History     Tobacco Use     Smoking status: Every Day     Packs/day: 0.50     Types: Cigarettes     Smokeless tobacco: Never   Substance Use Topics     Alcohol use: Yes     Comment: few drinks per day     Marital status: single.    There are no exam notes on file for this visit.     15 minutes spent on the date of encounter (excluding time performing procedures) " performing chart review, history and exam, documentation and further activities as noted above.    Makenna Levine NP-C  Colon and Rectal Surgery  [facility]

## 2023-03-21 NOTE — PROGRESS NOTES
Colon and Rectal Surgery Follow-Up Clinic Note    RE: Paul Roland  : 1955  TAMY: 3/21/2023    Paul Roland is a very pleasant 68 year old male with T2N0 rectal cancer history status post low anterior resection in  with Dr. Behm.  According to the operative report, his anastomosis is approximately 1 cm above the dentate line. Colonoscopy with Dr. Judd on 22 was normal.    Interval history: Paul had a hard bowel movement about 1 week ago and he had bright red blood with this.  No associated anal pain but he did have some low abdominal pain.  He no longer has any bleeding and never recurred.  He was very worried by this and wanted to get this checked out today.  He does tend to get on the constipated side.  He takes a fiber supplement daily but still sometimes has some harder stools.    Physical Examination: Exam was chaperoned by Chacorta Sosa, EMT-P   BP (!) 151/98 (BP Location: Left arm, Patient Position: Sitting, Cuff Size: Adult Regular)   Pulse 88   Ht 6'   Wt 199 lb 9.6 oz   SpO2 98%   BMI 27.07 kg/m    General: Alert, oriented, in no acute distress, sitting comfortably  HEENT: Mucous membranes moist  Perianal external examination:  Perianal skin: Intact with no excoriation or lichenification.  Lesions: No evidence of an external lesion, nodularity, or induration in the perianal region.  Eversion of buttocks: There was not evidence of an anal fissure. Details: N/A.  Skin tags or external hemorrhoids: None.    Digital rectal examination: Was performed.   Palpable anastomosis that is wide open and without any nodularity.  He had quite a bit of stool in his anal canal on exam.  No bleeding.    Anoscopy: Was deferred.    Assessment/Plan: 68 year old male with history of rectal cancer status post low anterior resection in .  Normal colonoscopy in 2022 but had bleeding with a hard bowel movement 1 week ago.  The bleeding has not recurred.  I discussed that this was likely  due to the hard stool.  Continue on a daily fiber supplement and try adding MiraLAX and daily as he does tend to be constipated.  I deferred anoscopy today as I think the connection may be slightly higher than I would be able to see on anoscopy and he had quite a bit of stool in the anal canal.  Since he has not had any further bleeding, I did not feel this was necessary today.  If bleeding returns, I would like to get a flexible sigmoidoscopy and asked him to contact the clinic if this does recur. Patient's questions were answered to his stated satisfaction and he is in agreement with this plan.     Medical history:  Past Medical History:   Diagnosis Date     BPH (benign prostatic hyperplasia)      ED (erectile dysfunction)     injection therapy     Hyperlipidemia      Rectal cancer (H) 08/10/2017    Clinical: Stage I (T2, N0, M0)        Surgical history:  Past Surgical History:   Procedure Laterality Date     RECTAL SURGERY  08/16/2017    with diverting loop ileostomy      TURP         Problem list:  Patient Active Problem List    Diagnosis Date Noted     Cigarette nicotine dependence without complication 07/20/2021     Priority: Medium     Hyperlipidemia      Priority: Medium     ED (erectile dysfunction)      Priority: Medium     injection therapy         Medications:  Current Outpatient Medications   Medication Sig Dispense Refill     atorvastatin (LIPITOR) 20 MG tablet Take 1 tablet (20 mg) by mouth daily 100 tablet 3     bisacodyl (DULCOLAX) 5 MG EC tablet Take as directed. One day before exam take 2 tablets at 3 PM. Take 2 tablets at 11 PM. 4 tablet 0     diclofenac (VOLTAREN) 75 MG EC tablet Take 1 tablet (75 mg) by mouth 2 times daily as needed for moderate pain 20 tablet 0     COMPOUNDED NON-CONTROLLED SUBSTANCE (CMPD RX) - PHARMACY TO MIX COMPOUNDED MEDICATION Trimix intracavernosal injection, per mL: PGE1: 40 mcg, Papaverine: 27.6mg, Phentolamine: 1 mg. Sig: Inject 0.3mL intracavernosal as directed. Max  "use one daily and up to 3x/week. May titrate up in 0.1mL increments as needed.  Use lowest effective dose. 5 mL 11     diclofenac (VOLTAREN) 75 MG EC tablet Take 1 tablet (75 mg) by mouth 2 times daily as needed for moderate pain (4-6) 60 tablet 1     diclofenac (VOLTAREN) 75 MG EC tablet Take 1 tablet (75 mg) by mouth 2 times daily 30 tablet 0     diclofenac (VOLTAREN) 75 MG EC tablet Take 1 tablet (75 mg) by mouth 2 times daily 40 tablet 0     insulin syringe-needle U-100 (BD INSULIN SYRINGE) 29G X 1/2\" 1 ML miscellaneous Use as directed 30 each prn     latanoprost (XALATAN) 0.005 % ophthalmic solution Place 1 drop into both eyes At Bedtime 7.5 mL 4     polyethylene glycol (GOLYTELY) 236 g suspension Take as directed. One day before exam fill the jug with water. Cover and shake until well mixed. At 6 PM start drinking an 8oz glass of mixture every 15 minutes until jug is 1/2 empty. Store remainder in the refrigerator.  At 11 PM Start drinking the other half of the Golytely jug. Drink one 8-ounce glass every 15 minutes until the jug is empty. 4000 mL 0       Allergies:  No Known Allergies    Family history:  Family History   Problem Relation Age of Onset     Liver Cancer Mother        Social history:  Social History     Tobacco Use     Smoking status: Every Day     Packs/day: 0.50     Types: Cigarettes     Smokeless tobacco: Never   Substance Use Topics     Alcohol use: Yes     Comment: few drinks per day     Marital status: single.    There are no exam notes on file for this visit.     15 minutes spent on the date of encounter (excluding time performing procedures) performing chart review, history and exam, documentation and further activities as noted above.    Makenna Levine NP-C  Colon and Rectal Surgery  [facility]    "

## 2023-03-21 NOTE — TELEPHONE ENCOUNTER
Diagnosis, Referred by & from: Rectal Pain, fissure or hemorrhoids, hx rectal cancer   Appt date: 3/21/2023   NOTES STATUS DETAILS   OFFICE NOTE from referring provider N/A    OFFICE NOTE from other specialist Care Everywhere / [facility]:  9/23/22 - URO OV with Dr. Damon  [facility]    [facility]:  7/2/18 - URO OV with Dr. Aponte  11/30/17 - GEN SURG OV with Dr. Behm   DISCHARGE SUMMARY from hospital Care Everywhere [facility]:  8/16/17 - Admission with Dr. Behm   DISCHARGE REPORT from the ER N/A    OPERATIVE REPORT Care Everywhere [facility]:  11/17/17 - OP Note for TAKEDOWN ILEOSTOMY with Dr. Behm  8/16/17 - OP Note LAPAROSCOPIC LOW ANTERIOR RESECTION WITH DIVERTING LOOP ILEOSTOMY, COLONOSCOPY with Dr. Behm   MEDICATION LIST Internal    LABS     ANAL PAP/CEA [facility]:  6/17/20 - CEA   BIOPSIES/PATHOLOGY RELATED TO DIAGNOSIS Care Everywhere / [facility]:  12/11/20 - Colon Biopsy (Case: [id number])  10/25/19 - Colon Biopsy (Case: [id number])    [facility]:  10/24/18 - Colon Biopsy (Case: [id number])   DIAGNOSTIC PROCEDURES     COLONOSCOPY Care Everywhere / Received [facility]:  9/21/22 - Colonoscopy  12/11/20 - Colonoscopy  10/25/19 - Colonoscopy    [facility]:  10/24/18 - Colnonscopy   IMAGING (DISC & REPORT)      CT [facility]:  6/10/20 - CT Chest/Abd/Pelvis  6/20/19 - CT Chest/Abd/Pelvis

## 2023-05-01 DIAGNOSIS — M20.21 ACQUIRED HALLUX RIGIDUS, RIGHT: ICD-10-CM

## 2023-05-04 RX ORDER — DICLOFENAC SODIUM 75 MG/1
75 TABLET, DELAYED RELEASE ORAL 2 TIMES DAILY PRN
Qty: 60 TABLET | Refills: 1 | Status: SHIPPED | OUTPATIENT
Start: 2023-05-04 | End: 2023-07-25

## 2023-05-04 NOTE — TELEPHONE ENCOUNTER
diclofenac (VOLTAREN) 75 MG EC tablet     Last Written Prescription Date:  3/2/23  Last Fill Quantity: 60,   # refills: 1  Last Office Visit :1/6/23  Future Office visit: 5/12/23    Routing refill request to provider for review/approval because:    NSAID Medications Failed 05/01/2023 01:10 AM   Protocol Details  Blood pressure under 140/90 in past 12 months    Normal ALT on file in past 12 months    Normal AST on file in past 12 months    Patient is age 6-64 years    Normal CBC on file in past 12 months    Normal serum creatinine on file in past 12 months

## 2023-05-12 ENCOUNTER — OFFICE VISIT (OUTPATIENT)
Dept: ORTHOPEDICS | Facility: CLINIC | Age: 68
End: 2023-05-12
Payer: COMMERCIAL

## 2023-05-12 VITALS — HEIGHT: 72 IN | BODY MASS INDEX: 26.95 KG/M2 | WEIGHT: 199 LBS

## 2023-05-12 DIAGNOSIS — M20.21 ACQUIRED HALLUX RIGIDUS, RIGHT: Primary | ICD-10-CM

## 2023-05-12 PROCEDURE — 20604 DRAIN/INJ JOINT/BURSA W/US: CPT | Mod: RT | Performed by: FAMILY MEDICINE

## 2023-05-12 PROCEDURE — 99207 PR DROP WITH A PROCEDURE: CPT | Performed by: FAMILY MEDICINE

## 2023-05-12 RX ORDER — LIDOCAINE HYDROCHLORIDE 10 MG/ML
0.5 INJECTION, SOLUTION EPIDURAL; INFILTRATION; INTRACAUDAL; PERINEURAL
Status: SHIPPED | OUTPATIENT
Start: 2023-05-12

## 2023-05-12 RX ORDER — TRIAMCINOLONE ACETONIDE 40 MG/ML
20 INJECTION, SUSPENSION INTRA-ARTICULAR; INTRAMUSCULAR
Status: SHIPPED | OUTPATIENT
Start: 2023-05-12

## 2023-05-12 RX ADMIN — LIDOCAINE HYDROCHLORIDE 0.5 ML: 10 INJECTION, SOLUTION EPIDURAL; INFILTRATION; INTRACAUDAL; PERINEURAL at 14:42

## 2023-05-12 RX ADMIN — TRIAMCINOLONE ACETONIDE 20 MG: 40 INJECTION, SUSPENSION INTRA-ARTICULAR; INTRAMUSCULAR at 14:42

## 2023-05-12 NOTE — PROGRESS NOTES
PROCEDURE ENCOUNTER    [facility]  Orthopedics  Sterling Dia DO  May 12, 2023     Name: Paul Roland  MRN: [id number]  Age: 68 year old  : 1955    Referring provider: HEMANT  Diagnosis: Hallux rigidus of right great toe     First Metatarsophalangeal Joint Injection- Ultrasound Guided     The patient was informed of the risks and the benefits of the procedure and a written consent was signed.     The patient s right great toe was prepped with chlorhexidine in sterile fashion.      20 mg of kenalog suspension was drawn up into a 3 mL syringe with 0.5 mL of 1% lidocaine.     Injection was performed using sterile technique.  Under ultrasound guidance a 1.5-inch 25-gauge needle was used to enter the 1st MTP joint of the right great toe.  Needle placement was visualized and documented with ultrasound.  Needle placed in short axis to the probe.  Ultrasound visualization was necessary due to decreased joint space in the setting of osteoarthritis.  Images were permanently stored for the patient's record. There were no complications. The patient tolerated the procedure well. There was negligible bleeding.     The patient was instructed to ice the toe upon leaving clinic and refrain from overuse over the next 3 days.      The patient was instructed to call or go to the emergency room with any unusual pain, swelling, redness, or if otherwise concerned.     Sterling Dia DO CAM  Primary Care Sports Medicine  [facility]

## 2023-05-12 NOTE — NURSING NOTE
[address] [address]  Dept: [phone]  ______________________________________________________________________________    Patient: Paul Roland   : 1955   MRN: [id number]   May 12, 2023    INVASIVE PROCEDURE SAFETY CHECKLIST    Date: 23   Procedure: Right great toe 1st MTP kenalog injection  Patient Name: Paul Roland  MRN: [id number]  YOB: 1955    Action: Complete sections as appropriate. Any discrepancy results in a HARD COPY until resolved.     PRE PROCEDURE:  Patient ID verified with 2 identifiers (name and  or MRN): Yes  Procedure and site verified with patient/designee (when able): Yes  Accurate consent documentation in medical record: Yes  H&P (or appropriate assessment) documented in medical record: Yes  H&P must be up to 20 days prior to procedure and updates within 24 hours of procedure as applicable: NA  Relevant diagnostic and radiology test results appropriately labeled and displayed as applicable: Yes  Procedure site(s) marked with provider initials: NA    TIMEOUT:  Time-Out performed immediately prior to starting procedure, including verbal and active participation of all team members addressing the following:Yes  * Correct patient identify  * Confirmed that the correct side and site are marked  * An accurate procedure consent form  * Agreement on the procedure to be done  * Correct patient position  * Relevant images and results are properly labeled and appropriately displayed  * The need to administer antibiotics or fluids for irrigation purposes during the procedure as applicable   * Safety precautions based on patient history or medication use    DURING PROCEDURE: Verification of correct person, site, and procedures any time the responsibility for care of the patient is transferred to another member of the care team.       Prior to injection, verified patient identity using patient's name and  date of birth.  Due to injection administration, patient instructed to remain in clinic for 15 minutes  afterwards, and to report any adverse reaction to me immediately.    Joint injection was performed.      Drug Amount Wasted:  Yes: 5 mg/ml   Vial/Syringe: Single dose vial  Expiration Date:  12/2026;01/2025      Aileen Nieto ATC  May 12, 2023

## 2023-05-12 NOTE — LETTER
2023      RE: Paul Roland  [address]  [address]  [place] [address]     Dear Colleague,    Thank you for referring your patient, Paul Roland, to the [facility] [place]. Please see a copy of my visit note below.          Small Joint Injection: R great MTP    Date/Time: 2023 2:42 PM    Performed by: Sterling Dia DO  Authorized by: Sterling Dia DO  Indications:  Pain  Needle Size:  25 G  Guidance: ultrasound     Approach:  Dorsal  Location:  Great toe    Site:  R great MTP                    Medications:  20 mg triamcinolone 40 MG/ML; 0.5 mL lidocaine (PF) 1 %        Outcome:  Tolerated well, no immediate complications  Procedure discussed: discussed risks, benefits, and alternatives    Consent Given by:  Patient  Timeout: timeout called immediately prior to procedure    Prep: patient was prepped and draped in usual sterile fashion              PROCEDURE ENCOUNTER    [facility]  Orthopedics  Sterling Dia DO  May 12, 2023     Name: Paul Roland  MRN: [id number]  Age: 68 year old  : 1955    Referring provider: HEMANT  Diagnosis: Hallux rigidus of right great toe     First Metatarsophalangeal Joint Injection- Ultrasound Guided     The patient was informed of the risks and the benefits of the procedure and a written consent was signed.     The patient s right great toe was prepped with chlorhexidine in sterile fashion.      20 mg of kenalog suspension was drawn up into a 3 mL syringe with 0.5 mL of 1% lidocaine.     Injection was performed using sterile technique.  Under ultrasound guidance a 1.5-inch 25-gauge needle was used to enter the 1st MTP joint of the right great toe.  Needle placement was visualized and documented with ultrasound.  Needle placed in short axis to the probe.  Ultrasound visualization was necessary due to decreased joint space in the setting of osteoarthritis.  Images were permanently stored for the patient's record.  There were no complications. The patient tolerated the procedure well. There was negligible bleeding.     The patient was instructed to ice the toe upon leaving clinic and refrain from overuse over the next 3 days.      The patient was instructed to call or go to the emergency room with any unusual pain, swelling, redness, or if otherwise concerned.     Sterling Dia DO [facility]  Primary Care Sports Medicine  [facility]

## 2023-05-12 NOTE — PROGRESS NOTES
Small Joint Injection: R great MTP    Date/Time: 5/12/2023 2:42 PM    Performed by: Sterling Dia DO  Authorized by: Sterling Dia DO  Indications:  Pain  Needle Size:  25 G  Guidance: ultrasound     Approach:  Dorsal  Location:  Great toe    Site:  R great MTP                    Medications:  20 mg triamcinolone 40 MG/ML; 0.5 mL lidocaine (PF) 1 %        Outcome:  Tolerated well, no immediate complications  Procedure discussed: discussed risks, benefits, and alternatives    Consent Given by:  Patient  Timeout: timeout called immediately prior to procedure    Prep: patient was prepped and draped in usual sterile fashion

## 2023-05-30 ENCOUNTER — OFFICE VISIT (OUTPATIENT)
Dept: OPHTHALMOLOGY | Facility: CLINIC | Age: 68
End: 2023-05-30
Attending: OPHTHALMOLOGY
Payer: COMMERCIAL

## 2023-05-30 DIAGNOSIS — H40.003 GLAUCOMA SUSPECT OF BOTH EYES: ICD-10-CM

## 2023-05-30 DIAGNOSIS — H40.003 GLAUCOMA SUSPECT OF BOTH EYES: Primary | ICD-10-CM

## 2023-05-30 DIAGNOSIS — H40.1132 PRIMARY OPEN ANGLE GLAUCOMA (POAG) OF BOTH EYES, MODERATE STAGE: Primary | ICD-10-CM

## 2023-05-30 PROCEDURE — 92083 EXTENDED VISUAL FIELD XM: CPT | Performed by: OPHTHALMOLOGY

## 2023-05-30 PROCEDURE — G0463 HOSPITAL OUTPT CLINIC VISIT: HCPCS | Performed by: OPHTHALMOLOGY

## 2023-05-30 PROCEDURE — 92133 CPTRZD OPH DX IMG PST SGM ON: CPT | Performed by: OPHTHALMOLOGY

## 2023-05-30 PROCEDURE — 99214 OFFICE O/P EST MOD 30 MIN: CPT | Performed by: OPHTHALMOLOGY

## 2023-05-30 RX ORDER — TIMOLOL MALEATE 5 MG/ML
1 SOLUTION/ DROPS OPHTHALMIC 2 TIMES DAILY
Qty: 10 ML | Refills: 3 | Status: SHIPPED | OUTPATIENT
Start: 2023-05-30 | End: 2023-10-18

## 2023-05-30 ASSESSMENT — CONF VISUAL FIELD
OD_NORMAL: 1
OS_INFERIOR_TEMPORAL_RESTRICTION: 0
OD_INFERIOR_NASAL_RESTRICTION: 0
METHOD: COUNTING FINGERS
OS_SUPERIOR_NASAL_RESTRICTION: 0
OD_INFERIOR_TEMPORAL_RESTRICTION: 0
OD_SUPERIOR_NASAL_RESTRICTION: 0
OS_NORMAL: 1
OD_SUPERIOR_TEMPORAL_RESTRICTION: 0
OS_INFERIOR_NASAL_RESTRICTION: 0
OS_SUPERIOR_TEMPORAL_RESTRICTION: 0

## 2023-05-30 ASSESSMENT — REFRACTION_WEARINGRX
OS_SPHERE: -2.00
OS_CYLINDER: SPHERE
OD_SPHERE: -2.00
OD_CYLINDER: SPHERE

## 2023-05-30 ASSESSMENT — TONOMETRY
OS_IOP_MMHG: 21
OD_IOP_MMHG: 19
IOP_METHOD: TONOPEN
OD_IOP_MMHG: 18
OS_IOP_MMHG: 18
IOP_METHOD: APPLANATION

## 2023-05-30 ASSESSMENT — VISUAL ACUITY
OD_CC+: -2
OD_CC: 20/25
CORRECTION_TYPE: GLASSES
METHOD: SNELLEN - LINEAR
OS_CC: 20/25
OS_CC+: -1

## 2023-05-30 ASSESSMENT — EXTERNAL EXAM - RIGHT EYE: OD_EXAM: NORMAL

## 2023-05-30 ASSESSMENT — EXTERNAL EXAM - LEFT EYE: OS_EXAM: NORMAL

## 2023-05-30 NOTE — PATIENT INSTRUCTIONS
Timolol (yellow top)- apply 1 drop twice a day in each eye     Latanoprost (green top)- apply once drop at bedtime to each eye

## 2023-05-30 NOTE — NURSING NOTE
Chief Complaints and History of Present Illnesses   Patient presents with     Glaucoma Suspect Follow Up     9 month follow up both eyes     Chief Complaint(s) and History of Present Illness(es)     Glaucoma Suspect Follow Up            Comments: 9 month follow up both eyes          Comments    Pt states vision in RE is still bothersome, but the same as last visit. No eye pain today, but pt notes irritation in RE yesterday.  No new flashes or floaters. Dryness in BE, relief with drops.    RADHA Patel May 30, 2023 1:16 PM

## 2023-05-30 NOTE — PROGRESS NOTES
Chief Complaint/Presenting Concern: glaucoma follow up     History of Present Illness:   Paul Roland is a 67 year old patient who presents for evaluation of glaucoma. Seen by Dr. Zhong (optometrist) and referred for glaucoma suspect.     Using Latanoprost, sometimes misses the drops. No change in vision since last visit.     Relevant Past Medical/Family/Social History:  PMH: arthritis  FH: non contributory     Relevant Review of Systems: none     Diagnosis: Primary open angle glaucoma each eye moderate stage each eye   Year diagnosis:2022  Previous glaucoma surgery/laser:  Maximum intraocular pressure 21/21  Currently Meds: none  Family history: negative  CCT: 566/559  Gonio: open to scleral spur 360 in both eyes  Refractive status: low myope  Trauma history: negative  Steroid exposure: negative steroid injections in metatarsal joint [id number] right foot  Vasospastic disease: Migrane/Raynaud phenomenon: negative  A past hemodynamic crisis or Low BP: negative  Meds AEs/intolerance: none  PMHx: Asthma and respiratory problems/Cardiac/Renal/Kidney stones/Sulfa Allergy: none  Anticoagulants: none    Today's testing:  - IOP: 18/18 mmHg  - Visual field 05/30/23  - Right eye - scattered defects in both hemifields, arcuate? inferior and superiorly, improved from previous VF   - Left eye - inferior nasal step with extension, superior arcuate, stable with slight improvement from previous   - OCT Optic Nerve RNFL Spectralis 05/30/23  - Right Eye: Superior and IT RNFL thinning, worse compared to baseline   - Left Eye: Superior and IT RNFL thinning, worse compared to baseline      Additional Ocular History:     2. Cataracts each eye   - not visually significant    3. RICKY  - AT QID PRN  - warm compresses    Plan/Recommendations:    Discussed findings with patient. Findings suggestive of glaucoma each eye, OCT RNFL worse compared to last visit. Aim for IOP in the mid teens each eye. Discussed adding drops versus SLT versus  CE/IOL/MIGS, patient prefers to add more drops.     Continue Latanoprost at bedtime each eye     Start Timolol 1 drop twice daily each eye     RTC in 6 weeks VA, IOP      Physician Attestation     Attending Physician Attestation:  Complete documentation of historical and exam elements from today's encounter can be found in the full encounter summary report (not reduplicated in this progress note). I personally obtained the chief complaint(s) and history of present illness. I confirmed and edited as necessary the review of systems, past medical/surgical history, family history, social history, and examination findings as documented by others; and I examined the patient myself. I personally reviewed the relevant tests, images, and reports as documented above. I personally reviewed the ophthalmic test(s) associated with this encounter. I formulated and edited as necessary the assessment and plan and discussed the findings and management plan with the patient and any family members present at the time of the visit.  Yadira Noriega M.D., Glaucoma, May 30, 2023

## 2023-06-21 DIAGNOSIS — H40.003 GLAUCOMA SUSPECT OF BOTH EYES: ICD-10-CM

## 2023-07-13 ENCOUNTER — OFFICE VISIT (OUTPATIENT)
Dept: OPHTHALMOLOGY | Facility: CLINIC | Age: 68
End: 2023-07-13
Attending: OPHTHALMOLOGY
Payer: COMMERCIAL

## 2023-07-13 DIAGNOSIS — H40.1132 PRIMARY OPEN ANGLE GLAUCOMA (POAG) OF BOTH EYES, MODERATE STAGE: Primary | ICD-10-CM

## 2023-07-13 PROCEDURE — G0463 HOSPITAL OUTPT CLINIC VISIT: HCPCS | Performed by: OPHTHALMOLOGY

## 2023-07-13 PROCEDURE — 99213 OFFICE O/P EST LOW 20 MIN: CPT | Performed by: OPHTHALMOLOGY

## 2023-07-13 ASSESSMENT — CONF VISUAL FIELD
OS_SUPERIOR_TEMPORAL_RESTRICTION: 0
OD_NORMAL: 1
OS_INFERIOR_NASAL_RESTRICTION: 0
OD_INFERIOR_TEMPORAL_RESTRICTION: 0
METHOD: COUNTING FINGERS
OD_INFERIOR_NASAL_RESTRICTION: 0
OS_INFERIOR_TEMPORAL_RESTRICTION: 0
OD_SUPERIOR_NASAL_RESTRICTION: 0
OD_SUPERIOR_TEMPORAL_RESTRICTION: 0
OS_NORMAL: 1
OS_SUPERIOR_NASAL_RESTRICTION: 0

## 2023-07-13 ASSESSMENT — VISUAL ACUITY
OS_CC: 20/25
METHOD: SNELLEN - LINEAR
OS_CC+: -1
OD_CC: 20/20
OD_CC+: -2

## 2023-07-13 ASSESSMENT — TONOMETRY
OD_IOP_MMHG: 13
OS_IOP_MMHG: 12
IOP_METHOD: TONOPEN
OS_IOP_MMHG: 10
IOP_METHOD: APPLANATION MB
OD_IOP_MMHG: 14

## 2023-07-13 ASSESSMENT — EXTERNAL EXAM - LEFT EYE: OS_EXAM: NORMAL

## 2023-07-13 ASSESSMENT — EXTERNAL EXAM - RIGHT EYE: OD_EXAM: NORMAL

## 2023-07-13 NOTE — PROGRESS NOTES
Chief Complaint/Presenting Concern: glaucoma follow up     History of Present Illness:   Paul Roland is a 67 year old patient who presents for evaluation of glaucoma. Seen by Dr. Zhong (optometrist) and referred for glaucoma suspect.     07/13/23: Doing well. No new concerns. Using latanoprost at bedtime OU, timolol BID OU. Patient states he is not having an issue with missed doses. Still not interested in SLT    Relevant Past Medical/Family/Social History:  PMH: arthritis  FH: non contributory     Relevant Review of Systems: none     Diagnosis: Primary open angle glaucoma each eye moderate stage each eye   Year diagnosis:2022  Previous glaucoma surgery/laser:  Maximum intraocular pressure 21/21  Currently Meds: none  Family history: negative  CCT: 566/559  Gonio: open to scleral spur 360 in both eyes  Refractive status: low myope  Trauma history: negative  Steroid exposure: negative steroid injections in metatarsal joint [id number] right foot  Vasospastic disease: Migrane/Raynaud phenomenon: negative  A past hemodynamic crisis or Low BP: negative  Meds AEs/intolerance: none  PMHx: Asthma and respiratory problems/Cardiac/Renal/Kidney stones/Sulfa Allergy: none  Anticoagulants: none    Previous Testing:  - Visual field 05/30/23  - Right eye - scattered defects in both hemifields, arcuate? inferior and superiorly, improved from previous VF   - Left eye - inferior nasal step with extension, superior arcuate, stable with slight improvement from previous   - OCT Optic Nerve RNFL Spectralis 05/30/23  - Right Eye: Superior and IT RNFL thinning, worse compared to baseline   - Left Eye: Superior and IT RNFL thinning, worse compared to baseline      Today's testing:  - IOP: 14/12 mmHg    Additional Ocular History:     2. Cataracts each eye   - not visually significant    3. RICKY  - AT QID PRN  - warm compresses    Plan/Recommendations:  Discussed findings with patient. Findings suggestive of glaucoma each eye, OCT RNFL  worse compared to last visit. Aim for IOP in the mid teens each eye. Discussed adding drops versus SLT versus CE/IOL/MIGS, patient preferred to add more drops.   Discussed importance of good drop compliance. Re-discussed option of SLT, patient prefers to stay on drops at this time  Good response with addition of timolol BID at last visit. IOP 14/12 today  Continue Latanoprost at bedtime each eye   Continue Timolol 1 drop twice daily each eye     RTC 5 months VA, IOP, VF, OCT RNFL    Dayan Diehl MD  Resident Physician, PGY-3  Department of Ophthalmology      Physician Attestation     Attending Physician Attestation:  Complete documentation of historical and exam elements from today's encounter can be found in the full encounter summary report (not reduplicated in this progress note). I personally obtained the chief complaint(s) and history of present illness. I confirmed and edited as necessary the review of systems, past medical/surgical history, family history, social history, and examination findings as documented by others; and I examined the patient myself. I personally reviewed the relevant tests, images, and reports as documented above. I formulated and edited as necessary the assessment and plan and discussed the findings and management plan with the patient and any family members present at the time of the visit.  Yadira Noriega M.D., Glaucoma, July 13, 2023

## 2023-07-13 NOTE — NURSING NOTE
Chief Complaints and History of Present Illnesses   Patient presents with     Follow Up       Continue Latanoprost at bedtime each eye     Start Timolol 1 drop twice daily each eye      Chief Complaint(s) and History of Present Illness(es)     Follow Up            Comments:   Continue Latanoprost at bedtime each eye     Start Timolol 1 drop twice daily each eye           Comments    Pt states no change in VA since last visit  States no flashes, floaters eye pain or redness  Using:  Latanoprost at bedtime each eye   Timolol 1 drop twice daily each eye     Jumana Ashley COT 2:47 PM July 13, 2023

## 2023-07-20 DIAGNOSIS — M20.21 ACQUIRED HALLUX RIGIDUS, RIGHT: ICD-10-CM

## 2023-07-24 NOTE — TELEPHONE ENCOUNTER
diclofenac (VOLTAREN) 75 MG EC tablet  Last Written Prescription Date:  5/4/23  Last Fill Quantity: 60,   # refills: 1  Last Office Visit : 5/12/23  Future Office visit: none    Routing refill request to provider for review/approval because:  NSAID Medications Failed 07/20/2023 01:32 AM   Protocol Details  Blood pressure under 140/90 in past 12 months    Normal ALT on file in past 12 months    Normal AST on file in past 12 months    Patient is age 6-64 years    Normal CBC on file in past 12 months    Normal serum creatinine on file in past 12 months

## 2023-07-25 RX ORDER — DICLOFENAC SODIUM 75 MG/1
TABLET, DELAYED RELEASE ORAL
Qty: 60 TABLET | Refills: 1 | Status: SHIPPED | OUTPATIENT
Start: 2023-07-25 | End: 2023-10-18

## 2023-09-19 RX ORDER — LATANOPROST 50 UG/ML
SOLUTION/ DROPS OPHTHALMIC
Qty: 7.5 ML | Refills: 4 | OUTPATIENT
Start: 2023-09-19

## 2023-10-18 ENCOUNTER — OFFICE VISIT (OUTPATIENT)
Dept: FAMILY MEDICINE | Facility: CLINIC | Age: 68
End: 2023-10-18
Payer: OTHER MISCELLANEOUS

## 2023-10-18 VITALS
RESPIRATION RATE: 17 BRPM | BODY MASS INDEX: 28.56 KG/M2 | WEIGHT: 204 LBS | HEIGHT: 71 IN | SYSTOLIC BLOOD PRESSURE: 146 MMHG | OXYGEN SATURATION: 99 % | DIASTOLIC BLOOD PRESSURE: 84 MMHG | HEART RATE: 55 BPM | TEMPERATURE: 97.3 F

## 2023-10-18 DIAGNOSIS — Z01.818 PREOP GENERAL PHYSICAL EXAM: Primary | ICD-10-CM

## 2023-10-18 DIAGNOSIS — M54.12 CERVICAL RADICULOPATHY: ICD-10-CM

## 2023-10-18 DIAGNOSIS — I10 ESSENTIAL HYPERTENSION: ICD-10-CM

## 2023-10-18 DIAGNOSIS — F17.200 CURRENT SMOKER ON SOME DAYS: ICD-10-CM

## 2023-10-18 LAB
ANION GAP SERPL CALCULATED.3IONS-SCNC: 11 MMOL/L (ref 7–15)
BUN SERPL-MCNC: 8.5 MG/DL (ref 8–23)
CALCIUM SERPL-MCNC: 9.6 MG/DL (ref 8.8–10.2)
CHLORIDE SERPL-SCNC: 100 MMOL/L (ref 98–107)
CREAT SERPL-MCNC: 1 MG/DL (ref 0.67–1.17)
DEPRECATED HCO3 PLAS-SCNC: 27 MMOL/L (ref 22–29)
EGFRCR SERPLBLD CKD-EPI 2021: 81 ML/MIN/1.73M2
ERYTHROCYTE [DISTWIDTH] IN BLOOD BY AUTOMATED COUNT: 13.1 % (ref 10–15)
GLUCOSE SERPL-MCNC: 101 MG/DL (ref 70–99)
HCT VFR BLD AUTO: 45.6 % (ref 40–53)
HGB BLD-MCNC: 15.1 G/DL (ref 13.3–17.7)
MCH RBC QN AUTO: 30.3 PG (ref 26.5–33)
MCHC RBC AUTO-ENTMCNC: 33.1 G/DL (ref 31.5–36.5)
MCV RBC AUTO: 91 FL (ref 78–100)
PLATELET # BLD AUTO: 183 10E3/UL (ref 150–450)
POTASSIUM SERPL-SCNC: 4.4 MMOL/L (ref 3.4–5.3)
RBC # BLD AUTO: 4.99 10E6/UL (ref 4.4–5.9)
SODIUM SERPL-SCNC: 138 MMOL/L (ref 135–145)
WBC # BLD AUTO: 3.9 10E3/UL (ref 4–11)

## 2023-10-18 PROCEDURE — 36415 COLL VENOUS BLD VENIPUNCTURE: CPT | Performed by: FAMILY MEDICINE

## 2023-10-18 PROCEDURE — 85027 COMPLETE CBC AUTOMATED: CPT | Performed by: FAMILY MEDICINE

## 2023-10-18 PROCEDURE — 99214 OFFICE O/P EST MOD 30 MIN: CPT | Performed by: FAMILY MEDICINE

## 2023-10-18 PROCEDURE — 80048 BASIC METABOLIC PNL TOTAL CA: CPT | Performed by: FAMILY MEDICINE

## 2023-10-18 RX ORDER — RESPIRATORY SYNCYTIAL VIRUS VACCINE 120MCG/0.5
0.5 KIT INTRAMUSCULAR ONCE
Qty: 1 EACH | Refills: 0 | Status: CANCELLED | OUTPATIENT
Start: 2023-10-18 | End: 2023-10-18

## 2023-10-18 RX ORDER — LATANOPROST 50 UG/ML
1 SOLUTION/ DROPS OPHTHALMIC AT BEDTIME
COMMUNITY
Start: 2023-10-18

## 2023-10-18 ASSESSMENT — ENCOUNTER SYMPTOMS
WEAKNESS: 0
MYALGIAS: 0
FEVER: 0
DYSURIA: 0
DIZZINESS: 0
HEMATOCHEZIA: 0
PALPITATIONS: 0
SORE THROAT: 0
CHILLS: 0
HEADACHES: 0
COUGH: 0
EYE PAIN: 0
HEMATURIA: 0
NAUSEA: 0
NERVOUS/ANXIOUS: 0
HEARTBURN: 0
DIARRHEA: 0
PARESTHESIAS: 0
ABDOMINAL PAIN: 0
JOINT SWELLING: 0
ARTHRALGIAS: 0
FREQUENCY: 0
CONSTIPATION: 0
SHORTNESS OF BREATH: 0

## 2023-10-18 ASSESSMENT — ACTIVITIES OF DAILY LIVING (ADL): CURRENT_FUNCTION: NO ASSISTANCE NEEDED

## 2023-10-18 ASSESSMENT — PAIN SCALES - GENERAL: PAINLEVEL: NO PAIN (0)

## 2023-10-18 NOTE — PROGRESS NOTES
[address]  [address]  [place] [address]  Phone: [phone]  Fax: [phone]  Primary Provider: Davion Sanon  Pre-op Performing Provider: DAVION SANON      PREOPERATIVE EVALUATION:  Today's date: 10/18/2023    Paul is a 68 year old male who presents for a preoperative evaluation.      10/18/2023     3:50 PM   Additional Questions   Roomed by Destini FINLEY     Surgical Information:  Surgery/Procedure: Cervical four to cervical five, cervical five to cervical six then cervical six to cervical seven anterior cervical discectomy and fusion with allograft   Surgery Location: [facility]  Surgeon: Salma Greenberg  Surgery Date: 10/27/2023  Time of Surgery: 07:00AM  Where patient plans to recover: At home with family  Fax number for surgical facility: ?    Assessment & Plan     The proposed surgical procedure is considered INTERMEDIATE risk.    Preop general physical exam     - CBC with platelets; Future  - Basic metabolic panel  (Ca, Cl, CO2, Creat, Gluc, K, Na, BUN); Future  - CBC with platelets  - Basic metabolic panel  (Ca, Cl, CO2, Creat, Gluc, K, Na, BUN)    Cervical radiculopathy  I do not have any records available but I suspect it is cervical radiculopathy for what he is going for cervical fusion.    Current smoker on some days  Strongly recommended him to quit smoking completely.  Discussed quitting smoking even short period of the time can help surgical and anesthesia outcome.    Essential hypertension  No previous diagnosis but blood pressure is significantly elevated today.  He has had on and off elevated blood pressure readings in the past and I suspect essential hypertension is the primary culprit.  We discussed silent nature of hypertension and its complications.  His blood pressure improved upon recheck.  We discussed quitting smoking completely, better pain control, weight loss, regular exercise, meditation can  help but after surgery he should follow-up with me and most likely would benefit from starting antihypertensive medication.  He understood.  BP stable enough to go for surgery.       - No identified additional risk factors other than previously addressed    Antiplatelet or Anticoagulation Medication Instructions:  Stop nsaids 5 days before surgery.     Additional Medication Instructions:  Patient is on no additional chronic medications    RECOMMENDATION:  APPROVAL GIVEN to proceed with proposed procedure, without further diagnostic evaluation.            Subjective     HPI related to upcoming procedure: chronic back pain.   Waiting for neck surgery for a while.   No chest pain or short of breath. No pressure or pain with activities.   Occasionally tingling in fingers from neck.  Diclofenac once per day daily. When pain is bad - needs 2. Usually one is enough.   No other medications. No supplement or vitamins.   Quitting smoking - 1-2 cigs per day.  No drug use except for marijuana smoking.   No family history of HTN.         10/18/2023     3:39 PM   Preop Questions   1. Have you ever had a heart attack or stroke? No   2. Have you ever had surgery on your heart or blood vessels, such as a stent placement, a coronary artery bypass, or surgery on an artery in your head, neck, heart, or legs? No   3. Do you have chest pain with activity? No   4. Do you have a history of  heart failure? No   5. Do you currently have a cold, bronchitis or symptoms of other infection? No   6. Do you have a cough, shortness of breath, or wheezing? No   7. Do you or anyone in your family have previous history of blood clots? No   8. Do you or does anyone in your family have a serious bleeding problem such as prolonged bleeding following surgeries or cuts? No   9. Have you ever had problems with anemia or been told to take iron pills? No   10. Have you had any abnormal blood loss such as black, tarry or bloody stools? No   11. Have you ever  had a blood transfusion? No   12. Are you willing to have a blood transfusion if it is medically needed before, during, or after your surgery? Yes   13. Have you or any of your relatives ever had problems with anesthesia? No   14. Do you have sleep apnea, excessive snoring or daytime drowsiness? No   15. Do you have any artifical heart valves or other implanted medical devices like a pacemaker, defibrillator, or continuous glucose monitor? No   16. Do you have artificial joints? No   17. Are you allergic to latex? No       Health Care Directive:  Patient does not have a Health Care Directive or Living Will: Discussed advance care planning with patient; information given to patient to review.    Preoperative Review of :   reviewed - no record of controlled substances prescribed.          Review of Systems   Constitutional:  Negative for chills and fever.   HENT:  Negative for congestion, ear pain, hearing loss and sore throat.    Eyes:  Negative for pain and visual disturbance.   Respiratory:  Negative for cough and shortness of breath.    Cardiovascular:  Negative for chest pain, palpitations and peripheral edema.   Gastrointestinal:  Negative for abdominal pain, constipation, diarrhea, heartburn, hematochezia and nausea.   Genitourinary:  Positive for impotence. Negative for dysuria, frequency, genital sores, hematuria, penile discharge and urgency.   Musculoskeletal:  Negative for arthralgias, joint swelling and myalgias.   Skin:  Negative for rash.   Neurological:  Negative for dizziness, weakness, headaches and paresthesias.   Psychiatric/Behavioral:  Negative for mood changes. The patient is not nervous/anxious.      Patient Active Problem List    Diagnosis Date Noted    Cigarette nicotine dependence without complication 07/20/2021     Priority: Medium    Hyperlipidemia      Priority: Medium    ED (erectile dysfunction)      Priority: Medium     injection therapy        Past Medical History:   Diagnosis  "Date    BPH (benign prostatic hyperplasia)     ED (erectile dysfunction)     injection therapy    Hyperlipidemia     Rectal cancer (H) 08/10/2017    Clinical: Stage I (T2, N0, M0)      Past Surgical History:   Procedure Laterality Date    RECTAL SURGERY  2017    with diverting loop ileostomy     TURP       Current Outpatient Medications   Medication Sig Dispense Refill    diclofenac (VOLTAREN) 75 MG EC tablet Take 1 tablet (75 mg) by mouth 2 times daily as needed for moderate pain 20 tablet 0    latanoprost (XALATAN) 0.005 % ophthalmic solution Place 1 drop into both eyes at bedtime      polyethylene glycol (GOLYTELY) 236 g suspension Take as directed. One day before exam fill the jug with water. Cover and shake until well mixed. At 6 PM start drinking an 8oz glass of mixture every 15 minutes until jug is 1/2 empty. Store remainder in the refrigerator.  At 11 PM Start drinking the other half of the Golytely jug. Drink one 8-ounce glass every 15 minutes until the jug is empty. (Patient not taking: Reported on 10/18/2023) 4000 mL 0       No Known Allergies     Social History     Tobacco Use    Smoking status: Former     Packs/day: .5     Types: Cigarettes     Quit date: 10/10/2023     Years since quittin.0    Smokeless tobacco: Never   Substance Use Topics    Alcohol use: Yes     Comment: few drinks per day     Family History   Problem Relation Age of Onset    Liver Cancer Mother     Glaucoma No family hx of     Macular Degeneration No family hx of      History   Drug Use Unknown         Objective     BP (!) 146/84 (BP Location: Right arm, Patient Position: Sitting, Cuff Size: Adult Regular)   Pulse 55   Temp 97.3  F (36.3  C) (Temporal)   Resp 17   Ht 1.791 m (5' 10.5\")   Wt 92.5 kg (204 lb)   SpO2 99%   BMI 28.86 kg/m      Physical Exam  GENERAL APPEARANCE: healthy, alert and no distress  HENT: ear canals and TM's normal and nose and mouth without ulcers or lesions  RESP: lungs clear to auscultation " "- no rales, rhonchi or wheezes  CV: regular rate and rhythm, normal S1 S2, no S3 or S4 and no murmur, click or rub   ABDOMEN: soft, nontender, no HSM or masses and bowel sounds normal  NEURO: Normal strength and tone, sensory exam grossly normal, mentation intact and speech normal    No results for input(s): \"HGB\", \"PLT\", \"INR\", \"NA\", \"POTASSIUM\", \"CR\", \"A1C\" in the last 87985 hours.     Diagnostics:  Recent Results (from the past 48 hour(s))   CBC with platelets    Collection Time: 10/18/23  4:36 PM   Result Value Ref Range    WBC Count 3.9 (L) 4.0 - 11.0 10e3/uL    RBC Count 4.99 4.40 - 5.90 10e6/uL    Hemoglobin 15.1 13.3 - 17.7 g/dL    Hematocrit 45.6 40.0 - 53.0 %    MCV 91 78 - 100 fL    MCH 30.3 26.5 - 33.0 pg    MCHC 33.1 31.5 - 36.5 g/dL    RDW 13.1 10.0 - 15.0 %    Platelet Count 183 150 - 450 10e3/uL   Basic metabolic panel  (Ca, Cl, CO2, Creat, Gluc, K, Na, BUN)    Collection Time: 10/18/23  4:36 PM   Result Value Ref Range    Sodium 138 135 - 145 mmol/L    Potassium 4.4 3.4 - 5.3 mmol/L    Chloride 100 98 - 107 mmol/L    Carbon Dioxide (CO2) 27 22 - 29 mmol/L    Anion Gap 11 7 - 15 mmol/L    Urea Nitrogen 8.5 8.0 - 23.0 mg/dL    Creatinine 1.00 0.67 - 1.17 mg/dL    GFR Estimate 81 >60 mL/min/1.73m2    Calcium 9.6 8.8 - 10.2 mg/dL    Glucose 101 (H) 70 - 99 mg/dL      No EKG required, no history of coronary heart disease, significant arrhythmia, peripheral arterial disease or other structural heart disease.    Revised Cardiac Risk Index (RCRI):  The patient has the following serious cardiovascular risks for perioperative complications:   - No serious cardiac risks = 0 points     RCRI Interpretation: 0 points: Class I (very low risk - 0.4% complication rate)         Signed Electronically by: Davion Sanon MD  Copy of this evaluation report is provided to requesting physician.      "

## 2023-10-18 NOTE — PATIENT INSTRUCTIONS
Preparing for Your Surgery  Getting started  A nurse will call you to review your health history and instructions. They will give you an arrival time based on your scheduled surgery time. Please be ready to share:  Your doctor's clinic name and phone number  Your medical, surgical, and anesthesia history  A list of allergies and sensitivities  A list of medicines, including herbal treatments and over-the-counter drugs  Whether the patient has a legal guardian (ask how to send us the papers in advance)  Please tell us if you're pregnant--or if there's any chance you might be pregnant. Some surgeries may injure a fetus (unborn baby), so they require a pregnancy test. Surgeries that are safe for a fetus don't always need a test, and you can choose whether to have one.   If you have a child who's having surgery, please ask for a copy of Preparing for Your Child's Surgery.    Preparing for surgery  Within 10 to 30 days of surgery: Have a pre-op exam (sometimes called an H&P, or History and Physical). This can be done at a clinic or pre-operative center.  If you're having a , you may not need this exam. Talk to your care team.  At your pre-op exam, talk to your care team about all medicines you take. If you need to stop any medicines before surgery, ask when to start taking them again.  We do this for your safety. Many medicines can make you bleed too much during surgery. Some change how well surgery (anesthesia) drugs work.  Call your insurance company to let them know you're having surgery. (If you don't have insurance, call [phone].)  Call your clinic if there's any change in your health. This includes signs of a cold or flu (sore throat, runny nose, cough, rash, fever). It also includes a scrape or scratch near the surgery site.  If you have questions on the day of surgery, call your hospital or surgery center.  Eating and drinking guidelines  For your safety: Unless your surgeon tells you otherwise,  follow the guidelines below.  Eat and drink as usual until 8 hours before you arrive for surgery. After that, no food or milk.  Drink clear liquids until 2 hours before you arrive. These are liquids you can see through, like water, Gatorade, and Propel Water. They also include plain black coffee and tea (no cream or milk), candy, and breath mints. You can spit out gum when you arrive.  If you drink alcohol: Stop drinking it the night before surgery.  If your care team tells you to take medicine on the morning of surgery, it's okay to take it with a sip of water.  Preventing infection  Shower or bathe the night before and morning of your surgery. Follow the instructions your clinic gave you. (If no instructions, use regular soap.)  Don't shave or clip hair near your surgery site. We'll remove the hair if needed.  Don't smoke or vape the morning of surgery. You may chew nicotine gum up to 2 hours before surgery. A nicotine patch is okay.  Note: Some surgeries require you to completely quit smoking and nicotine. Check with your surgeon.  Your care team will make every effort to keep you safe from infection. We will:  Clean our hands often with soap and water (or an alcohol-based hand rub).  Clean the skin at your surgery site with a special soap that kills germs.  Give you a special gown to keep you warm. (Cold raises the risk of infection.)  Wear special hair covers, masks, gowns and gloves during surgery.  Give antibiotic medicine, if prescribed. Not all surgeries need antibiotics.  What to bring on the day of surgery  Photo ID and insurance card  Copy of your health care directive, if you have one  Glasses and hearing aids (bring cases)  You can't wear contacts during surgery  Inhaler and eye drops, if you use them (tell us about these when you arrive)  CPAP machine or breathing device, if you use them  A few personal items, if spending the night  If you have . . .  A pacemaker, ICD (cardiac defibrillator) or other  implant: Bring the ID card.  An implanted stimulator: Bring the remote control.  A legal guardian: Bring a copy of the certified (court-stamped) guardianship papers.  Please remove any jewelry, including body piercings. Leave jewelry and other valuables at home.  If you're going home the day of surgery  You must have a responsible adult drive you home. They should stay with you overnight as well.  If you don't have someone to stay with you, and you aren't safe to go home alone, we may keep you overnight. Insurance often won't pay for this.  After surgery  If it's hard to control your pain or you need more pain medicine, please call your surgeon's office.  Questions?   If you have any questions for your care team, list them here: _________________________________________________________________________________________________________________________________________________________________________ ____________________________________ ____________________________________ ____________________________________  For informational purposes only. Not to replace the advice of your health care provider. Copyright   2003, 2019 [facility]. All rights reserved. Clinically reviewed by Obdulia Aldrich MD. [employer] [id number] - REV 12/22.

## 2023-10-18 NOTE — LETTER
10/18/2023        RE: Paul Roland  [address]  [address]  [place] [address]        [facility]  [address]  [address]  [place] [address]  Phone: [phone]  Fax: [phone]  Primary Provider: Davion Sagastume  Pre-op Performing Provider: DAVION SAGASTUME      PREOPERATIVE EVALUATION:  Today's date: 10/18/2023    Paul is a 68 year old male who presents for a preoperative evaluation.      10/18/2023     3:50 PM   Additional Questions   Roomed by Destini FINLEY     Surgical Information:  Surgery/Procedure: Cervical four to cervical five, cervical five to cervical six then cervical six to cervical seven anterior cervical discectomy and fusion with allograft   Surgery Location: [facility]  Surgeon: Salma Greenberg  Surgery Date: 10/27/2023  Time of Surgery: 07:00AM  Where patient plans to recover: At home with family  Fax number for surgical facility: ?    Assessment & Plan    The proposed surgical procedure is considered INTERMEDIATE risk.    Preop general physical exam     - CBC with platelets; Future  - Basic metabolic panel  (Ca, Cl, CO2, Creat, Gluc, K, Na, BUN); Future  - CBC with platelets  - Basic metabolic panel  (Ca, Cl, CO2, Creat, Gluc, K, Na, BUN)    Cervical radiculopathy  I do not have any records available but I suspect it is cervical radiculopathy for what he is going for cervical fusion.    Current smoker on some days  Strongly recommended him to quit smoking completely.  Discussed quitting smoking even short period of the time can help surgical and anesthesia outcome.    Essential hypertension  No previous diagnosis but blood pressure is significantly elevated today.  He has had on and off elevated blood pressure readings in the past and I suspect essential hypertension is the primary culprit.  We discussed silent nature of hypertension and its complications.  His blood pressure improved upon recheck.  We  discussed quitting smoking completely, better pain control, weight loss, regular exercise, meditation can help but after surgery he should follow-up with me and most likely would benefit from starting antihypertensive medication.  He understood.  BP stable enough to go for surgery.       - No identified additional risk factors other than previously addressed    Antiplatelet or Anticoagulation Medication Instructions:  Stop nsaids 5 days before surgery.     Additional Medication Instructions:  Patient is on no additional chronic medications    RECOMMENDATION:  APPROVAL GIVEN to proceed with proposed procedure, without further diagnostic evaluation.            Subjective     HPI related to upcoming procedure: chronic back pain.   Waiting for neck surgery for a while.   No chest pain or short of breath. No pressure or pain with activities.   Occasionally tingling in fingers from neck.  Diclofenac once per day daily. When pain is bad - needs 2. Usually one is enough.   No other medications. No supplement or vitamins.   Quitting smoking - 1-2 cigs per day.  No drug use except for marijuana smoking.   No family history of HTN.         10/18/2023     3:39 PM   Preop Questions   1. Have you ever had a heart attack or stroke? No   2. Have you ever had surgery on your heart or blood vessels, such as a stent placement, a coronary artery bypass, or surgery on an artery in your head, neck, heart, or legs? No   3. Do you have chest pain with activity? No   4. Do you have a history of  heart failure? No   5. Do you currently have a cold, bronchitis or symptoms of other infection? No   6. Do you have a cough, shortness of breath, or wheezing? No   7. Do you or anyone in your family have previous history of blood clots? No   8. Do you or does anyone in your family have a serious bleeding problem such as prolonged bleeding following surgeries or cuts? No   9. Have you ever had problems with anemia or been told to take iron pills? No    10. Have you had any abnormal blood loss such as black, tarry or bloody stools? No   11. Have you ever had a blood transfusion? No   12. Are you willing to have a blood transfusion if it is medically needed before, during, or after your surgery? Yes   13. Have you or any of your relatives ever had problems with anesthesia? No   14. Do you have sleep apnea, excessive snoring or daytime drowsiness? No   15. Do you have any artifical heart valves or other implanted medical devices like a pacemaker, defibrillator, or continuous glucose monitor? No   16. Do you have artificial joints? No   17. Are you allergic to latex? No       Health Care Directive:  Patient does not have a Health Care Directive or Living Will: Discussed advance care planning with patient; information given to patient to review.    Preoperative Review of :   reviewed - no record of controlled substances prescribed.          Review of Systems   Constitutional:  Negative for chills and fever.   HENT:  Negative for congestion, ear pain, hearing loss and sore throat.    Eyes:  Negative for pain and visual disturbance.   Respiratory:  Negative for cough and shortness of breath.    Cardiovascular:  Negative for chest pain, palpitations and peripheral edema.   Gastrointestinal:  Negative for abdominal pain, constipation, diarrhea, heartburn, hematochezia and nausea.   Genitourinary:  Positive for impotence. Negative for dysuria, frequency, genital sores, hematuria, penile discharge and urgency.   Musculoskeletal:  Negative for arthralgias, joint swelling and myalgias.   Skin:  Negative for rash.   Neurological:  Negative for dizziness, weakness, headaches and paresthesias.   Psychiatric/Behavioral:  Negative for mood changes. The patient is not nervous/anxious.      Patient Active Problem List    Diagnosis Date Noted     Cigarette nicotine dependence without complication 07/20/2021     Priority: Medium     Hyperlipidemia      Priority: Medium     ED  "(erectile dysfunction)      Priority: Medium     injection therapy        Past Medical History:   Diagnosis Date     BPH (benign prostatic hyperplasia)      ED (erectile dysfunction)     injection therapy     Hyperlipidemia      Rectal cancer (H) 08/10/2017    Clinical: Stage I (T2, N0, M0)      Past Surgical History:   Procedure Laterality Date     RECTAL SURGERY  2017    with diverting loop ileostomy      TURP       Current Outpatient Medications   Medication Sig Dispense Refill     diclofenac (VOLTAREN) 75 MG EC tablet Take 1 tablet (75 mg) by mouth 2 times daily as needed for moderate pain 20 tablet 0     latanoprost (XALATAN) 0.005 % ophthalmic solution Place 1 drop into both eyes at bedtime       polyethylene glycol (GOLYTELY) 236 g suspension Take as directed. One day before exam fill the jug with water. Cover and shake until well mixed. At 6 PM start drinking an 8oz glass of mixture every 15 minutes until jug is 1/2 empty. Store remainder in the refrigerator.  At 11 PM Start drinking the other half of the Golytely jug. Drink one 8-ounce glass every 15 minutes until the jug is empty. (Patient not taking: Reported on 10/18/2023) 4000 mL 0       No Known Allergies     Social History     Tobacco Use     Smoking status: Former     Packs/day: .5     Types: Cigarettes     Quit date: 10/10/2023     Years since quittin.0     Smokeless tobacco: Never   Substance Use Topics     Alcohol use: Yes     Comment: few drinks per day     Family History   Problem Relation Age of Onset     Liver Cancer Mother      Glaucoma No family hx of      Macular Degeneration No family hx of      History   Drug Use Unknown         Objective     BP (!) 146/84 (BP Location: Right arm, Patient Position: Sitting, Cuff Size: Adult Regular)   Pulse 55   Temp 97.3  F (36.3  C) (Temporal)   Resp 17   Ht 1.791 m (5' 10.5\")   Wt 92.5 kg (204 lb)   SpO2 99%   BMI 28.86 kg/m      Physical Exam  GENERAL APPEARANCE: healthy, alert and no " "distress  HENT: ear canals and TM's normal and nose and mouth without ulcers or lesions  RESP: lungs clear to auscultation - no rales, rhonchi or wheezes  CV: regular rate and rhythm, normal S1 S2, no S3 or S4 and no murmur, click or rub   ABDOMEN: soft, nontender, no HSM or masses and bowel sounds normal  NEURO: Normal strength and tone, sensory exam grossly normal, mentation intact and speech normal    No results for input(s): \"HGB\", \"PLT\", \"INR\", \"NA\", \"POTASSIUM\", \"CR\", \"A1C\" in the last 73733 hours.     Diagnostics:  Recent Results (from the past 48 hour(s))   CBC with platelets    Collection Time: 10/18/23  4:36 PM   Result Value Ref Range    WBC Count 3.9 (L) 4.0 - 11.0 10e3/uL    RBC Count 4.99 4.40 - 5.90 10e6/uL    Hemoglobin 15.1 13.3 - 17.7 g/dL    Hematocrit 45.6 40.0 - 53.0 %    MCV 91 78 - 100 fL    MCH 30.3 26.5 - 33.0 pg    MCHC 33.1 31.5 - 36.5 g/dL    RDW 13.1 10.0 - 15.0 %    Platelet Count 183 150 - 450 10e3/uL   Basic metabolic panel  (Ca, Cl, CO2, Creat, Gluc, K, Na, BUN)    Collection Time: 10/18/23  4:36 PM   Result Value Ref Range    Sodium 138 135 - 145 mmol/L    Potassium 4.4 3.4 - 5.3 mmol/L    Chloride 100 98 - 107 mmol/L    Carbon Dioxide (CO2) 27 22 - 29 mmol/L    Anion Gap 11 7 - 15 mmol/L    Urea Nitrogen 8.5 8.0 - 23.0 mg/dL    Creatinine 1.00 0.67 - 1.17 mg/dL    GFR Estimate 81 >60 mL/min/1.73m2    Calcium 9.6 8.8 - 10.2 mg/dL    Glucose 101 (H) 70 - 99 mg/dL      No EKG required, no history of coronary heart disease, significant arrhythmia, peripheral arterial disease or other structural heart disease.    Revised Cardiac Risk Index (RCRI):  The patient has the following serious cardiovascular risks for perioperative complications:   - No serious cardiac risks = 0 points     RCRI Interpretation: 0 points: Class I (very low risk - 0.4% complication rate)         Signed Electronically by: Davion Sanon MD  Copy of this evaluation report is provided to requesting " physician.          Sincerely,        Davion Sanon MD, MD

## 2023-10-19 PROBLEM — I10 ESSENTIAL HYPERTENSION: Status: ACTIVE | Noted: 2023-10-19

## 2023-10-19 ASSESSMENT — ENCOUNTER SYMPTOMS
COUGH: 0
NAUSEA: 0
DIZZINESS: 0
MYALGIAS: 0
SHORTNESS OF BREATH: 0
DIARRHEA: 0
PALPITATIONS: 0
ABDOMINAL PAIN: 0
NERVOUS/ANXIOUS: 0
HEMATOCHEZIA: 0
HEADACHES: 0
FEVER: 0
PARESTHESIAS: 0
CHILLS: 0
JOINT SWELLING: 0
HEMATURIA: 0
CONSTIPATION: 0
EYE PAIN: 0
SORE THROAT: 0
WEAKNESS: 0
DYSURIA: 0
FREQUENCY: 0
ARTHRALGIAS: 0
HEARTBURN: 0

## 2023-11-04 ENCOUNTER — HEALTH MAINTENANCE LETTER (OUTPATIENT)
Age: 68
End: 2023-11-04

## 2023-12-05 ENCOUNTER — OFFICE VISIT (OUTPATIENT)
Dept: ORTHOPEDICS | Facility: CLINIC | Age: 68
End: 2023-12-05
Payer: COMMERCIAL

## 2023-12-05 DIAGNOSIS — M20.21 ACQUIRED HALLUX RIGIDUS, RIGHT: Primary | ICD-10-CM

## 2023-12-05 PROCEDURE — 20604 DRAIN/INJ JOINT/BURSA W/US: CPT | Mod: RT | Performed by: FAMILY MEDICINE

## 2023-12-05 PROCEDURE — 99207 PR DROP WITH A PROCEDURE: CPT | Performed by: FAMILY MEDICINE

## 2023-12-05 RX ORDER — TRIAMCINOLONE ACETONIDE 40 MG/ML
20 INJECTION, SUSPENSION INTRA-ARTICULAR; INTRAMUSCULAR
Status: SHIPPED | OUTPATIENT
Start: 2023-12-05

## 2023-12-05 RX ORDER — LIDOCAINE HYDROCHLORIDE 10 MG/ML
0.5 INJECTION, SOLUTION EPIDURAL; INFILTRATION; INTRACAUDAL; PERINEURAL
Status: SHIPPED | OUTPATIENT
Start: 2023-12-05

## 2023-12-05 RX ORDER — DICLOFENAC SODIUM 75 MG/1
75 TABLET, DELAYED RELEASE ORAL 2 TIMES DAILY PRN
Qty: 20 TABLET | Refills: 0 | Status: SHIPPED | OUTPATIENT
Start: 2023-12-05 | End: 2023-12-06

## 2023-12-05 RX ADMIN — LIDOCAINE HYDROCHLORIDE 0.5 ML: 10 INJECTION, SOLUTION EPIDURAL; INFILTRATION; INTRACAUDAL; PERINEURAL at 07:12

## 2023-12-05 RX ADMIN — TRIAMCINOLONE ACETONIDE 20 MG: 40 INJECTION, SUSPENSION INTRA-ARTICULAR; INTRAMUSCULAR at 07:12

## 2023-12-05 NOTE — NURSING NOTE
[facility]   ORTHOPEDICS & SPORTS MEDICINE  [address] [place] [address]  Dept: [phone]  ______________________________________________________________________________    Patient: Paul Roland   : 1955   MRN: [id number]   2023    INVASIVE PROCEDURE SAFETY CHECKLIST    Date: 2023    Procedure:right great toe cortisone injection   Patient Name: Paul Roland  MRN: [id number]  YOB: 1955    Action: Complete sections as appropriate. Any discrepancy results in a HARD COPY until resolved.     PRE PROCEDURE:  Patient ID verified with 2 identifiers (name and  or MRN): Yes  Procedure and site verified with patient/designee (when able): Yes  Accurate consent documentation in medical record: Yes  H&P (or appropriate assessment) documented in medical record: Yes  H&P must be up to 20 days prior to procedure and updates within 24 hours of procedure as applicable: Yes  Relevant diagnostic and radiology test results appropriately labeled and displayed as applicable: Yes  Procedure site(s) marked with provider initials: Yes    TIMEOUT:  Time-Out performed immediately prior to starting procedure, including verbal and active participation of all team members addressing the following:Yes  * Correct patient identify  * Confirmed that the correct side and site are marked  * An accurate procedure consent form  * Agreement on the procedure to be done  * Correct patient position  * Relevant images and results are properly labeled and appropriately displayed  * The need to administer antibiotics or fluids for irrigation purposes during the procedure as applicable   * Safety precautions based on patient history or medication use    DURING PROCEDURE: Verification of correct person, site, and procedures any time the responsibility for care of the patient is transferred to another member of the care team.       Prior to injection, verified patient identity using  patient's name and date of birth.  Due to injection administration, patient instructed to remain in clinic for 15 minutes  afterwards, and to report any adverse reaction to me immediately.    Joint injection was performed.      Drug Amount Wasted:  Yes: 4.5 mg/ml   Vial/Syringe: Multi dose vial  Expiration Date:  05/27      Michela Otoole ATC  December 5, 2023

## 2023-12-05 NOTE — PROGRESS NOTES
PROCEDURE ENCOUNTER    [facility]  Orthopedics  Sterling Dia DO  2023     Name: Paul Roland  MRN: [id number]  Age: 68 year old  : 1955    Referring provider: HEMANT  Diagnosis: Hallux Rigidus of Right Great Toe    First Metatarsophalangeal joint Injection- Ultrasound Guided    The patient was informed of the risks and the benefits of the procedure and a written consent was signed.    The patient s Right great toe was prepped with chlorhexidine in sterile fashion.     20 mg of kenalog suspension was drawn up into a 3 mL syringe with 0.5 mL of 1% lidocaine.    Injection was performed using sterile technique.  Under ultrasound guidance a 1.5-inch 25-gauge needle was used to enter the 1st MTP joint of the right great toe.  Needle placement was visualized and documented with ultrasound.  Needle placed in short axis to the probe.  Ultrasound visualization was necessary due to decreased joint space in the setting of osteoarthritis.  Images were permanently stored for the patient's record. There were no complications. The patient tolerated the procedure well. There was negligible bleeding.    The patient was instructed to ice the toe upon leaving clinic and refrain from overuse over the next 3 days.     The patient was instructed to call or go to the emergency room with any unusual pain, swelling, redness, or if otherwise concerned.    Sterling Dia DO CAM  Primary Care Sports Medicine  [facility]    Small Joint Injection/Arthrocentesis: R great MTP    Date/Time: 2023 7:12 AM    Performed by: Sterling Dia DO  Authorized by: Sterling Dia DO  Indications:  Pain  Needle Size:  25 G  Guidance: ultrasound       Location:  Great toe    Site:  R great MTP                    Medications:  20 mg triamcinolone 40 MG/ML; 0.5 mL lidocaine (PF) 1 %          Procedure discussed: discussed risks, benefits, and alternatives    Consent Given by:  Patient  Timeout: timeout called  immediately prior to procedure    Prep: patient was prepped and draped in usual sterile fashion

## 2023-12-05 NOTE — LETTER
2023      RE: Paul Roland  [address]  [address]  [place] [address]     Dear Colleague,    Thank you for referring your patient, Paul Roland, to the [facility] SPORTS MEDICINE CLINIC [place]. Please see a copy of my visit note below.    PROCEDURE ENCOUNTER    [facility]  Orthopedics  Sterling Dia DO  2023     Name: Paul Roland  MRN: [id number]  Age: 68 year old  : 1955    Referring provider: HEMANT  Diagnosis: Hallux Rigidus of Right Great Toe    First Metatarsophalangeal joint Injection- Ultrasound Guided    The patient was informed of the risks and the benefits of the procedure and a written consent was signed.    The patient s Right great toe was prepped with chlorhexidine in sterile fashion.     20 mg of kenalog suspension was drawn up into a 3 mL syringe with 0.5 mL of 1% lidocaine.    Injection was performed using sterile technique.  Under ultrasound guidance a 1.5-inch 25-gauge needle was used to enter the 1st MTP joint of the right great toe.  Needle placement was visualized and documented with ultrasound.  Needle placed in short axis to the probe.  Ultrasound visualization was necessary due to decreased joint space in the setting of osteoarthritis.  Images were permanently stored for the patient's record. There were no complications. The patient tolerated the procedure well. There was negligible bleeding.    The patient was instructed to ice the toe upon leaving clinic and refrain from overuse over the next 3 days.     The patient was instructed to call or go to the emergency room with any unusual pain, swelling, redness, or if otherwise concerned.    Sterling Dia DO [facility]  Primary Care Sports Medicine  [facility]    Small Joint Injection/Arthrocentesis: R great MTP    Date/Time: 2023 7:12 AM    Performed by: Sterling Dia DO  Authorized by: Sterling Dia DO  Indications:  Pain  Needle Size:  25 G  Guidance:  ultrasound       Location:  Great toe    Site:  R great MTP                    Medications:  20 mg triamcinolone 40 MG/ML; 0.5 mL lidocaine (PF) 1 %          Procedure discussed: discussed risks, benefits, and alternatives    Consent Given by:  Patient  Timeout: timeout called immediately prior to procedure    Prep: patient was prepped and draped in usual sterile fashion            Again, thank you for allowing me to participate in the care of your patient.      Sincerely,    Sterling Dia, DO

## 2023-12-06 RX ORDER — DICLOFENAC SODIUM 75 MG/1
75 TABLET, DELAYED RELEASE ORAL 2 TIMES DAILY PRN
Qty: 20 TABLET | Refills: 2 | Status: SHIPPED | OUTPATIENT
Start: 2023-12-06 | End: 2024-03-04

## 2024-02-27 DIAGNOSIS — M20.21 ACQUIRED HALLUX RIGIDUS, RIGHT: ICD-10-CM

## 2024-03-02 NOTE — TELEPHONE ENCOUNTER
diclofenac (VOLTAREN) 75 MG EC tablet   20 tablet 2 12/6/2023 12/5/2023  [facility] Sports Medicine [facility]  Sterling Dia DO  Sports Medicine    Routed because:  NSAID Medications [id number]/27/2024 04:49 PM   Protocol Details Blood pressure under 140/90 in past 12 months    Normal ALT on file in past 12 months    Normal AST on file in past 12 months    Patient is age 6-64 years    Normal CBC on file in past 12 months    Always Fail Criteria - Chart Review Required

## 2024-03-04 RX ORDER — DICLOFENAC SODIUM 75 MG/1
TABLET, DELAYED RELEASE ORAL
Qty: 20 TABLET | Refills: 2 | Status: SHIPPED | OUTPATIENT
Start: 2024-03-04

## 2024-03-04 NOTE — TELEPHONE ENCOUNTER
[facility]    Phone Message    May a detailed message be left on voicemail: yes     Reason for Call: Other: Pt is calling for an update on the refill request put in on 2/27     Action Taken: Other: [facility] sports [facility]    Travel Screening: Not Applicable

## 2024-03-15 ENCOUNTER — OFFICE VISIT (OUTPATIENT)
Dept: ORTHOPEDICS | Facility: CLINIC | Age: 69
End: 2024-03-15
Payer: COMMERCIAL

## 2024-03-15 VITALS — WEIGHT: 204 LBS | BODY MASS INDEX: 28.56 KG/M2 | HEIGHT: 71 IN

## 2024-03-15 DIAGNOSIS — M20.21 ACQUIRED HALLUX RIGIDUS, RIGHT: Primary | ICD-10-CM

## 2024-03-15 PROCEDURE — 99207 PR DROP WITH A PROCEDURE: CPT | Performed by: FAMILY MEDICINE

## 2024-03-15 PROCEDURE — 20604 DRAIN/INJ JOINT/BURSA W/US: CPT | Mod: RT | Performed by: FAMILY MEDICINE

## 2024-03-15 RX ORDER — TRIAMCINOLONE ACETONIDE 40 MG/ML
20 INJECTION, SUSPENSION INTRA-ARTICULAR; INTRAMUSCULAR
Status: SHIPPED | OUTPATIENT
Start: 2024-03-15

## 2024-03-15 RX ORDER — LIDOCAINE HYDROCHLORIDE 10 MG/ML
0.5 INJECTION, SOLUTION EPIDURAL; INFILTRATION; INTRACAUDAL; PERINEURAL
Status: SHIPPED | OUTPATIENT
Start: 2024-03-15

## 2024-03-15 RX ADMIN — LIDOCAINE HYDROCHLORIDE 0.5 ML: 10 INJECTION, SOLUTION EPIDURAL; INFILTRATION; INTRACAUDAL; PERINEURAL at 12:45

## 2024-03-15 RX ADMIN — TRIAMCINOLONE ACETONIDE 20 MG: 40 INJECTION, SUSPENSION INTRA-ARTICULAR; INTRAMUSCULAR at 12:45

## 2024-03-15 NOTE — PROGRESS NOTES
PROCEDURE ENCOUNTER    [facility]  Orthopedics  Sterling Dia DO  March 15, 2024     Name: Paul Roland  MRN: [id number]  Age: 69 year old  : 1955     Referring provider: HEMANT  Diagnosis: Hallux Rigidus of Right Great Toe     First Metatarsophalangeal joint Injection- Ultrasound Guided     The patient was informed of the risks and the benefits of the procedure and a written consent was signed.     The patient s Right great toe was prepped with chlorhexidine in sterile fashion.      20 mg of kenalog suspension was drawn up into a 3 mL syringe with 0.5 mL of 1% lidocaine.     Injection was performed using sterile technique.  Under ultrasound guidance a 1.5-inch 25-gauge needle was used to enter the 1st MTP joint of the right great toe.  Needle placement was visualized and documented with ultrasound.  Needle placed in short axis to the probe.  Ultrasound visualization was necessary due to decreased joint space in the setting of osteoarthritis.  Images were permanently stored for the patient's record. There were no complications. The patient tolerated the procedure well. There was negligible bleeding.     The patient was instructed to ice the toe upon leaving clinic and refrain from overuse over the next 3 days.      The patient was instructed to call or go to the emergency room with any unusual pain, swelling, redness, or if otherwise concerned.    Small Joint Injection: R great MTP    Date/Time: 3/15/2024 12:45 PM    Performed by: Sterling Dia DO  Authorized by: Sterling Dia DO  Indications:  Pain  Needle Size:  25 G  Guidance: ultrasound     Approach:  Dorsal  Location:  Great toe    Site:  R great MTP                    Medications:  20 mg triamcinolone 40 MG/ML; 0.5 mL lidocaine (PF) 1 %        Outcome:  Tolerated well, no immediate complications  Procedure discussed: discussed risks, benefits, and alternatives    Consent Given by:  Patient  Timeout: timeout called immediately prior to procedure     Prep: patient was prepped and draped in usual sterile fashion      Sterling Dia DO CAQSM  Primary Care Sports Medicine  [facility]

## 2024-03-15 NOTE — NURSING NOTE
[address] [address]  Dept: [phone]  ______________________________________________________________________________    Patient: Paul Roland   : 1955   MRN: [id number]   March 15, 2024    INVASIVE PROCEDURE SAFETY CHECKLIST    Date: 3/15/24   Procedure: Right 1st MTP USG kenalog injection  Patient Name: Paul Roland  MRN: [id number]  YOB: 1955    Action: Complete sections as appropriate. Any discrepancy results in a HARD COPY until resolved.     PRE PROCEDURE:  Patient ID verified with 2 identifiers (name and  or MRN): Yes  Procedure and site verified with patient/designee (when able): Yes  Accurate consent documentation in medical record: Yes  H&P (or appropriate assessment) documented in medical record: Yes  H&P must be up to 20 days prior to procedure and updates within 24 hours of procedure as applicable: NA  Relevant diagnostic and radiology test results appropriately labeled and displayed as applicable: Yes  Procedure site(s) marked with provider initials: NA    TIMEOUT:  Time-Out performed immediately prior to starting procedure, including verbal and active participation of all team members addressing the following:Yes  * Correct patient identify  * Confirmed that the correct side and site are marked  * An accurate procedure consent form  * Agreement on the procedure to be done  * Correct patient position  * Relevant images and results are properly labeled and appropriately displayed  * The need to administer antibiotics or fluids for irrigation purposes during the procedure as applicable   * Safety precautions based on patient history or medication use    DURING PROCEDURE: Verification of correct person, site, and procedures any time the responsibility for care of the patient is transferred to another member of the care team.       Prior to injection, verified patient identity using patient's name and date of  birth.  Due to injection administration, patient instructed to remain in clinic for 15 minutes  afterwards, and to report any adverse reaction to me immediately.    Joint injection was performed.      Drug Amount Wasted:  Yes: 5 mg/ml   Vial/Syringe: Single dose vial  Expiration Date:  05/2027      Aileen Nieto, KAY  March 15, 2024

## 2024-03-15 NOTE — LETTER
3/15/2024      RE: Paul Roland  [address]  [address]  [place] [address]     Dear Colleague,    Thank you for referring your patient, Paul Roland, to the [facility]IN[facility] [place]. Please see a copy of my visit note below.    PROCEDURE ENCOUNTER    [facility]  Orthopedics  Sterling Dia DO  March 15, 2024     Name: Paul Roland  MRN: [id number]  Age: 69 year old  : 1955     Referring provider: HEMANT  Diagnosis: Hallux Rigidus of Right Great Toe     First Metatarsophalangeal joint Injection- Ultrasound Guided     The patient was informed of the risks and the benefits of the procedure and a written consent was signed.     The patient s Right great toe was prepped with chlorhexidine in sterile fashion.      20 mg of kenalog suspension was drawn up into a 3 mL syringe with 0.5 mL of 1% lidocaine.     Injection was performed using sterile technique.  Under ultrasound guidance a 1.5-inch 25-gauge needle was used to enter the 1st MTP joint of the right great toe.  Needle placement was visualized and documented with ultrasound.  Needle placed in short axis to the probe.  Ultrasound visualization was necessary due to decreased joint space in the setting of osteoarthritis.  Images were permanently stored for the patient's record. There were no complications. The patient tolerated the procedure well. There was negligible bleeding.     The patient was instructed to ice the toe upon leaving clinic and refrain from overuse over the next 3 days.      The patient was instructed to call or go to the emergency room with any unusual pain, swelling, redness, or if otherwise concerned.    Small Joint Injection: R great MTP    Date/Time: 3/15/2024 12:45 PM    Performed by: Sterling Dia DO  Authorized by: Sterling Dia DO  Indications:  Pain  Needle Size:  25 G  Guidance: ultrasound     Approach:  Dorsal  Location:  Great toe    Site:  R great MTP                     Medications:  20 mg triamcinolone 40 MG/ML; 0.5 mL lidocaine (PF) 1 %        Outcome:  Tolerated well, no immediate complications  Procedure discussed: discussed risks, benefits, and alternatives    Consent Given by:  Patient  Timeout: timeout called immediately prior to procedure    Prep: patient was prepped and draped in usual sterile fashion      Sterling Dia DO [facility]  Primary Care Sports Medicine  [facility]      Again, thank you for allowing me to participate in the care of your patient.      Sincerely,    Sterling Dia DO

## 2024-05-14 DIAGNOSIS — M20.21 ACQUIRED HALLUX RIGIDUS, RIGHT: ICD-10-CM

## 2024-05-20 ENCOUNTER — TELEPHONE (OUTPATIENT)
Dept: ORTHOPEDICS | Facility: CLINIC | Age: 69
End: 2024-05-20
Payer: COMMERCIAL

## 2024-05-20 DIAGNOSIS — M20.21 ACQUIRED HALLUX RIGIDUS, RIGHT: Primary | ICD-10-CM

## 2024-05-20 RX ORDER — DICLOFENAC SODIUM 75 MG/1
75 TABLET, DELAYED RELEASE ORAL 2 TIMES DAILY
Qty: 20 TABLET | Refills: 2 | Status: SHIPPED | OUTPATIENT
Start: 2024-05-20 | End: 2024-07-12

## 2024-05-20 NOTE — TELEPHONE ENCOUNTER
Medication Refill Request    Has the patient contacted the pharmacy for the refill? Yes   Name of medication being requested:   diclofenac (VOLTAREN) 75 MG EC tablet     Provider who prescribed the medication: Dr. Dia  Pharmacy:   [facility]/PHARMACY #[id number] - [place], [place] - [address]     Date medication is needed: 5/20       Could we send this information to you in [employer] or would you prefer to receive a phone call?:   Patient would prefer a phone call   Okay to leave a detailed message?: Yes at Cell number on file:    Telephone Information:   Mobile [phone]

## 2024-05-23 RX ORDER — DICLOFENAC SODIUM 75 MG/1
TABLET, DELAYED RELEASE ORAL
Qty: 20 TABLET | Refills: 2 | OUTPATIENT
Start: 2024-05-23

## 2024-06-20 DIAGNOSIS — N52.01 ERECTILE DYSFUNCTION DUE TO ARTERIAL INSUFFICIENCY: Primary | ICD-10-CM

## 2024-06-20 NOTE — TELEPHONE ENCOUNTER
Scheduled patient to a sooner appointment with provider.   6/21/24 at 9 am   Patient aware  Rizwana Maier, RN, BSN  Care Coordinator Urology  [facility] [place]  Urology [facility]  [phone]

## 2024-06-21 ENCOUNTER — OFFICE VISIT (OUTPATIENT)
Dept: UROLOGY | Facility: CLINIC | Age: 69
End: 2024-06-21
Payer: COMMERCIAL

## 2024-06-21 VITALS
DIASTOLIC BLOOD PRESSURE: 85 MMHG | HEIGHT: 72 IN | OXYGEN SATURATION: 100 % | WEIGHT: 200 LBS | SYSTOLIC BLOOD PRESSURE: 127 MMHG | HEART RATE: 54 BPM | BODY MASS INDEX: 27.09 KG/M2

## 2024-06-21 DIAGNOSIS — N52.01 ERECTILE DYSFUNCTION DUE TO ARTERIAL INSUFFICIENCY: ICD-10-CM

## 2024-06-21 PROCEDURE — 99213 OFFICE O/P EST LOW 20 MIN: CPT | Performed by: UROLOGY

## 2024-06-21 RX ORDER — IBUPROFEN 200 MG
TABLET ORAL
Qty: 24 EACH | Status: SHIPPED | OUTPATIENT
Start: 2024-06-21

## 2024-06-21 ASSESSMENT — PAIN SCALES - GENERAL: PAINLEVEL: NO PAIN (0)

## 2024-06-21 NOTE — NURSING NOTE
"Chief Complaint   Patient presents with    Consult     Here for a general check up        Blood pressure 127/85, pulse 54, height 1.816 m (5' 11.5\"), weight 90.7 kg (200 lb), SpO2 100%. Body mass index is 27.51 kg/m .    Patient Active Problem List   Diagnosis    Hyperlipidemia    ED (erectile dysfunction)    Cigarette nicotine dependence without complication    Essential hypertension       No Known Allergies    Current Outpatient Medications   Medication Sig Dispense Refill    COMPOUNDED NON-CONTROLLED SUBSTANCE (CMPD RX) - PHARMACY TO MIX COMPOUNDED MEDICATION Trimix # 4 Papaverine 27.4 mg/mL Phentolamine 1 mg/mL Alprostadil 40 mcg/mL Sig: inject 0.5mLs intracavernous daily as needed. Allow 24 hours between injections. Use no more than three time weekly May increase in increments of 0.1 mL up 0.8 mL 5 mL 3    diclofenac (VOLTAREN) 75 MG EC tablet Take 1 tablet (75 mg) by mouth 2 times daily 20 tablet 2    diclofenac (VOLTAREN) 75 MG EC tablet TAKE 1 TABLET BY MOUTH TWICE A DAY AS NEEDED FOR MODERATE PAIN 20 tablet 2    latanoprost (XALATAN) 0.005 % ophthalmic solution Place 1 drop into both eyes at bedtime         Social History     Tobacco Use    Smoking status: Former     Current packs/day: 0.00     Types: Cigarettes     Quit date: 10/10/2023     Years since quittin.6    Smokeless tobacco: Never   Vaping Use    Vaping status: Never Used   Substance Use Topics    Alcohol use: Yes     Comment: few drinks per day    Drug use: Never       Candido Pro  2024  8:57 AM     "

## 2024-06-21 NOTE — LETTER
"6/21/2024       RE: Paul Roland  [address]  [address]  [place] [address]     Dear Colleague,    Thank you for referring your patient, aPul Roland, to the [facility] [place] [facility]. Please see a copy of my visit note below.      Renew trimix.    Sudafed.          Mr. Vogt is a 69 year old male who presents to clinic today for follow-up of ED medication refill.    HPI     Paul Roalnd is a 69 year old male here to follow-up ED.  ED symptoms started after colorectal surgery.  Anejac secondary to TURP, also.    He uses Trimix #4.  This works so-so for him.    Discussed the option of IPP again, he has no interested in surgery at this time.    Denies any new lumps or curvature to the erections.    Review of Systems   CONSTITUTIONAL: NEGATIVE for fever, chills, change in weight  ENT/MOUTH: NEGATIVE for ear, mouth and throat problems  RESP: NEGATIVE for significant cough or SOB  CV: NEGATIVE for chest pain, palpitations or peripheral edema    /85   Pulse 54   Ht 1.816 m (5' 11.5\")   Wt 90.7 kg (200 lb)   SpO2 100%   BMI 27.51 kg/m      General: Alert, oriented, nad  Eyes: anicteric, EOMI.  Pulse: regular  Resps: normal, non-labored.  Abdomen:  nondistended.   exam deferred.       Assessment & Plan     Erectile dysfunction, unspecified erectile dysfunction type.  Likely due to disruption of pelvic nerves from CR surgery.  - refilled trimix #4 and syringes.  - return to clinic 1yr, sooner if needed.    Eliceo Damon MD  [facility] [place]      Additional Coding Information:    Problems:  3 -- one stable chronic illness    Data Reviewed  N/A     Level of risk:  4 -- prescription drug management    Time spent:  15 minutes spent on the date of the encounter doing chart review, history and exam, documentation and further activities as noted above.      "

## 2024-06-21 NOTE — PROGRESS NOTES
"  Renew trimix.    Sudafed.          Mr. Vogt is a 69 year old male who presents to clinic today for follow-up of ED medication refill.    HPI     Paul Roland is a 69 year old male here to follow-up ED.  ED symptoms started after colorectal surgery.  Anejac secondary to TURP, also.    He uses Trimix #4.  This works so-so for him.    Discussed the option of IPP again, he has no interested in surgery at this time.    Denies any new lumps or curvature to the erections.    Review of Systems   CONSTITUTIONAL: NEGATIVE for fever, chills, change in weight  ENT/MOUTH: NEGATIVE for ear, mouth and throat problems  RESP: NEGATIVE for significant cough or SOB  CV: NEGATIVE for chest pain, palpitations or peripheral edema    /85   Pulse 54   Ht 1.816 m (5' 11.5\")   Wt 90.7 kg (200 lb)   SpO2 100%   BMI 27.51 kg/m      General: Alert, oriented, nad  Eyes: anicteric, EOMI.  Pulse: regular  Resps: normal, non-labored.  Abdomen:  nondistended.   exam deferred.       Assessment & Plan     Erectile dysfunction, unspecified erectile dysfunction type.  Likely due to disruption of pelvic nerves from CR surgery.  - refilled trimix #4 and syringes.  - return to clinic 1yr, sooner if needed.    Eliceo Damon MD  [facility] [place]      Additional Coding Information:    Problems:  3 -- one stable chronic illness    Data Reviewed  N/A     Level of risk:  4 -- prescription drug management    Time spent:  15 minutes spent on the date of the encounter doing chart review, history and exam, documentation and further activities as noted above.                         "

## 2024-07-10 DIAGNOSIS — M20.21 ACQUIRED HALLUX RIGIDUS, RIGHT: ICD-10-CM

## 2024-07-11 NOTE — TELEPHONE ENCOUNTER
DICLOFENAC SOD EC 75 MG TAB       Last Written Prescription Date:  5-20-24  Last Fill Quantity: 20,   # refills: 2  Last Office Visit : 3-15-24  Future Office visit:  7-26-24    Routing refill request to provider for review/approval because:  Failed protocol: AGE  Overdue CBC

## 2024-07-12 RX ORDER — DICLOFENAC SODIUM 75 MG/1
75 TABLET, DELAYED RELEASE ORAL 2 TIMES DAILY
Qty: 20 TABLET | Refills: 2 | Status: SHIPPED | OUTPATIENT
Start: 2024-07-12 | End: 2024-07-26

## 2024-07-26 ENCOUNTER — OFFICE VISIT (OUTPATIENT)
Dept: ORTHOPEDICS | Facility: CLINIC | Age: 69
End: 2024-07-26
Payer: COMMERCIAL

## 2024-07-26 DIAGNOSIS — M20.21 ACQUIRED HALLUX RIGIDUS, RIGHT: Primary | ICD-10-CM

## 2024-07-26 PROCEDURE — 20604 DRAIN/INJ JOINT/BURSA W/US: CPT | Mod: RT | Performed by: FAMILY MEDICINE

## 2024-07-26 RX ORDER — TRIAMCINOLONE ACETONIDE 40 MG/ML
20 INJECTION, SUSPENSION INTRA-ARTICULAR; INTRAMUSCULAR
Status: SHIPPED | OUTPATIENT
Start: 2024-07-26

## 2024-07-26 RX ORDER — LIDOCAINE HYDROCHLORIDE 10 MG/ML
0.5 INJECTION, SOLUTION EPIDURAL; INFILTRATION; INTRACAUDAL; PERINEURAL
Status: SHIPPED | OUTPATIENT
Start: 2024-07-26

## 2024-07-26 RX ORDER — DICLOFENAC SODIUM 75 MG/1
75 TABLET, DELAYED RELEASE ORAL 2 TIMES DAILY
Qty: 20 TABLET | Refills: 2 | Status: SHIPPED | OUTPATIENT
Start: 2024-07-26

## 2024-07-26 RX ADMIN — TRIAMCINOLONE ACETONIDE 20 MG: 40 INJECTION, SUSPENSION INTRA-ARTICULAR; INTRAMUSCULAR at 13:59

## 2024-07-26 RX ADMIN — LIDOCAINE HYDROCHLORIDE 0.5 ML: 10 INJECTION, SOLUTION EPIDURAL; INFILTRATION; INTRACAUDAL; PERINEURAL at 13:59

## 2024-07-26 NOTE — PROGRESS NOTES
PROCEDURE ENCOUNTER    [facility]  Orthopedics  Sterling Dia DO  2024     Name: Paul Roland  MRN: [id number]  Age: 69 year old  : 1955    Referring provider: Self-referred  Diagnosis: Hallux rigidus of right foot    First Metatarsophalangeal joint Injection- Ultrasound Guided    The patient was informed of the risks and the benefits of the procedure and a written consent was signed.    The patient s right great toe was prepped with chlorhexidine in sterile fashion.     20 mg of kenalog suspension was drawn up into a 3 mL syringe with 0.5 mL of 1% lidocaine.    Injection was performed using sterile technique.  Under ultrasound guidance a 1.5-inch 25-gauge needle was used to enter the 1st MTP joint of the right great toe.  Needle placement was visualized and documented with ultrasound.  Needle placed in short axis to the probe.  Ultrasound visualization was necessary due to decreased joint space in the setting of osteoarthritis.  Images were permanently stored for the patient's record. There were no complications. The patient tolerated the procedure well. There was negligible bleeding.    The patient was instructed to ice the toe upon leaving clinic and refrain from overuse over the next 3 days.     Diclofenac refilled.    The patient was instructed to call or go to the emergency room with any unusual pain, swelling, redness, or if otherwise concerned.      Sterling Dia DO [facility]  Primary Care Sports Medicine  [facility]    Small Joint Injection/Arthrocentesis: R great MTP    Date/Time: 2024 1:59 PM    Performed by: Sterling Dia DO  Authorized by: Sterling Dia DO    Needle Size:  25 G  Guidance: ultrasound     Approach:  Medial  Location:  Great toe    Site:  R great MTP                    Medications:  20 mg triamcinolone 40 MG/ML; 0.5 mL lidocaine (PF) 1 %

## 2024-07-26 NOTE — LETTER
2024      RE: Paul Roland  [address]  [address]  [place] [address]     Dear Colleague,    Thank you for referring your patient, Paul Roland, to the [facility] MEDICINE CLIN[facility] [place]. Please see a copy of my visit note below.    PROCEDURE ENCOUNTER    [facility]  Orthopedics  Sterling Dia DO  2024     Name: Paul Roland  MRN: [id number]  Age: 69 year old  : 1955    Referring provider: Self-referred  Diagnosis: Hallux rigidus of right foot    First Metatarsophalangeal joint Injection- Ultrasound Guided    The patient was informed of the risks and the benefits of the procedure and a written consent was signed.    The patient s right great toe was prepped with chlorhexidine in sterile fashion.     20 mg of kenalog suspension was drawn up into a 3 mL syringe with 0.5 mL of 1% lidocaine.    Injection was performed using sterile technique.  Under ultrasound guidance a 1.5-inch 25-gauge needle was used to enter the 1st MTP joint of the right great toe.  Needle placement was visualized and documented with ultrasound.  Needle placed in short axis to the probe.  Ultrasound visualization was necessary due to decreased joint space in the setting of osteoarthritis.  Images were permanently stored for the patient's record. There were no complications. The patient tolerated the procedure well. There was negligible bleeding.    The patient was instructed to ice the toe upon leaving clinic and refrain from overuse over the next 3 days.     Diclofenac refilled.    The patient was instructed to call or go to the emergency room with any unusual pain, swelling, redness, or if otherwise concerned.      Sterling Dia DO CAM  Primary Care Sports Medicine  [facility]    Small Joint Injection/Arthrocentesis: R great MTP    Date/Time: 2024 1:59 PM    Performed by: Sterling Dia DO  Authorized by: Sterling Dia DO    Needle Size:  25  G  Guidance: ultrasound     Approach:  Medial  Location:  Great toe    Site:  R great MTP                    Medications:  20 mg triamcinolone 40 MG/ML; 0.5 mL lidocaine (PF) 1 %                            Again, thank you for allowing me to participate in the care of your patient.      Sincerely,    Sterling Dia DO

## 2024-07-26 NOTE — NURSING NOTE
[address] [address]  Dept: [phone]  ______________________________________________________________________________    Patient: Paul Roland   : 1955   MRN: [id number]   2024    INVASIVE PROCEDURE SAFETY CHECKLIST    Date: 2024   Procedure: Right great toe MTP joint USGI  Patient Name: Paul Roland  MRN: [id number]  YOB: 1955    Action: Complete sections as appropriate. Any discrepancy results in a HARD COPY until resolved.     PRE PROCEDURE:  Patient ID verified with 2 identifiers (name and  or MRN): Yes  Procedure and site verified with patient/designee (when able): Yes  Accurate consent documentation in medical record: Yes  H&P (or appropriate assessment) documented in medical record: Yes  H&P must be up to 20 days prior to procedure and updates within 24 hours of procedure as applicable: NA  Relevant diagnostic and radiology test results appropriately labeled and displayed as applicable: Yes  Procedure site(s) marked with provider initials: NA    TIMEOUT:  Time-Out performed immediately prior to starting procedure, including verbal and active participation of all team members addressing the following:Yes  * Correct patient identify  * Confirmed that the correct side and site are marked  * An accurate procedure consent form  * Agreement on the procedure to be done  * Correct patient position  * Relevant images and results are properly labeled and appropriately displayed  * The need to administer antibiotics or fluids for irrigation purposes during the procedure as applicable   * Safety precautions based on patient history or medication use    DURING PROCEDURE: Verification of correct person, site, and procedures any time the responsibility for care of the patient is transferred to another member of the care team.       Prior to injection, verified patient identity using patient's name and date of  birth.  Due to injection administration, patient instructed to remain in clinic for 15 minutes  afterwards, and to report any adverse reaction to me immediately.    Joint injection was performed.      Drug Amount Wasted:  Yes: 4.5 mg/ml  lidocaine and .5 kenalog  Vial/Syringe: Single dose vial  Expiration Date:  lidocaine 05/2027 and 03/2026 nba Del Toro, KAY  July 26, 2024

## 2024-10-08 ENCOUNTER — TELEPHONE (OUTPATIENT)
Dept: ORTHOPEDICS | Facility: CLINIC | Age: 69
End: 2024-10-08
Payer: COMMERCIAL

## 2024-10-08 DIAGNOSIS — M20.21 ACQUIRED HALLUX RIGIDUS, RIGHT: ICD-10-CM

## 2024-10-08 RX ORDER — DICLOFENAC SODIUM 75 MG/1
75 TABLET, DELAYED RELEASE ORAL DAILY
Qty: 30 TABLET | Refills: 2 | Status: SHIPPED | OUTPATIENT
Start: 2024-10-08

## 2024-10-08 NOTE — TELEPHONE ENCOUNTER
Other: Requesting c/b ASAP- wants to talk w/Dr Dia's team regarding prescription refill     Could we send this information to you in [email or web address] or would you prefer to receive a phone call?:   Patient would prefer a phone call   Okay to leave a detailed message?: No at Cell number on file:    Telephone Information:   Mobile [phone]

## 2024-10-09 NOTE — TELEPHONE ENCOUNTER
Spoke to Paul and informed him that the prescription was filled. He appreciated the call back.          Hood Edmond M.A., LAT, ATC  Certified Athletic Trainer

## 2024-11-19 ENCOUNTER — OFFICE VISIT (OUTPATIENT)
Dept: ORTHOPEDICS | Facility: CLINIC | Age: 69
End: 2024-11-19
Payer: COMMERCIAL

## 2024-11-19 DIAGNOSIS — M20.21 ACQUIRED HALLUX RIGIDUS, RIGHT: Primary | ICD-10-CM

## 2024-11-19 RX ORDER — LIDOCAINE HYDROCHLORIDE 10 MG/ML
0.5 INJECTION, SOLUTION EPIDURAL; INFILTRATION; INTRACAUDAL; PERINEURAL
Status: COMPLETED | OUTPATIENT
Start: 2024-11-19 | End: 2024-11-19

## 2024-11-19 RX ORDER — TRIAMCINOLONE ACETONIDE 40 MG/ML
20 INJECTION, SUSPENSION INTRA-ARTICULAR; INTRAMUSCULAR
Status: COMPLETED | OUTPATIENT
Start: 2024-11-19 | End: 2024-11-19

## 2024-11-19 RX ADMIN — LIDOCAINE HYDROCHLORIDE 0.5 ML: 10 INJECTION, SOLUTION EPIDURAL; INFILTRATION; INTRACAUDAL; PERINEURAL at 15:17

## 2024-11-19 RX ADMIN — TRIAMCINOLONE ACETONIDE 20 MG: 40 INJECTION, SUSPENSION INTRA-ARTICULAR; INTRAMUSCULAR at 15:17

## 2024-11-19 NOTE — NURSING NOTE
[address] [address]  Dept: [phone]  ______________________________________________________________________________    Patient: Paul Roland   : 1955   MRN: [id number]   2024    INVASIVE PROCEDURE SAFETY CHECKLIST    Date: 2024   Procedure:R Great Toe USG CSI   Patient Name: Paul Roland  MRN: [id number]  YOB: 1955    Action: Complete sections as appropriate. Any discrepancy results in a HARD COPY until resolved.     PRE PROCEDURE:  Patient ID verified with 2 identifiers (name and  or MRN): Yes  Procedure and site verified with patient/designee (when able): Yes  Accurate consent documentation in medical record: Yes  H&P (or appropriate assessment) documented in medical record: Yes  H&P must be up to 20 days prior to procedure and updates within 24 hours of procedure as applicable: Yes  Relevant diagnostic and radiology test results appropriately labeled and displayed as applicable: Yes  Procedure site(s) marked with provider initials: NA    TIMEOUT:  Time-Out performed immediately prior to starting procedure, including verbal and active participation of all team members addressing the following:Yes  * Correct patient identify  * Confirmed that the correct side and site are marked  * An accurate procedure consent form  * Agreement on the procedure to be done  * Correct patient position  * Relevant images and results are properly labeled and appropriately displayed  * The need to administer antibiotics or fluids for irrigation purposes during the procedure as applicable   * Safety precautions based on patient history or medication use    DURING PROCEDURE: Verification of correct person, site, and procedures any time the responsibility for care of the patient is transferred to another member of the care team.       Prior to injection, verified patient identity using patient's name and date of  birth.  Due to injection administration, patient instructed to remain in clinic for 15 minutes  afterwards, and to report any adverse reaction to me immediately.    Joint injection was performed.      Drug Amount Wasted:  Yes: .5 mg/ml   Vial/Syringe: Single dose vial  Expiration Date:  7/1/26 4/1/28      Rita Hou, [facility]  November 19, 2024

## 2024-11-19 NOTE — PROGRESS NOTES
PROCEDURE ENCOUNTER    [facility]  Orthopedics  Sterling Dia DO  2024     Name: Paul Roland  MRN: [id number]  Age: 69 year old  : 1955    Referring provider: HEMANT  Diagnosis: Hallux rigidus of right foot    First Metatarsophalangeal joint Injection- Ultrasound Guided    The patient was informed of the risks and the benefits of the procedure and a written consent was signed.    The patient s right great toe was prepped with chlorhexidine in sterile fashion.     20 mg of methylprednisolone suspension was drawn up into a 3 mL syringe with 0.5 mL of 1% lidocaine.    Injection was performed using sterile technique.  Under ultrasound guidance a 1.5-inch 25-gauge needle was used to enter the 1st MTP joint of the right great toe.  Needle placement was visualized and documented with ultrasound.  Needle placed in short axis to the probe.  Ultrasound visualization was necessary due to decreased joint space in the setting of osteoarthritis.  Images were permanently stored for the patient's record. There were no complications. The patient tolerated the procedure well. There was negligible bleeding.    The patient was instructed to ice the toe upon leaving clinic and refrain from overuse over the next 3 days.     The patient was instructed to call or go to the emergency room with any unusual pain, swelling, redness, or if otherwise concerned.    Sterling Dia DO CAM  Primary Care Sports Medicine  [facility]      Small Joint Injection/Arthrocentesis: R great MTP    Date/Time: 2024 3:17 PM    Performed by: Sterling Dia DO  Authorized by: Sterling Dia DO  Indications:  Pain  Needle Size:  25 G  Guidance: ultrasound     Approach:  Dorsal  Location:  Great toe    Site:  R great MTP                    Medications:  20 mg triamcinolone 40 MG/ML; 0.5 mL lidocaine (PF) 1 %        Outcome:  Tolerated well, no immediate complications  Procedure discussed: discussed risks,  benefits, and alternatives    Consent Given by:  Patient  Timeout: timeout called immediately prior to procedure    Prep: patient was prepped and draped in usual sterile fashion

## 2024-11-19 NOTE — LETTER
2024      RE: Paul Roland  [address]  [address]  [place] [address]     Dear Colleague,    Thank you for referring your patient, Paul Roland, to the [facility] MEDICINE CL[facility] [place]. Please see a copy of my visit note below.    PROCEDURE ENCOUNTER    [facility]  Orthopedics  Sterling Dia DO  2024     Name: Paul Roland  MRN: [id number]  Age: 69 year old  : 1955    Referring provider: HEMANT  Diagnosis: Hallux rigidus of right foot    First Metatarsophalangeal joint Injection- Ultrasound Guided    The patient was informed of the risks and the benefits of the procedure and a written consent was signed.    The patient s right great toe was prepped with chlorhexidine in sterile fashion.     20 mg of methylprednisolone suspension was drawn up into a 3 mL syringe with 0.5 mL of 1% lidocaine.    Injection was performed using sterile technique.  Under ultrasound guidance a 1.5-inch 25-gauge needle was used to enter the 1st MTP joint of the right great toe.  Needle placement was visualized and documented with ultrasound.  Needle placed in short axis to the probe.  Ultrasound visualization was necessary due to decreased joint space in the setting of osteoarthritis.  Images were permanently stored for the patient's record. There were no complications. The patient tolerated the procedure well. There was negligible bleeding.    The patient was instructed to ice the toe upon leaving clinic and refrain from overuse over the next 3 days.     The patient was instructed to call or go to the emergency room with any unusual pain, swelling, redness, or if otherwise concerned.    Sterling Dia DO CAQSM  Primary Care Sports Medicine  [facility]      Small Joint Injection/Arthrocentesis: R great MTP    Date/Time: 2024 3:17 PM    Performed by: tSerling Dia DO  Authorized by: Sterling Dia DO  Indications:  Pain  Needle Size:  25  G  Guidance: ultrasound     Approach:  Dorsal  Location:  Great toe    Site:  R great MTP                    Medications:  20 mg triamcinolone 40 MG/ML; 0.5 mL lidocaine (PF) 1 %        Outcome:  Tolerated well, no immediate complications  Procedure discussed: discussed risks, benefits, and alternatives    Consent Given by:  Patient  Timeout: timeout called immediately prior to procedure    Prep: patient was prepped and draped in usual sterile fashion            Again, thank you for allowing me to participate in the care of your patient.      Sincerely,    Sterling Dia, DO

## 2024-12-22 ENCOUNTER — HEALTH MAINTENANCE LETTER (OUTPATIENT)
Age: 69
End: 2024-12-22

## 2025-02-17 ENCOUNTER — PATIENT OUTREACH (OUTPATIENT)
Dept: CARE COORDINATION | Facility: CLINIC | Age: 70
End: 2025-02-17
Payer: COMMERCIAL

## 2025-02-19 ENCOUNTER — PATIENT OUTREACH (OUTPATIENT)
Dept: CARE COORDINATION | Facility: CLINIC | Age: 70
End: 2025-02-19
Payer: COMMERCIAL

## 2025-03-26 DIAGNOSIS — M20.21 ACQUIRED HALLUX RIGIDUS, RIGHT: ICD-10-CM

## 2025-03-31 RX ORDER — DICLOFENAC SODIUM 75 MG/1
75 TABLET, DELAYED RELEASE ORAL DAILY
Qty: 30 TABLET | Refills: 2 | Status: SHIPPED | OUTPATIENT
Start: 2025-03-31

## 2025-03-31 NOTE — TELEPHONE ENCOUNTER
Last Written Prescription:  diclofenac (VOLTAREN) 75 MG EC tablet 30 tablet 2 1/3/2025 -- No   Sig - Route: TAKE 1 TABLET BY MOUTH EVERY DAY - Oral     ----------------------  Last Visit Date: 2-14-25  Future Visit Date: none  ----------------------    Also on med list:   diclofenac (VOLTAREN) 75 MG EC tablet 20 tablet 2 3/4/2024 -- No   Sig: TAKE 1 TABLET BY MOUTH TWICE A DAY AS NEEDED FOR MODERATE PAIN     Refill decision: Medication unable to be refilled by RN due to: Other:  Failed protocol: AGE  FYI overdue lab: CBC  2 rx on med list: different directions        Request from pharmacy:  Requested Prescriptions   Pending Prescriptions Disp Refills    diclofenac (VOLTAREN) 75 MG EC tablet [Pharmacy Med Name: DICLOFENAC SOD EC 75 MG TAB] 30 tablet 2     Sig: TAKE 1 TABLET BY MOUTH EVERY DAY       NSAID Medications Failed - 3/31/2025  2:58 PM        Failed - Patient is age 6-64 years        Failed - Normal CBC on file in past 12 months     Recent Labs   Lab Test 10/18/23  1636   WBC 3.9*   RBC 4.99   HGB 15.1   HCT 45.6                    Failed - Always Fail Criteria - Chart Review Required     Validate Diagnosis. If the medication is requested for an acute flare of a chronic pain associated with a musculoskeletal or rheumatologic condition; okay to authorize if all other criteria are met. If not, then forward to provider for review.          Failed - Normal GFR on file in past 12 months     Recent Labs   Lab Test 10/18/23  1636 07/20/21  1502 06/17/20  1442   GFRESTIMATED 81   < > 78   GFRESTBLACK  --   --  >90    < > = values in this interval not displayed.             Passed - Most recent blood pressure under 140/90 in past 12 months     BP Readings from Last 3 Encounters:   06/21/24 127/85   10/18/23 (!) 146/84   03/21/23 (!) 151/98       No data recorded            Passed - Medication is active on med list and the sig matches. RN to manually verify dose and sig if red X/fail.     If the protocol passes  (green check), you do not need to verify med dose and sig.    A prescription matches if they are the same clinical intention.    For Example: once daily and every morning are the same.    The protocol can not identify upper and lower case letters as matching and will fail.     For Example: Take 1 tablet (50 mg) by mouth daily     TAKE 1 TABLET (50 MG) BY MOUTH DAILY    For all fails (red x), verify dose and sig.    If the refill does match what is on file, the RN can still proceed to approve the refill request.       If they do not match, route to the appropriate provider.             Passed - Recent (12 mo) or future (90 days) visit within the authorizing provider's specialty     The patient must have completed an in-person or virtual visit within the past 12 months or has a future visit scheduled within the next 90 days with the authorizing provider s specialty.  Urgent care and e-visits do not qualify as an office visit for this protocol.

## 2025-05-06 ENCOUNTER — OFFICE VISIT (OUTPATIENT)
Dept: FAMILY MEDICINE | Facility: CLINIC | Age: 70
End: 2025-05-06
Payer: COMMERCIAL

## 2025-05-06 VITALS
HEART RATE: 71 BPM | OXYGEN SATURATION: 98 % | BODY MASS INDEX: 28.14 KG/M2 | RESPIRATION RATE: 14 BRPM | HEIGHT: 71 IN | TEMPERATURE: 97.1 F | DIASTOLIC BLOOD PRESSURE: 80 MMHG | SYSTOLIC BLOOD PRESSURE: 122 MMHG | WEIGHT: 201 LBS

## 2025-05-06 DIAGNOSIS — F17.210 CIGARETTE NICOTINE DEPENDENCE WITHOUT COMPLICATION: ICD-10-CM

## 2025-05-06 DIAGNOSIS — I10 ESSENTIAL HYPERTENSION: ICD-10-CM

## 2025-05-06 DIAGNOSIS — Z12.5 SCREENING PSA (PROSTATE SPECIFIC ANTIGEN): ICD-10-CM

## 2025-05-06 DIAGNOSIS — Z12.11 SCREEN FOR COLON CANCER: ICD-10-CM

## 2025-05-06 DIAGNOSIS — Z00.00 ENCOUNTER FOR MEDICARE ANNUAL WELLNESS EXAM: Primary | ICD-10-CM

## 2025-05-06 DIAGNOSIS — E78.5 HYPERLIPIDEMIA, UNSPECIFIED HYPERLIPIDEMIA TYPE: ICD-10-CM

## 2025-05-06 DIAGNOSIS — Z85.048 HISTORY OF RECTAL CANCER: ICD-10-CM

## 2025-05-06 LAB
ANION GAP SERPL CALCULATED.3IONS-SCNC: 12 MMOL/L (ref 7–15)
BUN SERPL-MCNC: 8.4 MG/DL (ref 8–23)
CALCIUM SERPL-MCNC: 10.1 MG/DL (ref 8.8–10.4)
CHLORIDE SERPL-SCNC: 103 MMOL/L (ref 98–107)
CHOLEST SERPL-MCNC: 285 MG/DL
CREAT SERPL-MCNC: 1.03 MG/DL (ref 0.67–1.17)
EGFRCR SERPLBLD CKD-EPI 2021: 77 ML/MIN/1.73M2
FASTING STATUS PATIENT QL REPORTED: YES
FASTING STATUS PATIENT QL REPORTED: YES
GLUCOSE SERPL-MCNC: 98 MG/DL (ref 70–99)
HCO3 SERPL-SCNC: 25 MMOL/L (ref 22–29)
HDLC SERPL-MCNC: 74 MG/DL
LDLC SERPL CALC-MCNC: 180 MG/DL
NONHDLC SERPL-MCNC: 211 MG/DL
POTASSIUM SERPL-SCNC: 5.3 MMOL/L (ref 3.4–5.3)
SODIUM SERPL-SCNC: 140 MMOL/L (ref 135–145)
TRIGL SERPL-MCNC: 156 MG/DL

## 2025-05-06 PROCEDURE — G0103 PSA SCREENING: HCPCS | Performed by: FAMILY MEDICINE

## 2025-05-06 PROCEDURE — 3074F SYST BP LT 130 MM HG: CPT | Performed by: FAMILY MEDICINE

## 2025-05-06 PROCEDURE — 3079F DIAST BP 80-89 MM HG: CPT | Performed by: FAMILY MEDICINE

## 2025-05-06 PROCEDURE — G0439 PPPS, SUBSEQ VISIT: HCPCS | Performed by: FAMILY MEDICINE

## 2025-05-06 PROCEDURE — 80061 LIPID PANEL: CPT | Performed by: FAMILY MEDICINE

## 2025-05-06 PROCEDURE — 80048 BASIC METABOLIC PNL TOTAL CA: CPT | Performed by: FAMILY MEDICINE

## 2025-05-06 PROCEDURE — 36415 COLL VENOUS BLD VENIPUNCTURE: CPT | Performed by: FAMILY MEDICINE

## 2025-05-06 PROCEDURE — 1126F AMNT PAIN NOTED NONE PRSNT: CPT | Performed by: FAMILY MEDICINE

## 2025-05-06 PROCEDURE — G0296 VISIT TO DETERM LDCT ELIG: HCPCS | Performed by: FAMILY MEDICINE

## 2025-05-06 RX ORDER — METHYLCELLULOSE 2 G/19G
POWDER, FOR SOLUTION ORAL
COMMUNITY

## 2025-05-06 RX ORDER — HYDROMORPHONE HYDROCHLORIDE 2 MG/1
2 TABLET ORAL
COMMUNITY
Start: 2023-10-28 | End: 2025-05-22

## 2025-05-06 SDOH — HEALTH STABILITY: PHYSICAL HEALTH: ON AVERAGE, HOW MANY DAYS PER WEEK DO YOU ENGAGE IN MODERATE TO STRENUOUS EXERCISE (LIKE A BRISK WALK)?: 1 DAY

## 2025-05-06 ASSESSMENT — SOCIAL DETERMINANTS OF HEALTH (SDOH): HOW OFTEN DO YOU GET TOGETHER WITH FRIENDS OR RELATIVES?: TWICE A WEEK

## 2025-05-06 ASSESSMENT — PAIN SCALES - GENERAL: PAINLEVEL_OUTOF10: NO PAIN (0)

## 2025-05-06 NOTE — PROGRESS NOTES
"Preventive Care Visit  [facility]  Davion Kylah Sanon MD, MD, Family Medicine  May 6, 2025      Assessment & Plan     Encounter for Medicare annual wellness exam       Cigarette nicotine dependence without complication  Ongoing. Qualifies for lung cancer screening.   - Prof fee: Shared Decision Making for Lung Cancer Screening  - CT Chest Lung Cancer Scrn Low Dose wo; Future    Hyperlipidemia, unspecified hyperlipidemia type  Very high ascvd risk. Sent statin rx with his current lipid result.   - Lipid panel reflex to direct LDL Non-fasting; Future  - Lipid panel reflex to direct LDL Non-fasting    Essential hypertension  Patient is tolerating current medication without any major side effects of concerns and current dose seems reasonable too.  Current medication regime is effective. Continue current treatment without any changes.   - BASIC METABOLIC PANEL; Future  - BASIC METABOLIC PANEL    Screen for colon cancer   With history of rectal cancer.   - Colonoscopy Screening  Referral; Future    Screening PSA (prostate specific antigen)     - PSA, screen; Future  - PSA, screen            Nicotine/Tobacco Cessation  He reports that he has been smoking cigarettes. He has never used smokeless tobacco.  Nicotine/Tobacco Cessation Plan  Information offered: Patient not interested at this time  Self help information given to patient      BMI  Estimated body mass index is 28.03 kg/m  as calculated from the following:    Height as of this encounter: 1.803 m (5' 11\").    Weight as of this encounter: 91.2 kg (201 lb).       Counseling  Appropriate preventive services were addressed with this patient via screening, questionnaire, or discussion as appropriate for fall prevention, nutrition, physical activity, Tobacco-use cessation, social engagement, weight loss and cognition.  Checklist reviewing preventive services available has been given to the patient.  Reviewed patient's diet, " addressing concerns and/or questions.   He is at risk for lack of exercise and has been provided with information to increase physical activity for the benefit of his well-being.   I have reviewed Opioid Use Disorder and Substance Use Disorder risk factors and made any needed referrals.       The longitudinal plan of care for the diagnosis(es)/condition(s) as documented were addressed during this visit. Due to the added complexity in care, I will continue to support Paul in the subsequent management and with ongoing continuity of care.    Tesfaye Miller is a 70 year old, presenting for the following:  Annual Visit        5/6/2025     2:15 PM   Additional Questions   Roomed by Mary Venegas   Accompanied by Self           HPI    Had cervical spine surgery. Workmans comp, ongoing pain and some neuropathy and dealing with through workman comp. Tylenol and diclofenac daily. Hydromorphone as needed.     Digestion - irritated chronically. Can keep it regular with metamucil and miralax as needed.   Will be due for colonoscopy.     Smoking - 7 cigs per day.   Ups and down for years. Havent quit completely.   Started smoking when he was 30s. Always smoked around less than 1/2 pack for long time. After 41 - 1/2 pack per day.     Does not want shots today.            Advance Care Planning    Discussed advance care planning with patient; informed AVS has link to [facility]. PT has a living WILL.         5/6/2025   General Health   How would you rate your overall physical health? (!) FAIR   Feel stress (tense, anxious, or unable to sleep) Only a little   (!) STRESS CONCERN      5/6/2025   Nutrition   Diet: Regular (no restrictions)         5/6/2025   Exercise   Days per week of moderate/strenous exercise 1 day   (!) EXERCISE CONCERN      5/6/2025   Social Factors   Frequency of gathering with friends or relatives Twice a week   Worry food won't last until get money to buy more No   Food not last or not have enough money for  food? No   Do you have housing? (Housing is defined as stable permanent housing and does not include staying outside in a car, in a tent, in an abandoned building, in an overnight shelter, or couch-surfing.) Yes   Are you worried about losing your housing? No   Lack of transportation? No   Unable to get utilities (heat,electricity)? No         5/6/2025   Fall Risk   Fallen 2 or more times in the past year? No    Trouble with walking or balance? No        Proxy-reported          5/6/2025   Activities of Daily Living- Home Safety   Needs help with the following daily activites None of the above   Safety concerns in the home None of the above         5/6/2025   Dental   Dentist two times every year? Yes         5/6/2025   Hearing Screening   Hearing concerns? None of the above         5/6/2025   Driving Risk Screening   Patient/family members have concerns about driving No         5/6/2025   General Alertness/Fatigue Screening   Have you been more tired than usual lately? No         5/6/2025   Urinary Incontinence Screening   Bothered by leaking urine in past 6 months No         Today's PHQ-2 Score:       5/6/2025     2:04 PM   PHQ-2 ( 1999 [employer])   Q1: Little interest or pleasure in doing things 0   Q2: Feeling down, depressed or hopeless 0   PHQ-2 Score 0    Q1: Little interest or pleasure in doing things Not at all   Q2: Feeling down, depressed or hopeless Not at all   PHQ-2 Score 0       Patient-reported           5/6/2025   Substance Use   Alcohol more than 3/day or more than 7/wk No   Do you have a current opioid prescription? (!) YES   How severe/bad is pain from 1 to 10? 3/10   Do you use any other substances recreationally? No    (!) CANNABIS PRODUCTS       Multiple values from one day are sorted in reverse-chronological order          No data to display              Low Risk (0-3)  Moderate Risk (4-7)  High Risk (>8)  Social History     Tobacco Use    Smoking status: Former     Current packs/day: 0.00      Types: Cigarettes     Quit date: 10/10/2023     Years since quittin.5    Smokeless tobacco: Never   Vaping Use    Vaping status: Never Used   Substance Use Topics    Alcohol use: Yes     Comment: few drinks per day    Drug use: Never           2025   AAA Screening   Family history of Abdominal Aortic Aneurysm (AAA)? No   ASCVD Risk   The 10-year ASCVD risk score (Robson ELLIOTT, et al., 2019) is: 11.1%    Values used to calculate the score:      Age: 70 years      Sex: Male      Is Non- : Yes      Diabetic: No      Tobacco smoker: No      Systolic Blood Pressure: 122 mmHg      Is BP treated: No      HDL Cholesterol: 79 mg/dL      Total Cholesterol: 276 mg/dL            Reviewed and updated as needed this visit by Provider                      Current providers sharing in care for this patient include:  Patient Care Team:  Davion Sanon MD as PCP - General (Family Medicine)  Zoey Ye MD as MD (Colon and Rectal Surgery)  Sterling Dia DO as Assigned Musculoskeletal Provider  Rizwan Zhong OD Sheheitli, Huda, MD as MD (Ophthalmology)  Davion Sanon MD as Assigned PCP  Eliceo Damon MD as MD (Urology)  Eliceo Damon MD as Assigned Surgical Provider    The following health maintenance items are reviewed in Epic and correct as of today:  Health Maintenance   Topic Date Due    ZOSTER IMMUNIZATION (1 of 2) Never done    LUNG CANCER SCREENING  06/10/2021    Pneumococcal Vaccine: 50+ Years (2 of 2 - PCV) 2021    MEDICARE ANNUAL WELLNESS VISIT  2022    LIPID  2022    COVID-19 Vaccine ( season) 2024    BMP  10/18/2024    ANNUAL REVIEW OF HM ORDERS  10/18/2024    INFLUENZA VACCINE (Season Ended) 2025    COLORECTAL CANCER SCREENING  2025    FALL RISK ASSESSMENT  2026    DIABETES SCREENING  10/18/2026    RSV VACCINE (1 - 1-dose 75+ series) 2028    ADVANCE CARE PLANNING   "10/18/2028    DTAP/TDAP/TD IMMUNIZATION (2 - Td or Tdap) 06/17/2030    HEPATITIS C SCREENING  Completed    PHQ-2 (once per calendar year)  Completed    AORTIC ANEURYSM SCREENING (SYSTEM ASSIGNED)  Completed    HPV IMMUNIZATION  Aged Out    MENINGITIS IMMUNIZATION  Aged Out        Objective    Exam  /80 (BP Location: Right arm, Patient Position: Sitting, Cuff Size: Adult Regular)   Pulse 71   Temp 97.1  F (36.2  C) (Temporal)   Resp 14   Ht 1.803 m (5' 11\")   Wt 91.2 kg (201 lb)   SpO2 98%   BMI 28.03 kg/m     Estimated body mass index is 28.03 kg/m  as calculated from the following:    Height as of this encounter: 1.803 m (5' 11\").    Weight as of this encounter: 91.2 kg (201 lb).    Physical Exam  GENERAL: alert and no distress  EYES: Eyes grossly normal to inspection, PERRL and conjunctivae and sclerae normal  HENT: ear canals and TM's normal, nose and mouth without ulcers or lesions  NECK: no adenopathy, no asymmetry, masses, or scars  RESP: lungs clear to auscultation - no rales, rhonchi or wheezes  CV: regular rate and rhythm, normal S1 S2, no S3 or S4, no murmur, click or rub, no peripheral edema  ABDOMEN: soft, nontender, no hepatosplenomegaly, no masses and bowel sounds normal  MS: no gross musculoskeletal defects noted, no edema  SKIN: no suspicious lesions or rashes  NEURO: Normal strength and tone, mentation intact and speech normal  PSYCH: mentation appears normal, affect normal/bright         5/6/2025   Mini Cog   Clock Draw Score 2 Normal   3 Item Recall 3 objects recalled   Mini Cog Total Score 5              Signed Electronically by: Davion Sanon MD, MD    Lung Cancer Screening Shared Decision Making Visit     Paul Roland, a 70 year old male, is eligible for lung cancer screening    History   Smoking Status    Every Day    Types: Cigarettes   Smokeless Tobacco    Never       I have discussed with patient the risks and benefits of screening for lung cancer with low-dose " CT.     The risks include:    radiation exposure: one low dose chest CT has as much ionizing radiation as about 15 chest x-rays, or 6 months of background radiation living in [place]      false positives: most findings/nodules are NOT cancer, but some might still require additional diagnostic evaluation, including biopsy    over-diagnosis: some slow growing cancers that might never have been clinically significant will be detected and treated unnecessarily     The benefit of early detection of lung cancer is contingent upon adherence to annual screening or more frequent follow up if indicated.     Furthermore, to benefit from screening, [place] must be willing and able to undergo diagnostic procedures, if indicated. Although no specific guide is available for determining severity of comorbidities, it is reasonable to withhold screening in patients who have greater mortality risk from other diseases.     We did discuss that the best way to prevent lung cancer is to not smoke.    Some patients may value a numeric estimation of lung cancer risk when evaluating if lung cancer screening is right for them, here is one calculator:    ShouldIScreen

## 2025-05-06 NOTE — PATIENT INSTRUCTIONS
Patient Education   Preventive Care Advice   This is general advice given by our system to help you stay healthy. However, your care team may have specific advice just for you. Please talk to your care team about your preventive care needs.  Nutrition  Eat 5 or more servings of fruits and vegetables each day.  Try wheat bread, brown rice and whole grain pasta (instead of white bread, rice, and pasta).  Get enough calcium and vitamin D. Check the label on foods and aim for 100% of the RDA (recommended daily allowance).  Lifestyle  Exercise at least 150 minutes each week  (30 minutes a day, 5 days a week).  Do muscle strengthening activities 2 days a week. These help control your weight and prevent disease.  No smoking.  Wear sunscreen to prevent skin cancer.  Have a dental exam and cleaning every 6 months.  Yearly exams  See your health care team every year to talk about:  Any changes in your health.  Any medicines your care team has prescribed.  Preventive care, family planning, and ways to prevent chronic diseases.  Shots (vaccines)   HPV shots (up to age 26), if you've never had them before.  Hepatitis B shots (up to age 59), if you've never had them before.  COVID-19 shot: Get this shot when it's due.  Flu shot: Get a flu shot every year.  Tetanus shot: Get a tetanus shot every 10 years.  Pneumococcal, hepatitis A, and RSV shots: Ask your care team if you need these based on your risk.  Shingles shot (for age 50 and up)  General health tests  Diabetes screening:  Starting at age 35, Get screened for diabetes at least every 3 years.  If you are younger than age 35, ask your care team if you should be screened for diabetes.  Cholesterol test: At age 39, start having a cholesterol test every 5 years, or more often if advised.  Bone density scan (DEXA): At age 50, ask your care team if you should have this scan for osteoporosis (brittle bones).  Hepatitis C: Get tested at least once in your life.  STIs (sexually  transmitted infections)  Before age 24: Ask your care team if you should be screened for STIs.  After age 24: Get screened for STIs if you're at risk. You are at risk for STIs (including HIV) if:  You are sexually active with more than one person.  You don't use condoms every time.  You or a partner was diagnosed with a sexually transmitted infection.  If you are at risk for HIV, ask about PrEP medicine to prevent HIV.  Get tested for HIV at least once in your life, whether you are at risk for HIV or not.  Cancer screening tests  Cervical cancer screening: If you have a cervix, begin getting regular cervical cancer screening tests starting at age 21.  Breast cancer scan (mammogram): If you've ever had breasts, begin having regular mammograms starting at age 40. This is a scan to check for breast cancer.  Colon cancer screening: It is important to start screening for colon cancer at age 45.  Have a colonoscopy test every 10 years (or more often if you're at risk) Or, ask your provider about stool tests like a FIT test every year or Cologuard test every 3 years.  To learn more about your testing options, visit:   .  For help making a decision, visit:   https://bit.ly/yu82224.  Prostate cancer screening test: If you have a prostate, ask your care team if a prostate cancer screening test (PSA) at age 55 is right for you.  Lung cancer screening: If you are a current or former smoker ages 50 to 80, ask your care team if ongoing lung cancer screenings are right for you.  For informational purposes only. Not to replace the advice of your health care provider. Copyright   2023 [facility]. All rights reserved. Clinically reviewed by the [facility]. [place] [id number] - REV 01/24.  Substance Use Disorder: Care Instructions  Overview     You can improve your life and health by stopping your use of alcohol or drugs. When you don't drink or use drugs, you may feel and sleep better. You may  get along better with your family, friends, and coworkers. There are medicines and programs that can help with substance use disorder.  How can you care for yourself at home?  Here are some ways to help you stay sober and prevent relapse.  If you have been given medicine to help keep you sober or reduce your cravings, be sure to take it exactly as prescribed.  Talk to your doctor about programs that can help you stop using drugs or drinking alcohol.  Do not keep alcohol or drugs in your home.  Plan ahead. Think about what you'll say if other people ask you to drink or use drugs. Try not to spend time with people who drink or use drugs.  Use the time and money spent on drinking or drugs to do something that's important to you.  Preventing a relapse  Have a plan to deal with relapse. Learn to recognize changes in your thinking that lead you to drink or use drugs. Get help before you start to drink or use drugs again.  Try to stay away from situations, friends, or places that may lead you to drink or use drugs.  If you feel the need to drink alcohol or use drugs again, seek help right away. Call a trusted friend or family member. Some people get support from organizations such as Narcotics Anonymous or MADS or from treatment facilities.  If you relapse, get help as soon as you can. Some people make a plan with another person that outlines what they want that person to do for them if they relapse. The plan usually includes how to handle the relapse and who to notify in case of relapse.  Don't give up. Remember that a relapse doesn't mean that you have failed. Use the experience to learn the triggers that lead you to drink or use drugs. Then quit again. Recovery is a lifelong process. Many people have several relapses before they are able to quit for good.  Follow-up care is a key part of your treatment and safety. Be sure to make and go to all appointments, and call your doctor if you are having problems. It's  "also a good idea to know your test results and keep a list of the medicines you take.  When should you call for help?   Call 911  anytime you think you may need emergency care. For example, call if you or someone else:    Has overdosed or has withdrawal signs. Be sure to tell the emergency workers that you are or someone else is using or trying to quit using drugs. Overdose or withdrawal signs may include:  Losing consciousness.  Seizure.  Seeing or hearing things that aren't there (hallucinations).     Is thinking or talking about suicide or harming others.   Where to get help 24 hours a day, 7 days a week   If you or someone you know talks about suicide, self-harm, a mental health crisis, a substance use crisis, or any other kind of emotional distress, get help right away. You can:    Call the Suicide and Crisis Lifeline at 988.     Call 2-045-333-TALK (1-363.197.2614).     Text HOME to 852471 to access the Crisis Text Line.   Consider saving these numbers in your phone.  Go to [employer] for more information or to chat online.  Call your doctor now or seek immediate medical care if:    You are having withdrawal symptoms. These may include nausea or vomiting, sweating, shakiness, and anxiety.   Watch closely for changes in your health, and be sure to contact your doctor if:    You have a relapse.     You need more help or support to stop.   Where can you learn more?  Go to https://www.[employer].net/patiented  Enter H573 in the search box to learn more about \"Substance Use Disorder: Care Instructions.\"  Current as of: August 20, 2024  Content Version: 14.4    8122-7810 [employer].   Care instructions adapted under license by your healthcare professional. If you have questions about a medical condition or this instruction, always ask your healthcare professional. [employer] disclaims any warranty or liability for your use of this information.    Chronic Pain: Care Instructions  Your " Care Instructions     Chronic pain is pain that lasts a long time (months or even years) and may or may not have a clear cause. It is different from acute pain, which usually does have a clear cause--like an injury or illness--and gets better over time. Chronic pain:  Lasts over time but may vary from day to day.  Does not go away despite efforts to end it.  May disrupt your sleep and lead to fatigue.  May cause depression or anxiety.  May make your muscles tense, causing more pain.  Can disrupt your work, hobbies, home life, and relationships with friends and family.  Chronic pain is a very real condition. It is not just in your head. Treatment can help and usually includes several methods used together, such as medicines, physical therapy, exercise, and other treatments. Learning how to relax and changing negative thought patterns can also help you cope.  Chronic pain is complex. Taking an active role in your treatment will help you better manage your pain. Tell your doctor if you have trouble dealing with your pain. You may have to try several things before you find what works best for you.  Follow-up care is a key part of your treatment and safety. Be sure to make and go to all appointments, and call your doctor if you are having problems. It's also a good idea to know your test results and keep a list of the medicines you take.  How can you care for yourself at home?  Pace yourself. Break up large jobs into smaller tasks. Save harder tasks for days when you have less pain, or go back and forth between hard tasks and easier ones. Take rest breaks.  Relax, and reduce stress. Relaxation techniques such as deep breathing or meditation can help.  Keep moving. Gentle, daily exercise can help reduce pain over the long run. Try low- or no-impact exercises such as walking, swimming, and stationary biking. Do stretches to stay flexible.  Try heat, cold packs, and massage.  Get enough sleep. Chronic pain can make you  tired and drain your energy. Talk with your doctor if you have trouble sleeping because of pain.  Think positive. Your thoughts can affect your pain level. Do things that you enjoy to distract yourself when you have pain instead of focusing on the pain. See a movie, read a book, listen to music, or spend time with a friend.  If you think you are depressed, talk to your doctor about treatment.  Keep a daily pain diary. Record how your moods, thoughts, sleep patterns, activities, and medicine affect your pain. You may find that your pain is worse during or after certain activities or when you are feeling a certain emotion. Having a record of your pain can help you and your doctor find the best ways to treat your pain.  Take pain medicines exactly as directed.  If the doctor gave you a prescription medicine for pain, take it as prescribed.  If you are not taking a prescription pain medicine, ask your doctor if you can take an over-the-counter medicine.  Reducing constipation caused by pain medicine  Talk to your doctor about a laxative. If a laxative doesn't work, your doctor may suggest a prescription medicine.  Include fruits, vegetables, beans, and whole grains in your diet each day. These foods are high in fiber.  If your doctor recommends it, get more exercise. Walking is a good choice. Bit by bit, increase the amount you walk every day. Try for at least 30 minutes on most days of the week.  Schedule time each day for a bowel movement. A daily routine may help. Take your time and do not strain when having a bowel movement.  When should you call for help?   Call your doctor now or seek immediate medical care if:    Your pain gets worse or is out of control.     You feel down or blue, or you do not enjoy things like you once did. You may be depressed, which is common in people with chronic pain. Depression can be treated.     You have vomiting or cramps for more than 2 hours.   Watch closely for changes in your  "health, and be sure to contact your doctor if:    You cannot sleep because of pain.     You are very worried or anxious about your pain.     You have trouble taking your pain medicine.     You have any concerns about your pain medicine.     You have trouble with bowel movements, such as:  No bowel movement in 3 days.  Blood in the anal area, in your stool, or on the toilet paper.  Diarrhea for more than 24 hours.   Where can you learn more?  Go to https://www.[employer].net/patiented  Enter N004 in the search box to learn more about \"Chronic Pain: Care Instructions.\"  Current as of: July 31, 2024  Content Version: 14.4    7016-3285 [employer].   Care instructions adapted under license by your healthcare professional. If you have questions about a medical condition or this instruction, always ask your healthcare professional. [employer] disclaims any warranty or liability for your use of this information.       Lung Cancer Screening   Frequently Asked Questions  If you are at high-risk for lung cancer, getting screened with low-dose computed tomography (LDCT) every year can help save your life. This handout offers answers to some of the most common questions about lung cancer screening. If you have other questions, please call [phone] ([phone]).     What is it?  Lung cancer screening uses special X-ray technology to create an image of your lung tissue. The exam is quick and easy and takes less than 10 seconds. We don t give you any medicine or use any needles. You can eat before and after the exam. You don t need to change your clothes as long as the clothing on your chest doesn t contain metal. But, you do need to be able to hold your breath for at least 6 seconds during the exam.    What is the goal of lung cancer screening?  The goal of lung cancer screening is to save lives. Many times, lung cancer is not found until a person starts having physical symptoms. Lung cancer " screening can help detect lung cancer in the earliest stages when it may be easier to treat.    Who should be screened for lung cancer?  We suggest lung cancer screening for anyone who is at high-risk for lung cancer. You are in the high-risk group if you:      are between the ages of 55 and 79, and    have smoked at least 1 pack of cigarettes a day for 20 or more years, and    still smoke or have quit within the past 15 years.    However, if you have a new cough or shortness of breath, you should talk to your doctor before being screened.    Why does it matter if I have symptoms?  Certain symptoms can be a sign that you have a condition in your lungs that should be checked and treated by your doctor. These symptoms include fever, chest pain, a new or changing cough, shortness of breath that you have never felt before, coughing up blood or unexplained weight loss. Having any of these symptoms can greatly affect the results of lung cancer screening.       Should all smokers get an LDCT lung cancer screening exam?  It depends. Lung cancer screening is for a very specific group of men and women who have a history of heavy smoking over a long period of time (see  Who should be screened for lung cancer  above).  I am in the high-risk group, but have been diagnosed with cancer in the past. Is LDCT lung cancer screening right for me?  In some cases, you should not have LDCT lung screening, such as when your doctor is already following your cancer with CT scan studies. Your doctor will help you decide if LDCT lung screening is right for you.  Do I need to have a screening exam every year?  Yes. If you are in the high-risk group described earlier, you should get an LDCT lung cancer screening exam every year until you are 79, or are no longer willing or able to undergo screening and possible procedures to diagnose and treat lung cancer.  How effective is LDCT at preventing death from lung cancer?  Studies have shown that LDCT  lung cancer screening can lower the risk of death from lung cancer by 20 percent in people who are at high-risk.  What are the risks?  There are some risks and limitations of LDCT lung cancer screening. We want to make sure you understand the risks and benefits, so please let us know if you have any questions. Your doctor may want to talk with you more about these risks.    Radiation exposure: As with any exam that uses radiation, there is a very small increased risk of cancer. The amount of radiation in LDCT is small--about the same amount a person would get from a mammogram. Your doctor orders the exam when he or she feels the potential benefits outweigh the risks.    False negatives: No test is perfect, including LDCT. It is possible that you may have a medical condition, including lung cancer, that is not found during your exam. This is called a false negative result.    False positives and more testing: LDCT very often finds something in the lung that could be cancer, but in fact is not. This is called a false positive result. False positive tests often cause anxiety. To make sure these findings are not cancer, you may need to have more tests. These tests will be done only if you give us permission. Sometimes patients need a treatment that can have side effects, such as a biopsy. For more information on false positives, see  What can I expect from the results?     Findings not related to lung cancer: Your LDCT exam also takes pictures of areas of your body next to your lungs. In a very small number of cases, the CT scan will show an abnormal finding in one of these areas, such as your kidneys, adrenal glands, liver or thyroid. This finding may not be serious, but you may need more tests. Your doctor can help you decide what other tests you may need, if any.  What can I expect from the results?  About 1 out of 4 LDCT exams will find something that may need more tests. Most of the time, these findings are lung  nodules. Lung nodules are very small collections of tissue in the lung. These nodules are very common, and the vast majority--more than 97 percent--are not cancer (benign). Most are normal lymph nodes or small areas of scarring from past infections.  But, if a small lung nodule is found to be cancer, the cancer can be cured more than 90 percent of the time. To know if the nodule is cancer, we may need to get more images before your next yearly screening exam. If the nodule has suspicious features (for example, it is large, has an odd shape or grows over time), we will refer you to a specialist for further testing.  Will my doctor also get the results?  Yes. Your doctor will get a copy of your results.  Is it okay to keep smoking now that there s a cancer screening exam?  No. Tobacco is one of the strongest cancer-causing agents. It causes not only lung cancer, but other cancers and cardiovascular (heart) diseases as well. The damage caused by smoking builds over time. This means that the longer you smoke, the higher your risk of disease. While it is never too late to quit, the sooner you quit, the better.  Where can I find help to quit smoking?  The best way to prevent lung cancer is to stop smoking. If you have already quit smoking, congratulations and keep it up! For help on quitting smoking, please call [employer] at 7-180-QUITNOW (1-349.820.8585) or the American Cancer Society at 1-578.669.6130 to find local resources near you.  One-on-one health coaching:  If you d prefer to work individually with a health care provider on tobacco cessation, we offer:      Medication Therapy Management:  Our specially trained pharmacists work closely with you and your doctor to help you quit smoking.  Call [phone] or [phone] (toll free).

## 2025-05-07 ENCOUNTER — RESULTS FOLLOW-UP (OUTPATIENT)
Dept: FAMILY MEDICINE | Facility: CLINIC | Age: 70
End: 2025-05-07

## 2025-05-07 DIAGNOSIS — E78.5 HYPERLIPIDEMIA, UNSPECIFIED HYPERLIPIDEMIA TYPE: Primary | ICD-10-CM

## 2025-05-07 LAB — PSA SERPL DL<=0.01 NG/ML-MCNC: 0.88 NG/ML (ref 0–6.5)

## 2025-05-08 PROBLEM — Z85.048 HISTORY OF RECTAL CANCER: Status: ACTIVE | Noted: 2025-05-08

## 2025-05-08 RX ORDER — ATORVASTATIN CALCIUM 20 MG/1
20 TABLET, FILM COATED ORAL DAILY
Qty: 90 TABLET | Refills: 3 | Status: SHIPPED | OUTPATIENT
Start: 2025-05-08

## 2025-05-08 NOTE — RESULT ENCOUNTER NOTE
Your bad cholesterol LDL is really high.  You should be on cholesterol-lowering medication.  I have called in a prescription to your pharmacy.  Please take 1 pill every day.  Please also quit smoking completely as it puts you at high risk of developing heart attack and stroke.  Avoid excessive saturated fat in your diet.  Diabetes test, kidney function test, prostate cancer screening test are within normal limit.    Davion Sanon MD  [facility]

## 2025-06-11 ENCOUNTER — OFFICE VISIT (OUTPATIENT)
Dept: FAMILY MEDICINE | Facility: CLINIC | Age: 70
End: 2025-06-11
Payer: COMMERCIAL

## 2025-06-11 VITALS
DIASTOLIC BLOOD PRESSURE: 82 MMHG | TEMPERATURE: 98.3 F | HEIGHT: 71 IN | HEART RATE: 69 BPM | OXYGEN SATURATION: 100 % | BODY MASS INDEX: 28.14 KG/M2 | WEIGHT: 201 LBS | RESPIRATION RATE: 17 BRPM | SYSTOLIC BLOOD PRESSURE: 132 MMHG
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
## 2025-06-11 PROCEDURE — 3079F DIAST BP 80-89 MM HG: CPT

## 2025-06-11 PROCEDURE — 3075F SYST BP GE 130 - 139MM HG: CPT

## 2025-06-11 PROCEDURE — 99213 OFFICE O/P EST LOW 20 MIN: CPT

## 2025-06-11 RX ORDER — HYDROCORTISONE 25 MG/G
CREAM TOPICAL 2 TIMES DAILY
Qty: 30 G | Refills: 0 | Status: SHIPPED | OUTPATIENT
Start: 2025-06-11

## 2025-06-11 RX ORDER — CETIRIZINE HYDROCHLORIDE 10 MG/1
10 TABLET ORAL DAILY
Qty: 30 TABLET | Refills: 0 | Status: SHIPPED | OUTPATIENT
Start: 2025-06-11

## 2025-06-11 NOTE — PATIENT INSTRUCTIONS
I am prescribing a medication called hydrocortisone that you can apply to the area of your rash twice daily.  Once you have used this medication for 10 days consecutively, I would like you to give your skin at least 1 week of a break without the medication.  You can repeat this cycle until the rash has resolved.  Extended use of topical steroids can lead to unintentional skin breakdown.    You can wash your body with warm water and gentle soap, but avoid picking, touching, and scratching the area or using new or irritative products on the skin.    You can also take an oral antihistamine such as Zyrtec, Claritin, or Allegra.  This can help to manage some of the itching.  I always recommend to patient's to avoid itching the area is much as possible, as this can lead to spreading of the rash, or further irritation of the rash    Please follow-up in about 1 week if symptoms do not seem to be improving with this plan of care    Seek immediate medical attention with symptoms including rapidly spreading rash, streaking or spreading redness surrounding the rash, significant swelling of the rash, colorful discharge from the rash, fever, chills, body aches, or vomiting.

## 2025-06-11 NOTE — PROGRESS NOTES
Assessment & Plan     (W57.XXXA) Insect bite, unspecified site, initial encounter  (primary encounter diagnosis)  Comment: Acute and stable.  No concerns for infectious etiology today.  Physical exam findings reveal what is largely supported for insect bite irritation.  Adding on topical hydrocortisone to manage itching and inflammation and oral Zyrtec as well.  Would like patient to follow-up in the next 1 to 2 weeks if symptoms are not improving to discuss next steps. Offered education on medications including appropriate dosing, possible side effects, and possible adverse effects.  Education given on return to clinic instructions as well as alarm signs that would require the need for immediate medical attention.  Patient attested to understanding.  Plan: hydrocortisone 2.5 % cream, cetirizine (ZYRTEC)        10 MG tablet      This progress note has been dictated, with use of voice recognition software. Any grammatical, typographical, or context errors are unintentional and inherent to use of voice recognition software.     Prescription drug management  I spent a total of 10 minutes on the day of the visit.   Time spent by me today doing chart review, history and exam, documentation and further activities per the note        Follow-up   Follow-up in 1 week with PCP if symptoms are not improving        Subjective   Paul is a 70 year old, presenting for the following health issues:  Insect Bites (Unknown bug bites on their back when patient traveled to [place] 3 weeks ago. Welts on skin and itchy. Patient states symptoms staying the same)        6/11/2025    12:34 PM   Additional Questions   Roomed by Silvia   Accompanied by Alone     History of Present Illness       Reason for visit:  Bug bites  Symptom onset:  1-2 weeks ago  Symptoms include:  Itching severe  Symptom intensity:  Severe  Symptom progression:  Staying the same  Had these symptoms before:  No  What makes it worse:  No  What makes it better:  Benedryl  "  He is taking medications regularly.      Paul is a 70-year-old male with a past medical history significant for hyperlipidemia, erectile dysfunction, hypertension, and a history of rectal cancer who presents today with concerns for bug bites.  Patient reports that he was in [place] over Memorial Day weekend staying with some family, but the time he returned home he had noticed a significant amount of bug bites on his posterior and anterior trunk.  He has tried out Benadryl daily since he noticed these with intermittent success, but reports that the bite seem to be taking quite some time to heal.  He reports that they are very itchy, and he has itched them so much that some of them have scabbed over.  He otherwise declines any swelling, streaking erythema, drainage, or bleeding from the area.  He has no concerns for fever, chills, body aches, chest pain, shortness of breath, lightheadedness or dizziness, blurry or double vision, or GI upset.      Review of Systems  Constitutional, HEENT, cardiovascular, pulmonary, gi and gu systems are negative, except as otherwise noted.      Objective    /82   Pulse 69   Temp 98.3  F (36.8  C) (Oral)   Resp 17   Ht 1.803 m (5' 11\")   Wt 91.2 kg (201 lb)   SpO2 100%   BMI 28.03 kg/m    Body mass index is 28.03 kg/m .  Physical Exam   GENERAL: alert and no distress  EYES: Eyes grossly normal to inspection, PERRL and conjunctivae and sclerae normal  HENT: ear canals and TM's normal, nose and mouth without ulcers or lesions  NECK: no adenopathy, no asymmetry, masses, or scars  RESP: lungs clear to auscultation - no rales, rhonchi or wheezes  CV: regular rate and rhythm, normal S1 S2, no S3 or S4, no murmur, click or rub, no peripheral edema  ABDOMEN: soft, nontender, no hepatosplenomegaly, no masses and bowel sounds normal  MS: no gross musculoskeletal defects noted, no edema  SKIN: Erythematous papules scattered as satellite lesions across posterior and anterior " trunk    Dari Juarez DNP FNP-C  Family Nurse Practitioner - Same Day Provider  [facility]        Signed Electronically by: JORGE A Dangelo CNP

## 2025-06-17 ENCOUNTER — OFFICE VISIT (OUTPATIENT)
Dept: ORTHOPEDICS | Facility: CLINIC | Age: 70
End: 2025-06-17
Payer: COMMERCIAL

## 2025-06-17 VITALS — HEIGHT: 71 IN | WEIGHT: 201 LBS | BODY MASS INDEX: 28.14 KG/M2

## 2025-06-17 DIAGNOSIS — M20.21 ACQUIRED HALLUX RIGIDUS, RIGHT: Primary | ICD-10-CM

## 2025-06-17 PROCEDURE — 20604 DRAIN/INJ JOINT/BURSA W/US: CPT | Mod: RT | Performed by: FAMILY MEDICINE

## 2025-06-17 PROCEDURE — 99207 PR DROP WITH A PROCEDURE: CPT | Performed by: FAMILY MEDICINE

## 2025-06-17 RX ORDER — LIDOCAINE HYDROCHLORIDE 10 MG/ML
0.5 INJECTION, SOLUTION EPIDURAL; INFILTRATION; INTRACAUDAL; PERINEURAL
Status: COMPLETED | OUTPATIENT
Start: 2025-06-17 | End: 2025-06-17

## 2025-06-17 RX ORDER — TRIAMCINOLONE ACETONIDE 40 MG/ML
20 INJECTION, SUSPENSION INTRA-ARTICULAR; INTRAMUSCULAR
Status: COMPLETED | OUTPATIENT
Start: 2025-06-17 | End: 2025-06-17

## 2025-06-17 RX ADMIN — LIDOCAINE HYDROCHLORIDE 0.5 ML: 10 INJECTION, SOLUTION EPIDURAL; INFILTRATION; INTRACAUDAL; PERINEURAL at 07:59

## 2025-06-17 RX ADMIN — TRIAMCINOLONE ACETONIDE 20 MG: 40 INJECTION, SUSPENSION INTRA-ARTICULAR; INTRAMUSCULAR at 07:59

## 2025-06-17 SDOH — HEALTH STABILITY: PHYSICAL HEALTH: ON AVERAGE, HOW MANY DAYS PER WEEK DO YOU ENGAGE IN MODERATE TO STRENUOUS EXERCISE (LIKE A BRISK WALK)?: 2 DAYS

## 2025-06-17 SDOH — HEALTH STABILITY: PHYSICAL HEALTH: ON AVERAGE, HOW MANY MINUTES DO YOU ENGAGE IN EXERCISE AT THIS LEVEL?: 30 MIN

## 2025-06-17 NOTE — LETTER
2025      RE: Paul Roland  [address]  [place] [address]     Dear Colleague,    Thank you for referring your patient, Paul Roland, to the [facility]. Please see a copy of my visit note below.    PROCEDURE ENCOUNTER    [facility]  Sterling Dia DO  2025     Name: Paul Roland  MRN: [id number]  Age: 70 year old  : 1955    Referring provider: Self  Diagnosis: (M20.21) Acquired hallux rigidus, right  (primary encounter diagnosis)    First Metatarsophalangeal joint Injection- Ultrasound Guided    The patient was informed of the risks and the benefits of the procedure and a written consent was signed.    The patient s right great toe was prepped with chlorhexidine in sterile fashion.     20 mg of methylprednisolone suspension was drawn up into a 3 mL syringe with 0.5 mL of 1% lidocaine.    Injection was performed using sterile technique.  Under ultrasound guidance a 1.5-inch 25-gauge needle was used to enter the 1st MTP joint of the right great toe.  Needle placement was visualized and documented with ultrasound.  Needle placed in short axis to the probe.  Ultrasound visualization was necessary due to decreased joint space in the setting of osteoarthritis.  Images were permanently stored for the patient's record. There were no complications. The patient tolerated the procedure well. There was negligible bleeding.    The patient was instructed to ice the toe upon leaving clinic and refrain from overuse over the next 3 days.     The patient was instructed to call or go to the emergency room with any unusual pain, swelling, redness, or if otherwise concerned.    Sterling Dia DO CAM  Primary Care Sports Medicine  [facility]          Small Joint Injection: R great MTP    Date/Time: 2025 7:59 AM    Performed by: Sterling Dia DO  Authorized by: Sterling Dia  DO  Indications:  Pain  Needle Size:  25 G  Guidance: ultrasound     Approach:  Dorsal  Location:  Great toe    Site:  R great MTP                    Medications:  20 mg triamcinolone 40 MG/ML; 0.5 mL lidocaine (PF) 1 %        Outcome:  Tolerated well, no immediate complications  Procedure discussed: discussed risks, benefits, and alternatives    Consent Given by:  Patient  Timeout: timeout called immediately prior to procedure    Prep: patient was prepped and draped in usual sterile fashion            Again, thank you for allowing me to participate in the care of your patient.      Sincerely,    Sterling Dia DO

## 2025-06-17 NOTE — NURSING NOTE
[address] [address]  Dept: [phone]  ______________________________________________________________________________    Patient: Paul Roland   : 1955   MRN: [id number]   2025    INVASIVE PROCEDURE SAFETY CHECKLIST    Date: 25   Procedure: Right 1st MTP USG kenalog injection  Patient Name: Paul Roland  MRN: [id number]  YOB: 1955    Action: Complete sections as appropriate. Any discrepancy results in a HARD COPY until resolved.     PRE PROCEDURE:  Patient ID verified with 2 identifiers (name and  or MRN): Yes  Procedure and site verified with patient/designee (when able): Yes  Accurate consent documentation in medical record: Yes  H&P (or appropriate assessment) documented in medical record: Yes  H&P must be up to 20 days prior to procedure and updates within 24 hours of procedure as applicable: NA  Relevant diagnostic and radiology test results appropriately labeled and displayed as applicable: Yes  Procedure site(s) marked with provider initials: NA    TIMEOUT:  Time-Out performed immediately prior to starting procedure, including verbal and active participation of all team members addressing the following:Yes  * Correct patient identify  * Confirmed that the correct side and site are marked  * An accurate procedure consent form  * Agreement on the procedure to be done  * Correct patient position  * Relevant images and results are properly labeled and appropriately displayed  * The need to administer antibiotics or fluids for irrigation purposes during the procedure as applicable   * Safety precautions based on patient history or medication use    DURING PROCEDURE: Verification of correct person, site, and procedures any time the responsibility for care of the patient is transferred to another member of the care team.       Prior to injection, verified patient identity using patient's name and date of  birth.  Due to injection administration, patient instructed to remain in clinic for 15 minutes  afterwards, and to report any adverse reaction to me immediately.    Joint injection was performed.      Drug Amount Wasted:  Yes: 5 mg/ml  0.5mL kenalog; 4.5mL lidocaine  Vial/Syringe: Single dose vial  Expiration Date:  11/2026;01/2029      Aileen Nieto, [facility]  June 17, 2025

## 2025-06-17 NOTE — PROGRESS NOTES
PROCEDURE ENCOUNTER    [facility]  Sports Medicine [facility]  Sterling Dia DO  2025     Name: Paul Roland  MRN: [id number]  Age: 70 year old  : 1955    Referring provider: Self  Diagnosis: (M20.21) Acquired hallux rigidus, right  (primary encounter diagnosis)    First Metatarsophalangeal joint Injection- Ultrasound Guided    The patient was informed of the risks and the benefits of the procedure and a written consent was signed.    The patient s right great toe was prepped with chlorhexidine in sterile fashion.     20 mg of methylprednisolone suspension was drawn up into a 3 mL syringe with 0.5 mL of 1% lidocaine.    Injection was performed using sterile technique.  Under ultrasound guidance a 1.5-inch 25-gauge needle was used to enter the 1st MTP joint of the right great toe.  Needle placement was visualized and documented with ultrasound.  Needle placed in short axis to the probe.  Ultrasound visualization was necessary due to decreased joint space in the setting of osteoarthritis.  Images were permanently stored for the patient's record. There were no complications. The patient tolerated the procedure well. There was negligible bleeding.    The patient was instructed to ice the toe upon leaving clinic and refrain from overuse over the next 3 days.     The patient was instructed to call or go to the emergency room with any unusual pain, swelling, redness, or if otherwise concerned.    Sterling Dia DO [facility]  Primary Care Sports Medicine  [facility]          Small Joint Injection: R great MTP    Date/Time: 2025 7:59 AM    Performed by: Sterling Dia DO  Authorized by: Sterling Dia DO  Indications:  Pain  Needle Size:  25 G  Guidance: ultrasound     Approach:  Dorsal  Location:  Great toe    Site:  R great MTP                    Medications:  20 mg triamcinolone 40 MG/ML; 0.5 mL lidocaine (PF) 1 %        Outcome:  Tolerated well, no immediate  complications  Procedure discussed: discussed risks, benefits, and alternatives    Consent Given by:  Patient  Timeout: timeout called immediately prior to procedure    Prep: patient was prepped and draped in usual sterile fashion

## 2025-06-24 DIAGNOSIS — M20.21 ACQUIRED HALLUX RIGIDUS, RIGHT: ICD-10-CM

## 2025-06-26 RX ORDER — DICLOFENAC SODIUM 75 MG/1
75 TABLET, DELAYED RELEASE ORAL DAILY
Qty: 30 TABLET | Refills: 2 | Status: SHIPPED | OUTPATIENT
Start: 2025-06-26

## 2025-06-26 NOTE — TELEPHONE ENCOUNTER
Last Written Prescription:     diclofenac (VOLTAREN) 75 MG EC tablet 30 tablet 2 3/31/2025 -- No   Sig - Route: TAKE 1 TABLET BY MOUTH EVERY DAY - Oral     ----------------------  Last Visit Date: 6/17/25 [place]  Future Visit Date: None  ----------------------      Refill decision:   [] Medication refilled per  Medication Refill in Ambulatory Care  policy.  [x] Medication unable to be refilled by RN due to: Overdue labs/test:   and Other: age > 64, last CBC done 10/18/23        Request from pharmacy:  Requested Prescriptions   Pending Prescriptions Disp Refills    diclofenac (VOLTAREN) 75 MG EC tablet [Pharmacy Med Name: DICLOFENAC SOD EC 75 MG TAB] 30 tablet 2     Sig: TAKE 1 TABLET BY MOUTH EVERY DAY       NSAID Medications Failed - 6/26/2025  2:19 PM        Failed - Patient is age 6-64 years        Failed - Normal CBC on file in past 12 months     Recent Labs   Lab Test 10/18/23  1636   WBC 3.9*   RBC 4.99   HGB 15.1   HCT 45.6                    Failed - Always Fail Criteria - Chart Review Required     Validate Diagnosis. If the medication is requested for an acute flare of a chronic pain associated with a musculoskeletal or rheumatologic condition; okay to authorize if all other criteria are met. If not, then forward to provider for review.          Passed - Most recent blood pressure under 140/90 in past 12 months     BP Readings from Last 3 Encounters:   06/11/25 132/82   05/06/25 122/80   06/21/24 127/85       No data recorded            Passed - Medication is active on med list and the sig matches. RN to manually verify dose and sig if red X/fail.     If the protocol passes (green check), you do not need to verify med dose and sig.    A prescription matches if they are the same clinical intention.    For Example: once daily and every morning are the same.    The protocol can not identify upper and lower case letters as matching and will fail.     For Example: Take 1 tablet (50 mg) by mouth daily      TAKE 1 TABLET (50 MG) BY MOUTH DAILY    For all fails (red x), verify dose and sig.    If the refill does match what is on file, the RN can still proceed to approve the refill request.       If they do not match, route to the appropriate provider.             Passed - Normal GFR on file in past 12 months     Recent Labs   Lab Test 05/06/25  1453 07/20/21  1502 06/17/20  1442   GFRESTIMATED 77   < > 78   GFRESTBLACK  --   --  >90    < > = values in this interval not displayed.             Passed - Recent (12 month) or future (90 days) visit with authorizing provider's specialty (provided they have been seen in the past 15 months)     The patient must have completed an in-person or virtual visit within the past 12 months or has a future visit scheduled within the next 90 days with the authorizing provider s specialty.  Urgent care and e-visits do not qualify as an office visit for this protocol.
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
## 2025-06-30 RX ORDER — HYDROCORTISONE 25 MG/G
CREAM TOPICAL 2 TIMES DAILY
Qty: 28.35 G | Refills: 0 | Status: SHIPPED | OUTPATIENT
Start: 2025-06-30
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
## 2025-07-08 RX ORDER — CETIRIZINE HYDROCHLORIDE 10 MG/1
10 TABLET ORAL DAILY
Qty: 90 TABLET | Refills: 1 | Status: SHIPPED | OUTPATIENT
Start: 2025-07-08

## (undated) RX ORDER — LIDOCAINE HYDROCHLORIDE 10 MG/ML
INJECTION, SOLUTION EPIDURAL; INFILTRATION; INTRACAUDAL; PERINEURAL
Status: DISPENSED
Start: 2025-06-17

## (undated) RX ORDER — LIDOCAINE HYDROCHLORIDE 10 MG/ML
INJECTION, SOLUTION EPIDURAL; INFILTRATION; INTRACAUDAL; PERINEURAL
Status: DISPENSED
Start: 2024-03-15

## (undated) RX ORDER — TRIAMCINOLONE ACETONIDE 40 MG/ML
INJECTION, SUSPENSION INTRA-ARTICULAR; INTRAMUSCULAR
Status: DISPENSED
Start: 2022-09-16

## (undated) RX ORDER — TRIAMCINOLONE ACETONIDE 40 MG/ML
INJECTION, SUSPENSION INTRA-ARTICULAR; INTRAMUSCULAR
Status: DISPENSED
Start: 2025-06-17

## (undated) RX ORDER — LIDOCAINE HYDROCHLORIDE 10 MG/ML
INJECTION, SOLUTION EPIDURAL; INFILTRATION; INTRACAUDAL; PERINEURAL
Status: DISPENSED
Start: 2024-11-19

## (undated) RX ORDER — LIDOCAINE HYDROCHLORIDE 10 MG/ML
INJECTION, SOLUTION EPIDURAL; INFILTRATION; INTRACAUDAL; PERINEURAL
Status: DISPENSED
Start: 2022-05-31

## (undated) RX ORDER — TRIAMCINOLONE ACETONIDE 40 MG/ML
INJECTION, SUSPENSION INTRA-ARTICULAR; INTRAMUSCULAR
Status: DISPENSED
Start: 2024-03-15

## (undated) RX ORDER — TRIAMCINOLONE ACETONIDE 40 MG/ML
INJECTION, SUSPENSION INTRA-ARTICULAR; INTRAMUSCULAR
Status: DISPENSED
Start: 2023-01-06

## (undated) RX ORDER — TRIAMCINOLONE ACETONIDE 40 MG/ML
INJECTION, SUSPENSION INTRA-ARTICULAR; INTRAMUSCULAR
Status: DISPENSED
Start: 2023-05-12

## (undated) RX ORDER — TRIAMCINOLONE ACETONIDE 40 MG/ML
INJECTION, SUSPENSION INTRA-ARTICULAR; INTRAMUSCULAR
Status: DISPENSED
Start: 2022-05-31

## (undated) RX ORDER — TRIAMCINOLONE ACETONIDE 40 MG/ML
INJECTION, SUSPENSION INTRA-ARTICULAR; INTRAMUSCULAR
Status: DISPENSED
Start: 2024-11-19

## (undated) RX ORDER — TRIAMCINOLONE ACETONIDE 40 MG/ML
INJECTION, SUSPENSION INTRA-ARTICULAR; INTRAMUSCULAR
Status: DISPENSED
Start: 2021-10-15

## (undated) RX ORDER — LIDOCAINE HYDROCHLORIDE 10 MG/ML
INJECTION, SOLUTION EPIDURAL; INFILTRATION; INTRACAUDAL; PERINEURAL
Status: DISPENSED
Start: 2024-07-26

## (undated) RX ORDER — TRIAMCINOLONE ACETONIDE 40 MG/ML
INJECTION, SUSPENSION INTRA-ARTICULAR; INTRAMUSCULAR
Status: DISPENSED
Start: 2022-01-25

## (undated) RX ORDER — LIDOCAINE HYDROCHLORIDE 10 MG/ML
INJECTION, SOLUTION EPIDURAL; INFILTRATION; INTRACAUDAL; PERINEURAL
Status: DISPENSED
Start: 2021-07-14

## (undated) RX ORDER — LIDOCAINE HYDROCHLORIDE 10 MG/ML
INJECTION, SOLUTION EPIDURAL; INFILTRATION; INTRACAUDAL; PERINEURAL
Status: DISPENSED
Start: 2023-05-12

## (undated) RX ORDER — LIDOCAINE HYDROCHLORIDE 10 MG/ML
INJECTION, SOLUTION EPIDURAL; INFILTRATION; INTRACAUDAL; PERINEURAL
Status: DISPENSED
Start: 2023-12-05

## (undated) RX ORDER — LIDOCAINE HYDROCHLORIDE 10 MG/ML
INJECTION, SOLUTION EPIDURAL; INFILTRATION; INTRACAUDAL; PERINEURAL
Status: DISPENSED
Start: 2023-01-06

## (undated) RX ORDER — TRIAMCINOLONE ACETONIDE 40 MG/ML
INJECTION, SUSPENSION INTRA-ARTICULAR; INTRAMUSCULAR
Status: DISPENSED
Start: 2024-07-26

## (undated) RX ORDER — LIDOCAINE HYDROCHLORIDE 10 MG/ML
INJECTION, SOLUTION EPIDURAL; INFILTRATION; INTRACAUDAL; PERINEURAL
Status: DISPENSED
Start: 2022-09-16

## (undated) RX ORDER — TRIAMCINOLONE ACETONIDE 40 MG/ML
INJECTION, SUSPENSION INTRA-ARTICULAR; INTRAMUSCULAR
Status: DISPENSED
Start: 2023-12-05

## (undated) RX ORDER — LIDOCAINE HYDROCHLORIDE 10 MG/ML
INJECTION, SOLUTION EPIDURAL; INFILTRATION; INTRACAUDAL; PERINEURAL
Status: DISPENSED
Start: 2021-10-15

## (undated) RX ORDER — TRIAMCINOLONE ACETONIDE 40 MG/ML
INJECTION, SUSPENSION INTRA-ARTICULAR; INTRAMUSCULAR
Status: DISPENSED
Start: 2021-07-14

## (undated) RX ORDER — LIDOCAINE HYDROCHLORIDE 10 MG/ML
INJECTION, SOLUTION EPIDURAL; INFILTRATION; INTRACAUDAL; PERINEURAL
Status: DISPENSED
Start: 2022-01-25